# Patient Record
Sex: MALE | Race: ASIAN | NOT HISPANIC OR LATINO | Employment: OTHER | URBAN - METROPOLITAN AREA
[De-identification: names, ages, dates, MRNs, and addresses within clinical notes are randomized per-mention and may not be internally consistent; named-entity substitution may affect disease eponyms.]

---

## 2017-02-01 ENCOUNTER — OFFICE VISIT (OUTPATIENT)
Dept: INTERNAL MEDICINE | Facility: CLINIC | Age: 67
End: 2017-02-01
Payer: MEDICARE

## 2017-02-01 VITALS
BODY MASS INDEX: 28.77 KG/M2 | HEIGHT: 69 IN | HEART RATE: 66 BPM | DIASTOLIC BLOOD PRESSURE: 84 MMHG | SYSTOLIC BLOOD PRESSURE: 122 MMHG | WEIGHT: 194.25 LBS

## 2017-02-01 DIAGNOSIS — N13.8 BPH (BENIGN PROSTATIC HYPERTROPHY) WITH URINARY OBSTRUCTION: ICD-10-CM

## 2017-02-01 DIAGNOSIS — I10 ESSENTIAL HYPERTENSION: ICD-10-CM

## 2017-02-01 DIAGNOSIS — Z00.00 ANNUAL PHYSICAL EXAM: Primary | ICD-10-CM

## 2017-02-01 DIAGNOSIS — Z85.09 HISTORY OF CHOLANGIOCARCINOMA: ICD-10-CM

## 2017-02-01 DIAGNOSIS — R73.03 PREDIABETES: ICD-10-CM

## 2017-02-01 DIAGNOSIS — Z86.010 HX OF COLONIC POLYPS: ICD-10-CM

## 2017-02-01 DIAGNOSIS — N40.1 BPH (BENIGN PROSTATIC HYPERTROPHY) WITH URINARY OBSTRUCTION: ICD-10-CM

## 2017-02-01 DIAGNOSIS — E78.5 HYPERLIPIDEMIA, UNSPECIFIED HYPERLIPIDEMIA TYPE: ICD-10-CM

## 2017-02-01 LAB
BILIRUB UR QL STRIP: NEGATIVE
CLARITY UR REFRACT.AUTO: ABNORMAL
COLOR UR AUTO: YELLOW
CREAT UR-MCNC: 53 MG/DL
GLUCOSE UR QL STRIP: NEGATIVE
HGB UR QL STRIP: NEGATIVE
KETONES UR QL STRIP: NEGATIVE
LEUKOCYTE ESTERASE UR QL STRIP: NEGATIVE
MICROALBUMIN UR DL<=1MG/L-MCNC: 5 UG/ML
MICROALBUMIN/CREATININE RATIO: 9.4 UG/MG
NITRITE UR QL STRIP: NEGATIVE
PH UR STRIP: 8 [PH] (ref 5–8)
PROT UR QL STRIP: NEGATIVE
SP GR UR STRIP: 1.01 (ref 1–1.03)
URN SPEC COLLECT METH UR: ABNORMAL
UROBILINOGEN UR STRIP-ACNC: NEGATIVE EU/DL

## 2017-02-01 PROCEDURE — 99397 PER PM REEVAL EST PAT 65+ YR: CPT | Mod: S$GLB,,, | Performed by: INTERNAL MEDICINE

## 2017-02-01 PROCEDURE — 82570 ASSAY OF URINE CREATININE: CPT

## 2017-02-01 PROCEDURE — 81003 URINALYSIS AUTO W/O SCOPE: CPT

## 2017-02-01 PROCEDURE — 99999 PR PBB SHADOW E&M-EST. PATIENT-LVL III: CPT | Mod: PBBFAC,,, | Performed by: INTERNAL MEDICINE

## 2017-02-01 PROCEDURE — 82270 OCCULT BLOOD FECES: CPT | Mod: S$GLB,,, | Performed by: INTERNAL MEDICINE

## 2017-02-01 RX ORDER — OMEPRAZOLE 20 MG/1
20 CAPSULE, DELAYED RELEASE ORAL DAILY
Qty: 30 CAPSULE | Refills: 11 | Status: SHIPPED | OUTPATIENT
Start: 2017-02-01 | End: 2017-03-06 | Stop reason: SDUPTHER

## 2017-02-01 RX ORDER — TAMSULOSIN HYDROCHLORIDE 0.4 MG/1
0.4 CAPSULE ORAL DAILY
Qty: 30 CAPSULE | Refills: 11 | Status: SHIPPED | OUTPATIENT
Start: 2017-02-01 | End: 2017-03-06 | Stop reason: SDUPTHER

## 2017-02-01 NOTE — MR AVS SNAPSHOT
[facility]  [address]  [place] [address]  Phone: [phone]  Fax: [phone]                  Eric Brown   2017 11:00 AM   Office Visit    Description:  Male : 1950   Provider:  Pacheco Beyer MD   Department:  [facility]           Reason for Visit     Annual Exam           Diagnoses this Visit        Comments    Annual physical exam    -  Primary     Essential hypertension         BPH (benign prostatic hypertrophy) with urinary obstruction         Hyperlipidemia, unspecified hyperlipidemia type         Prediabetes         Hx of colonic polyps         History of cholangiocarcinoma                To Do List           Future Appointments        Provider Department Dept Phone    2017 3:30 PM Amalia Canales MD [place] - Hematology Oncology [phone]      Goals (5 Years of Data)     Reduce portion size       Follow-Up and Disposition     Return in about 4 weeks (around 3/1/2017).       These Medications        Disp Refills Start End    tamsulosin (FLOMAX) 0.4 mg Cp24 30 capsule 11 2017     Take 1 capsule (0.4 mg total) by mouth once daily. - Oral    Pharmacy: [facility]/pharmacy #[id number] - [place] - [address] Ph #: [phone]       omeprazole (PRILOSEC) 20 MG capsule 30 capsule 11 2017 3/3/2017    Take 1 capsule (20 mg total) by mouth once daily. - Oral    Pharmacy: [facility]/pharmacy #[id number] - [place] - [address] Ph #: [phone]         [facility]     [facility] On Call Nurse Care Line -  Assistance  Registered nurses in the [facility] On Call Center provide clinical advisement, health education, appointment booking, and other advisory services.  Call for this free service at [phone].             Medications           Message regarding Medications     Verify the changes and/or additions to your medication regime listed below are the same as discussed with your clinician today.  If any of these  changes or additions are incorrect, please notify your healthcare provider.        START taking these NEW medications        Refills    tamsulosin (FLOMAX) 0.4 mg Cp24 11    Sig: Take 1 capsule (0.4 mg total) by mouth once daily.    Class: Normal    Route: Oral    omeprazole (PRILOSEC) 20 MG capsule 11    Sig: Take 1 capsule (20 mg total) by mouth once daily.    Class: Normal    Route: Oral      STOP taking these medications     doxazosin (CARDURA) 4 MG tablet Take 1 tablet (4 mg total) by mouth every evening.           Verify that the below list of medications is an accurate representation of the medications you are currently taking.  If none reported, the list may be blank. If incorrect, please contact your healthcare provider. Carry this list with you in case of emergency.           Current Medications     allopurinol (ZYLOPRIM) 100 MG tablet Take 1 tablet (100 mg total) by mouth once daily.    aspirin 81 MG Chew Take 81 mg by mouth once daily.    atorvastatin (LIPITOR) 10 MG tablet Take 1 tablet (10 mg total) by mouth once daily.    CALCIUM CARBONATE/VITAMIN D3 (VITAMIN D-3 ORAL) Take 1 tablet by mouth once daily.    felodipine (PLENDIL) 10 MG 24 hr tablet Take 1 tablet (10 mg total) by mouth once daily.    losartan (COZAAR) 50 MG tablet Take 1 tablet (50 mg total) by mouth once daily.    meloxicam (MOBIC) 15 MG tablet Take 1 tablet (15 mg total) by mouth once daily.    MULTIVIT-IRON-MIN-FOLIC ACID 3,500-18-0.4 UNIT-MG-MG ORAL CHEW Take by mouth.    diclofenac sodium (VOLTAREN) 1 % Gel Apply 2 g topically 4 (four) times daily.    omeprazole (PRILOSEC) 20 MG capsule Take 1 capsule (20 mg total) by mouth once daily.    tamsulosin (FLOMAX) 0.4 mg Cp24 Take 1 capsule (0.4 mg total) by mouth once daily.           Clinical Reference Information           Vital Signs - Last Recorded  Most recent update: 2/1/2017 11:15 AM by Kristen Flores MA    BP Pulse Ht Wt BMI    122/84 (BP Location: Right arm, Patient Position:  "Sitting, BP Method: Manual) 66 5' 9" (1.753 m) 88.1 kg (194 lb 3.6 oz) 28.68 kg/m2      Blood Pressure          Most Recent Value    BP  122/84      Allergies as of 2/1/2017     Iodine And Iodide Containing Products    Iohexol      Immunizations Administered on Date of Encounter - 2/1/2017     None      Orders Placed During Today's Visit      Normal Orders This Visit    Microalbumin/creatinine urine ratio     POCT occult blood stool     Urinalysis     Future Labs/Procedures Expected by Expires    CBC auto differential  2/1/2017 2/1/2018    Hemoglobin A1c  2/1/2017 2/1/2018    Lipid panel  2/1/2017 2/1/2018    PSA, Screening  2/1/2017 2/1/2018    Comprehensive metabolic panel  8/4/2017 (Approximate) 2/1/2018      "

## 2017-02-01 NOTE — PROGRESS NOTES
CHIEF COMPLAINT:  Physical exam.    HISTORY OF PRESENT ILLNESS:  The patient is a 66-year-old gentleman with a   history of BPH, AFib, cholangiocarcinoma, colon polyps, hypertension,   hyperlipidemia who comes in today for annual physical exam.  The patient did go   to [facility] in November, things are going well.    REVIEW OF SYSTEMS:  The patient reports some decreased clarity of his vision.    No auditory change, no dysphasia, no chest pain, no shortness of breath, no   nausea, vomiting, no abdominal pain, no bowel changes.  He does bring up that he   has been having episodes of burning in the back of the throat.  His son who is   a physician here at [facility] recommended proton pump inhibitor, which he did take   for one week.  He is feeling much better.  He is inquiring whether he can have   a prescription.  He has no trouble with urination, but the flow is not quite as   strong as it used to be.  Sometimes he gets up once at night to urinate.  No   weakness in the arms or legs.  No skin changes.  No numbness or tingling in the   arms or legs.  He had a colonoscopy in 2015.  His last cholesterol was in   November 2015.    PHYSICAL EXAMINATION:  GENERAL APPEARANCE:  No acute distress.  HEENT:  Conjunctivae clear.  He does have bilateral cataracts.  TMs are clear.    Nasal septum is midline without discharge.  Oropharynx is without erythema.  NECK:  Trachea is midline without JVD, without thyromegaly.  PULMONARY:  Good inspiratory, expiratory breath sounds are heard.  Lungs are   clear to auscultation.  CARDIOVASCULAR:  S1, S2.  2+ carotid pulses without bruits.  EXTREMITIES:  Without edema.  ABDOMEN:  Nontender, nondistended, without hepatosplenomegaly.  RECTAL:  A digital rectal exam was performed.  The rectum was normal.  Stool was   brown and heme negative.  GENITOURINARY:  Scrotum and penis were normal.  Prostate without masses or   nodules.  LYMPHATICS:  No cervical, axillary or inguinal  adenopathy.    ASSESSMENT:  1.  Annual physical exam.  2.  Hypertension.  3.  BPH.  4.  Hyperlipidemia.  5.  Prediabetes.  6.  History of colon polyps.  7.  History of cholangiocarcinoma.    PLAN:  We will put the patient in for a CBC, CMP, A1c, lipid panel, urine for   microalbumin, stools x3, PSA and UA.  We will also start the patient on Flomax   0.4 mg once a day.  We will also stop the Cardura that he is on.  He is to   follow up in one month for reevaluation.      PREM/IN  dd: 02/01/2017 15:14:33 (CST)  td: 02/02/2017 06:07:28 (CST)  Doc ID   #[id number]  Job ID #[id number]    CC:

## 2017-02-02 ENCOUNTER — LAB VISIT (OUTPATIENT)
Dept: LAB | Facility: HOSPITAL | Age: 67
End: 2017-02-02
Attending: INTERNAL MEDICINE
Payer: MEDICARE

## 2017-02-02 DIAGNOSIS — Z85.09 HISTORY OF CHOLANGIOCARCINOMA: ICD-10-CM

## 2017-02-02 DIAGNOSIS — Z00.00 ANNUAL PHYSICAL EXAM: ICD-10-CM

## 2017-02-02 DIAGNOSIS — I10 ESSENTIAL HYPERTENSION: ICD-10-CM

## 2017-02-02 DIAGNOSIS — R73.03 PREDIABETES: ICD-10-CM

## 2017-02-02 LAB
ALBUMIN SERPL BCP-MCNC: 3.9 G/DL
ALP SERPL-CCNC: 52 U/L
ALT SERPL W/O P-5'-P-CCNC: 25 U/L
ANION GAP SERPL CALC-SCNC: 7 MMOL/L
AST SERPL-CCNC: 26 U/L
BASOPHILS # BLD AUTO: 0.02 K/UL
BASOPHILS NFR BLD: 0.3 %
BILIRUB SERPL-MCNC: 0.8 MG/DL
BUN SERPL-MCNC: 21 MG/DL
CALCIUM SERPL-MCNC: 9.2 MG/DL
CHLORIDE SERPL-SCNC: 106 MMOL/L
CHOLEST/HDLC SERPL: 3.3 {RATIO}
CO2 SERPL-SCNC: 27 MMOL/L
COMPLEXED PSA SERPL-MCNC: 2.1 NG/ML
CREAT SERPL-MCNC: 1.3 MG/DL
DIFFERENTIAL METHOD: ABNORMAL
EOSINOPHIL # BLD AUTO: 0.1 K/UL
EOSINOPHIL NFR BLD: 2.3 %
ERYTHROCYTE [DISTWIDTH] IN BLOOD BY AUTOMATED COUNT: 13.5 %
EST. GFR  (AFRICAN AMERICAN): >60 ML/MIN/1.73 M^2
EST. GFR  (NON AFRICAN AMERICAN): 56.5 ML/MIN/1.73 M^2
GLUCOSE SERPL-MCNC: 118 MG/DL
HCT VFR BLD AUTO: 42.1 %
HDL/CHOLESTEROL RATIO: 30.7 %
HDLC SERPL-MCNC: 163 MG/DL
HDLC SERPL-MCNC: 50 MG/DL
HGB BLD-MCNC: 14.1 G/DL
LDLC SERPL CALC-MCNC: 93.6 MG/DL
LYMPHOCYTES # BLD AUTO: 1.5 K/UL
LYMPHOCYTES NFR BLD: 24.8 %
MCH RBC QN AUTO: 31 PG
MCHC RBC AUTO-ENTMCNC: 33.5 %
MCV RBC AUTO: 93 FL
MONOCYTES # BLD AUTO: 0.5 K/UL
MONOCYTES NFR BLD: 8.6 %
NEUTROPHILS # BLD AUTO: 3.9 K/UL
NEUTROPHILS NFR BLD: 63.8 %
NONHDLC SERPL-MCNC: 113 MG/DL
PLATELET # BLD AUTO: 206 K/UL
PMV BLD AUTO: 9.9 FL
POTASSIUM SERPL-SCNC: 5 MMOL/L
PROT SERPL-MCNC: 7.3 G/DL
RBC # BLD AUTO: 4.55 M/UL
SODIUM SERPL-SCNC: 140 MMOL/L
TRIGL SERPL-MCNC: 97 MG/DL
WBC # BLD AUTO: 6.06 K/UL

## 2017-02-02 PROCEDURE — 36415 COLL VENOUS BLD VENIPUNCTURE: CPT

## 2017-02-02 PROCEDURE — 83036 HEMOGLOBIN GLYCOSYLATED A1C: CPT

## 2017-02-02 PROCEDURE — 84153 ASSAY OF PSA TOTAL: CPT

## 2017-02-02 PROCEDURE — 80053 COMPREHEN METABOLIC PANEL: CPT

## 2017-02-02 PROCEDURE — 85025 COMPLETE CBC W/AUTO DIFF WBC: CPT

## 2017-02-02 PROCEDURE — 80061 LIPID PANEL: CPT

## 2017-02-03 LAB
ESTIMATED AVG GLUCOSE: 123 MG/DL
HBA1C MFR BLD HPLC: 5.9 %

## 2017-02-14 RX ORDER — DOXAZOSIN 4 MG/1
4 TABLET ORAL NIGHTLY
Qty: 90 TABLET | Refills: 3 | OUTPATIENT
Start: 2017-02-14 | End: 2018-02-14

## 2017-02-15 NOTE — TELEPHONE ENCOUNTER
----- Message from Pacheco Beyer MD sent at 2/14/2017  2:28 PM CST -----  Please contact patient. Notify that we received a request for Doxazosin. This was discontinued and we started him on Flomax.

## 2017-02-16 ENCOUNTER — TELEPHONE (OUTPATIENT)
Dept: INTERNAL MEDICINE | Facility: CLINIC | Age: 67
End: 2017-02-16

## 2017-02-16 NOTE — TELEPHONE ENCOUNTER
----- Message from Pachceo Beyer MD sent at 2/14/2017  2:28 PM CST -----  Please contact patient. Notify that we received a request for Doxazosin. This was discontinued and we started him on Flomax.

## 2017-02-24 ENCOUNTER — OFFICE VISIT (OUTPATIENT)
Dept: HEMATOLOGY/ONCOLOGY | Facility: CLINIC | Age: 67
End: 2017-02-24
Payer: MEDICARE

## 2017-02-24 VITALS
OXYGEN SATURATION: 98 % | HEART RATE: 60 BPM | TEMPERATURE: 98 F | SYSTOLIC BLOOD PRESSURE: 140 MMHG | BODY MASS INDEX: 28.5 KG/M2 | WEIGHT: 192.44 LBS | HEIGHT: 69 IN | RESPIRATION RATE: 18 BRPM | DIASTOLIC BLOOD PRESSURE: 82 MMHG

## 2017-02-24 DIAGNOSIS — C22.1 INTRAHEPATIC CHOLANGIOCARCINOMA: Primary | ICD-10-CM

## 2017-02-24 LAB
CTP QC/QA: YES
FECAL OCCULT BLOOD, POC: NEGATIVE

## 2017-02-24 PROCEDURE — 99213 OFFICE O/P EST LOW 20 MIN: CPT | Mod: S$GLB,,, | Performed by: INTERNAL MEDICINE

## 2017-02-24 PROCEDURE — 99999 PR PBB SHADOW E&M-EST. PATIENT-LVL III: CPT | Mod: PBBFAC,,, | Performed by: INTERNAL MEDICINE

## 2017-02-24 NOTE — PROGRESS NOTES
"Subjective:       Patient ID: Eric Brown is a 66 y.o. male.    Chief Complaint: cholangiocarcinoma  ONCOLOGIC HISTORY:  Mr. Brown is a 66 year old male who in 6/2012 underwent a distal pancreatectomy for a PNET and a liver resection for a T2N0M0 intrahepatic cholangiocarcinoma.  Unfortunately had a recurrence of the cholangiocarcinoma and underwent a repeat resection at [facility].  Completed adjuvant treatment with cisplatin and gemcitabine - 6 cycles.      HPIHe comes in today to review his restaging CT scans from [facility] on 2/21/17 which reveal "New noncalcified pulmonary nodules within the lower lobes and middle lobe that are indeterminate but concerning for metastases"  MRI abdomen reveals No definite evidence of metastasis within the abdomen or pelvis. Numerous early arterial enhancing foci within the liver are unchanged and may again represent transient perfusion changes. Left posterior bladder 1.5 x 1.0 cm lesion is indeterminate but is suspicious for mass lesion, and has been present since 8/10/2012. Direct inspection with cystoscopy is recommended. Unchanged pancreatic tail complex pseudocyst/seroma and small pancreatic body intraductal papillary mucinous neoplasm.  He saw oncologist at [facility] and was recommended repeat CT scan in 3 months.  The current CT chest from [facility] was compared to CT chest from [facility] in 6/12 (by [facility]) and the lung nodules were not noted at that time.    Review of Systems   Constitutional: Negative for appetite change, fatigue and unexpected weight change.   HENT: Negative for mouth sores.    Eyes: Negative for visual disturbance.   Respiratory: Negative for cough and shortness of breath.    Cardiovascular: Negative for chest pain.   Gastrointestinal: Negative for abdominal pain and diarrhea.   Genitourinary: Negative for frequency.   Musculoskeletal: Negative for back pain.   Skin: Negative for rash.   Neurological: Negative for headaches. "   Hematological: Negative for adenopathy.   Psychiatric/Behavioral: The patient is not nervous/anxious.    All other systems reviewed and are negative.      PMFSH: all information reviewed and updated as relevant to today's visit  Objective:      Physical Exam   Constitutional: He is oriented to person, place, and time. He appears well-developed and well-nourished.   HENT:   Mouth/Throat: No oropharyngeal exudate.   Cardiovascular: Normal rate and normal heart sounds.    Pulmonary/Chest: Effort normal and breath sounds normal. He has no wheezes.   Abdominal: Soft. Bowel sounds are normal. There is no tenderness.   Musculoskeletal: He exhibits no edema or tenderness.   Lymphadenopathy:     He has no cervical adenopathy.   Neurological: He is alert and oriented to person, place, and time. Coordination normal.   Skin: Skin is warm and dry. No rash noted.   Psychiatric: He has a normal mood and affect. Judgment and thought content normal.   Vitals reviewed.      LABS:  Reviewed from [facility]   Assessment:       1. Intrahepatic cholangiocarcinoma        Plan:        1. Reviewed CT reports from [facility]. The recommendation of repeat CT scans in 3 months including chest seems appropriate. However the CD's will be uploaded on to [employer] and I will review them once available.      Above care plan was discussed with patient and accompanying wife and sone and all questions were addressed to their satisfaction

## 2017-03-06 ENCOUNTER — OFFICE VISIT (OUTPATIENT)
Dept: OPTOMETRY | Facility: CLINIC | Age: 67
End: 2017-03-06
Payer: MEDICARE

## 2017-03-06 ENCOUNTER — OFFICE VISIT (OUTPATIENT)
Dept: INTERNAL MEDICINE | Facility: CLINIC | Age: 67
End: 2017-03-06
Payer: MEDICARE

## 2017-03-06 VITALS
BODY MASS INDEX: 28.11 KG/M2 | SYSTOLIC BLOOD PRESSURE: 124 MMHG | HEIGHT: 69 IN | HEART RATE: 78 BPM | WEIGHT: 189.81 LBS | DIASTOLIC BLOOD PRESSURE: 76 MMHG

## 2017-03-06 DIAGNOSIS — H25.13 NUCLEAR SCLEROSIS, BILATERAL: Primary | ICD-10-CM

## 2017-03-06 DIAGNOSIS — H40.013 OPEN ANGLE WITH BORDERLINE FINDINGS AND LOW GLAUCOMA RISK IN BOTH EYES: ICD-10-CM

## 2017-03-06 DIAGNOSIS — H52.13 MYOPIA WITH PRESBYOPIA, BILATERAL: ICD-10-CM

## 2017-03-06 DIAGNOSIS — I10 ESSENTIAL HYPERTENSION: Primary | ICD-10-CM

## 2017-03-06 DIAGNOSIS — H52.4 MYOPIA WITH PRESBYOPIA, BILATERAL: ICD-10-CM

## 2017-03-06 DIAGNOSIS — N40.1 BPH (BENIGN PROSTATIC HYPERTROPHY) WITH URINARY OBSTRUCTION: ICD-10-CM

## 2017-03-06 DIAGNOSIS — N13.8 BPH (BENIGN PROSTATIC HYPERTROPHY) WITH URINARY OBSTRUCTION: ICD-10-CM

## 2017-03-06 PROCEDURE — 92015 DETERMINE REFRACTIVE STATE: CPT | Mod: S$GLB,,, | Performed by: OPTOMETRIST

## 2017-03-06 PROCEDURE — 99999 PR PBB SHADOW E&M-EST. PATIENT-LVL III: CPT | Mod: PBBFAC,,, | Performed by: INTERNAL MEDICINE

## 2017-03-06 PROCEDURE — 92014 COMPRE OPH EXAM EST PT 1/>: CPT | Mod: S$GLB,,, | Performed by: OPTOMETRIST

## 2017-03-06 PROCEDURE — 99214 OFFICE O/P EST MOD 30 MIN: CPT | Mod: S$GLB,,, | Performed by: INTERNAL MEDICINE

## 2017-03-06 PROCEDURE — 99999 PR PBB SHADOW E&M-EST. PATIENT-LVL III: CPT | Mod: PBBFAC,,, | Performed by: OPTOMETRIST

## 2017-03-06 RX ORDER — OMEPRAZOLE 20 MG/1
20 CAPSULE, DELAYED RELEASE ORAL DAILY
Qty: 90 CAPSULE | Refills: 3 | Status: SHIPPED | OUTPATIENT
Start: 2017-03-06 | End: 2018-02-28 | Stop reason: SDUPTHER

## 2017-03-06 RX ORDER — TAMSULOSIN HYDROCHLORIDE 0.4 MG/1
0.4 CAPSULE ORAL DAILY
Qty: 90 CAPSULE | Refills: 3 | Status: SHIPPED | OUTPATIENT
Start: 2017-03-06 | End: 2018-02-28 | Stop reason: SDUPTHER

## 2017-03-06 NOTE — MR AVS SNAPSHOT
[facility]  [address]  [place] [address]  Phone: [phone]  Fax: [phone]                  Eric Brown   3/6/2017 9:00 AM   Office Visit    Description:  Male : 1950   Provider:  Pacheco Beyer MD   Department:  [facility]           Reason for Visit     Follow-up     Medication Refill           Diagnoses this Visit        Comments    Essential hypertension    -  Primary     BPH (benign prostatic hypertrophy) with urinary obstruction                To Do List           Future Appointments        Provider Department Dept Phone    3/6/2017 2:30 PM Paige Turpin, OD [facility] [phone]      Goals (5 Years of Data)     Reduce portion size        These Medications        Disp Refills Start End    tamsulosin (FLOMAX) 0.4 mg Cp24 90 capsule 3 3/6/2017     Take 1 capsule (0.4 mg total) by mouth once daily. - Oral    Pharmacy: [facility]/pharmacy #[id number] - [place] - [address] Ph #: [phone]       omeprazole (PRILOSEC) 20 MG capsule 90 capsule 3 3/6/2017 2017    Take 1 capsule (20 mg total) by mouth once daily. - Oral    Pharmacy: [facility]/pharmacy #[id number] - [place] - [address] Ph #: [phone]         [facility] On Call     [facility] On Call Nurse Care Line -  Assistance  Registered nurses in the [facility] On Call Center provide clinical advisement, health education, appointment booking, and other advisory services.  Call for this free service at [phone].             Medications           Message regarding Medications     Verify the changes and/or additions to your medication regime listed below are the same as discussed with your clinician today.  If any of these changes or additions are incorrect, please notify your healthcare provider.        STOP taking these medications     meloxicam (MOBIC) 15 MG tablet Take 1 tablet (15 mg total) by mouth once daily.           Verify that the below list of  "medications is an accurate representation of the medications you are currently taking.  If none reported, the list may be blank. If incorrect, please contact your healthcare provider. Carry this list with you in case of emergency.           Current Medications     allopurinol (ZYLOPRIM) 100 MG tablet Take 1 tablet (100 mg total) by mouth once daily.    aspirin 81 MG Chew Take 81 mg by mouth once daily.    atorvastatin (LIPITOR) 10 MG tablet Take 1 tablet (10 mg total) by mouth once daily.    felodipine (PLENDIL) 10 MG 24 hr tablet Take 1 tablet (10 mg total) by mouth once daily.    losartan (COZAAR) 50 MG tablet Take 1 tablet (50 mg total) by mouth once daily.    MULTIVIT-IRON-MIN-FOLIC ACID 3,500-18-0.4 UNIT-MG-MG ORAL CHEW Take by mouth.    tamsulosin (FLOMAX) 0.4 mg Cp24 Take 1 capsule (0.4 mg total) by mouth once daily.    diclofenac sodium (VOLTAREN) 1 % Gel Apply 2 g topically 4 (four) times daily.    omeprazole (PRILOSEC) 20 MG capsule Take 1 capsule (20 mg total) by mouth once daily.           Clinical Reference Information           Your Vitals Were     BP Pulse Height Weight BMI    124/76 (BP Location: Right arm, Patient Position: Sitting, BP Method: Manual) 78 5' 9" (1.753 m) 86.1 kg (189 lb 13.1 oz) 28.03 kg/m2      Blood Pressure          Most Recent Value    BP  124/76      Allergies as of 3/6/2017     Iodine And Iodide Containing Products    Iohexol      Immunizations Administered on Date of Encounter - 3/6/2017     None      Language Assistance Services     ATTENTION: Language assistance services are available, free of charge. Please call [phone].      ATENCIÓN: Si habla español, tiene a treviño disposición servicios gratuitos de asistencia lingüística. Llame al [phone].     RAFAEL Ý: N?u b?n nói Ti?ng Vi?t, có các d?ch v? h? tr? ngôn ng? mi?n phí dành cho b?n. G?i s? [phone].         [facility] complies with applicable Federal civil rights laws and does not " discriminate on the basis of race, color, national origin, age, disability, or sex.

## 2017-03-06 NOTE — PROGRESS NOTES
CHIEF COMPLAINT:  Followup of BPH and blood pressure.    HISTORY OF PRESENT ILLNESS:  The patient is a 66-year-old gentleman with a   history of hypertension and BPH.  On last visit, he had his medications changed.    He stopped his doxazosin and start him on Flomax.  He reports no problems with   his blood pressure.  He has not noticed any change.  In regards to his   prostate, he still gets up once at night to urinate.  He has a little trouble   with starting, but no trouble stopping.    REVIEW OF SYSTEMS:  The patient reports he recently went to [facility] two   weeks ago.  A CT scan was done on the chest, which showed four lung nodules, one   was 7 mm, two that was 6 mm and one was 5 mm.  Followup CT is scheduled for   three months from now.  The patient does have a history of reflux and placed on   a PPI.  He reports no more burning at the back of the throat.  He like this   medication refilled.    PHYSICAL EXAMINATION:  GENERAL APPEARANCE:  No acute distress.  VITAL SIGNS:  Repeat blood pressure taken by myself was 124/76 in the right arm.  PULMONARY:  Good inspiratory, expiratory breath sounds are heard.  Lungs are   clear to auscultation.  CARDIOVASCULAR:  S1, S2.  EXTREMITIES:  With trace edema.  ABDOMEN:  Nontender, nondistended, without hepatosplenomegaly.    ASSESSMENT:  1.  Benign prostatic hypertrophy.  2.  Hypertension.  3.  Lung nodules.  4.  Reflux, currently stable.    PLAN:  We will refill the patient's omeprazole 20 mg. We will go ahead and   continue with Flomax at current dose.  The patient was offered an appointment to   see Urology.  The patient declines at this time.      PREM/RYNE  dd: 03/06/2017 10:08:25 (CST)  td: 03/07/2017 07:37:48 (CST)  Doc ID   #[id number]  Job ID #[id number]    CC:

## 2017-03-07 NOTE — PROGRESS NOTES
HPI     Pt states: blurred vision for distance and reading. C/o light   sensitivity, has to wear sunglasses all the time. Floaters ou  PATIENT DENIES FLASHES, PAIN OR DIPLOPIA     PATIENT'S LAST DFE WAS 12/10/2015       Last edited by Radha Pat, PCT on 3/6/2017  2:52 PM.     ROS     Positive for: Eyes    Negative for: Constitutional, Gastrointestinal, Neurological, Skin,   Genitourinary, Musculoskeletal, HENT, Endocrine, Cardiovascular,   Respiratory, Psychiatric, Allergic/Imm, Heme/Lymph    Last edited by Paige Turpin, OD on 3/7/2017 12:27 PM. (History)        Assessment /Plan     For exam results, see Encounter Report.    Nuclear sclerosis, bilateral    Open angle with borderline findings and low glaucoma risk in both eyes    Myopia with presbyopia, bilateral            1.  Educated on cataracts and affects on vision.  Patient ok with vision for now.  ERM causing slight decrease in vision OU.  Monitor.  2.  Due to increased c/d ratio but previous testing normal OU.  Eye pressure stable OU and no family history of glaucoma.  Continue to monitor yearly.  3.  Bifocal rx given        RTC 1 year for routine exam.

## 2017-05-08 RX ORDER — ATORVASTATIN CALCIUM 10 MG/1
10 TABLET, FILM COATED ORAL DAILY
Qty: 90 TABLET | Refills: 3 | Status: SHIPPED | OUTPATIENT
Start: 2017-05-08 | End: 2018-05-02 | Stop reason: SDUPTHER

## 2017-06-05 ENCOUNTER — OFFICE VISIT (OUTPATIENT)
Dept: HEMATOLOGY/ONCOLOGY | Facility: CLINIC | Age: 67
End: 2017-06-05
Payer: MEDICARE

## 2017-06-05 ENCOUNTER — LAB VISIT (OUTPATIENT)
Dept: LAB | Facility: HOSPITAL | Age: 67
End: 2017-06-05
Attending: INTERNAL MEDICINE
Payer: MEDICARE

## 2017-06-05 ENCOUNTER — PATIENT MESSAGE (OUTPATIENT)
Dept: HEMATOLOGY/ONCOLOGY | Facility: CLINIC | Age: 67
End: 2017-06-05

## 2017-06-05 VITALS
BODY MASS INDEX: 27.58 KG/M2 | OXYGEN SATURATION: 97 % | DIASTOLIC BLOOD PRESSURE: 85 MMHG | HEART RATE: 84 BPM | RESPIRATION RATE: 16 BRPM | SYSTOLIC BLOOD PRESSURE: 139 MMHG | WEIGHT: 186.75 LBS | TEMPERATURE: 98 F

## 2017-06-05 DIAGNOSIS — C22.1 INTRAHEPATIC CHOLANGIOCARCINOMA: Primary | ICD-10-CM

## 2017-06-05 DIAGNOSIS — C22.1 INTRAHEPATIC CHOLANGIOCARCINOMA: ICD-10-CM

## 2017-06-05 DIAGNOSIS — R91.8 LUNG NODULES: ICD-10-CM

## 2017-06-05 LAB
ERYTHROCYTE [DISTWIDTH] IN BLOOD BY AUTOMATED COUNT: 14.9 %
FERRITIN SERPL-MCNC: 136 NG/ML
FOLATE SERPL-MCNC: 17.7 NG/ML
HCT VFR BLD AUTO: 42.2 %
HGB BLD-MCNC: 14.4 G/DL
IRON SERPL-MCNC: 53 UG/DL
MCH RBC QN AUTO: 31.2 PG
MCHC RBC AUTO-ENTMCNC: 34.1 %
MCV RBC AUTO: 92 FL
NEUTROPHILS # BLD AUTO: 3.9 K/UL
PLATELET # BLD AUTO: 185 K/UL
PMV BLD AUTO: 9.6 FL
RBC # BLD AUTO: 4.61 M/UL
SATURATED IRON: 15 %
TOTAL IRON BINDING CAPACITY: 358 UG/DL
TRANSFERRIN SERPL-MCNC: 242 MG/DL
VIT B12 SERPL-MCNC: 1336 PG/ML
WBC # BLD AUTO: 6.09 K/UL

## 2017-06-05 PROCEDURE — 82607 VITAMIN B-12: CPT

## 2017-06-05 PROCEDURE — 99214 OFFICE O/P EST MOD 30 MIN: CPT | Mod: S$GLB,,, | Performed by: INTERNAL MEDICINE

## 2017-06-05 PROCEDURE — 83540 ASSAY OF IRON: CPT

## 2017-06-05 PROCEDURE — 82728 ASSAY OF FERRITIN: CPT

## 2017-06-05 PROCEDURE — 99999 PR PBB SHADOW E&M-EST. PATIENT-LVL IV: CPT | Mod: PBBFAC,,, | Performed by: INTERNAL MEDICINE

## 2017-06-05 PROCEDURE — 36415 COLL VENOUS BLD VENIPUNCTURE: CPT

## 2017-06-05 PROCEDURE — 82746 ASSAY OF FOLIC ACID SERUM: CPT

## 2017-06-05 PROCEDURE — 85027 COMPLETE CBC AUTOMATED: CPT

## 2017-06-05 RX ORDER — DIPHENHYDRAMINE HCL 25 MG
CAPSULE ORAL
Refills: 0 | COMMUNITY
Start: 2017-05-26 | End: 2018-01-29

## 2017-06-05 RX ORDER — OMEPRAZOLE 20 MG/1
CAPSULE, DELAYED RELEASE ORAL
COMMUNITY
Start: 2017-05-31 | End: 2018-08-13 | Stop reason: SDUPTHER

## 2017-06-05 RX ORDER — METRONIDAZOLE 500 MG/1
TABLET ORAL
Refills: 0 | COMMUNITY
Start: 2017-03-14 | End: 2018-01-29

## 2017-06-05 NOTE — PROGRESS NOTES
"Subjective:       Patient ID: Eric Brown is a 66 y.o. male.    Chief Complaint: Intrahepatic cholangiocarcinoma  ONCOLOGIC HISTORY:  Mr. Brown is a 66 year old male who in 6/2012 underwent a distal pancreatectomy for a PNET and a liver resection for a T2N0M0 intrahepatic cholangiocarcinoma.  Unfortunately had a recurrence of the cholangiocarcinoma and underwent a repeat resection at [facility].  Completed adjuvant treatment with cisplatin and gemcitabine - 6 cycles.    HPIHe has returned from [facility] and his CT scans reveal "Multiple bilateral pulmonary nodules have increased in size, most consistent with worsening pulmonary metastases"  MRI abdomen is stable  He was advised surveillance as the lesions are still less than 1 cm. He feels well and denies any new issues.    Review of Systems   Constitutional: Negative for appetite change, fatigue and unexpected weight change.   HENT: Negative for mouth sores.    Eyes: Negative for visual disturbance.   Respiratory: Negative for cough and shortness of breath.    Cardiovascular: Negative for chest pain.   Gastrointestinal: Negative for abdominal pain and diarrhea.   Genitourinary: Negative for frequency.   Musculoskeletal: Negative for back pain.   Skin: Negative for rash.   Neurological: Negative for headaches.   Hematological: Negative for adenopathy.   Psychiatric/Behavioral: The patient is not nervous/anxious.    All other systems reviewed and are negative.        Objective:      Physical Exam   Constitutional: He is oriented to person, place, and time. He appears well-developed and well-nourished.   HENT:   Mouth/Throat: No oropharyngeal exudate.   Cardiovascular: Normal rate and normal heart sounds.    Pulmonary/Chest: Effort normal and breath sounds normal. He has no wheezes.   Abdominal: Soft. Bowel sounds are normal. There is no tenderness.   Musculoskeletal: He exhibits no edema or tenderness.   Lymphadenopathy:     He has no cervical adenopathy. "   Neurological: He is alert and oriented to person, place, and time. Coordination normal.   Skin: Skin is warm and dry. No rash noted.   Psychiatric: He has a normal mood and affect. Judgment and thought content normal.   Vitals reviewed.      Labs reviewed from [facility]    Assessment:       1. Intrahepatic cholangiocarcinoma    2. Lung nodules        Plan:        1,2. Reviewed CT scans from [facility]. They reveal increase in size of lung lesions. Maximum size is 9 mm. He was given option of surveillance with CT scans in 3 months. I recommended repeat CT chest in 8 weeks. If increase in size of lung nodules will plan on biopsy here or at [facility] for IDH mutation trials. He understands that if the lung lesions are related to cholangiocarcinoma then he would be a stage IV and treatment will be palliative and not curative,    Above care plan was discussed with patient and accompanying wife and sons and all questions were addressed to their satisfaction

## 2017-06-14 ENCOUNTER — TELEPHONE (OUTPATIENT)
Dept: HEMATOLOGY/ONCOLOGY | Facility: CLINIC | Age: 67
End: 2017-06-14

## 2017-06-14 NOTE — TELEPHONE ENCOUNTER
"----- Message from Zach Rankin RN sent at 6/14/2017 10:38 AM CDT -----      ----- Message -----  From: Amalia Canales MD  Sent: 6/14/2017  10:32 AM  To: Zach Rankin RN    See if he can come ion Thursday   ----- Message -----  From: Zach Rankin RN  Sent: 6/14/2017  10:10 AM  To: Amalia Canales MD    Please talk to me about this patient-- this is the only patient i have to "reschedule" on the 19th of July.  ~zach"

## 2017-06-14 NOTE — TELEPHONE ENCOUNTER
Called and spoke with patient about his apt's on 7/19 needing to be rescheduled. Asked pt if he could come the next day. He stated that he will be going to [place] on 7/20 and asked to come in on 7/18 instead. Apt's rescheduled to 7/18.

## 2017-07-18 ENCOUNTER — OFFICE VISIT (OUTPATIENT)
Dept: HEMATOLOGY/ONCOLOGY | Facility: CLINIC | Age: 67
End: 2017-07-18
Payer: MEDICARE

## 2017-07-18 ENCOUNTER — HOSPITAL ENCOUNTER (OUTPATIENT)
Dept: RADIOLOGY | Facility: HOSPITAL | Age: 67
Discharge: HOME | End: 2017-07-18
Attending: INTERNAL MEDICINE
Payer: MEDICARE

## 2017-07-18 VITALS
HEART RATE: 83 BPM | TEMPERATURE: 98 F | SYSTOLIC BLOOD PRESSURE: 123 MMHG | OXYGEN SATURATION: 99 % | DIASTOLIC BLOOD PRESSURE: 79 MMHG | RESPIRATION RATE: 17 BRPM

## 2017-07-18 DIAGNOSIS — R91.8 LUNG NODULES: ICD-10-CM

## 2017-07-18 DIAGNOSIS — C22.1 CHOLANGIOCARCINOMA: Primary | ICD-10-CM

## 2017-07-18 PROCEDURE — 71250 CT THORAX DX C-: CPT | Mod: 26,,, | Performed by: RADIOLOGY

## 2017-07-18 PROCEDURE — 99214 OFFICE O/P EST MOD 30 MIN: CPT | Mod: S$GLB,,, | Performed by: INTERNAL MEDICINE

## 2017-07-18 PROCEDURE — 99999 PR PBB SHADOW E&M-EST. PATIENT-LVL III: CPT | Mod: PBBFAC,,, | Performed by: INTERNAL MEDICINE

## 2017-07-18 PROCEDURE — 1159F MED LIST DOCD IN RCRD: CPT | Mod: S$GLB,,, | Performed by: INTERNAL MEDICINE

## 2017-07-18 PROCEDURE — 1126F AMNT PAIN NOTED NONE PRSNT: CPT | Mod: S$GLB,,, | Performed by: INTERNAL MEDICINE

## 2017-07-18 NOTE — PROGRESS NOTES
"Subjective:       Patient ID: Eric Brown is a 66 y.o. male.    Chief Complaint: Intrahepatic cholangiocarcinoma  ONCOLOGIC HISTORY:  Mr. Brown is a 66 year old male who in 6/2012 underwent a distal pancreatectomy for a PNET and a liver resection for a T2N0M0 intrahepatic cholangiocarcinoma.  Unfortunately had a recurrence of the cholangiocarcinoma and underwent a repeat resection at [facility].  Completed adjuvant treatment with cisplatin and gemcitabine - 6 cycles.     He underwent CT at [facility] on 5/24/17 which revealed "Multiple bilateral pulmonary nodules have increased in size, most consistent with worsening pulmonary metastases"    HPIHe comes in to review his recent CT chest which reveals "Multiple lung nodules noted bilaterally with interval increase in size from prior CT chest on 5/24/2017.  This is consistent with pulmonary metastases in patient with history of intrahepatic cholangiocarcinoma. Pulmonary nodules described in detail above" He continues to feel well and denies any new complaints,  He is accompanied by his wife and sons.    Review of Systems   Constitutional: Negative for appetite change, fatigue and unexpected weight change.   HENT: Negative for mouth sores.    Eyes: Negative for visual disturbance.   Respiratory: Negative for cough and shortness of breath.    Cardiovascular: Negative for chest pain.   Gastrointestinal: Negative for abdominal pain and diarrhea.   Genitourinary: Negative for frequency.   Musculoskeletal: Negative for back pain.   Skin: Negative for rash.   Neurological: Negative for headaches.   Hematological: Negative for adenopathy.   Psychiatric/Behavioral: The patient is not nervous/anxious.    All other systems reviewed and are negative.      PMFSH: all information reviewed and updated as relevant to today's visit  Objective:      Physical Exam   Constitutional: He is oriented to person, place, and time. He appears well-developed and well-nourished.   HENT: "   Mouth/Throat: No oropharyngeal exudate.   Cardiovascular: Normal rate and normal heart sounds.    Pulmonary/Chest: Effort normal and breath sounds normal. He has no wheezes.   Abdominal: Soft. Bowel sounds are normal. There is no tenderness.   Musculoskeletal: He exhibits no edema or tenderness.   Lymphadenopathy:     He has no cervical adenopathy.   Neurological: He is alert and oriented to person, place, and time. Coordination normal.   Skin: Skin is warm and dry. No rash noted.   Psychiatric: He has a normal mood and affect. Judgment and thought content normal.   Vitals reviewed.      LABS:  WBC   Date Value Ref Range Status   06/05/2017 6.09 3.90 - 12.70 K/uL Final     Hemoglobin   Date Value Ref Range Status   06/05/2017 14.4 14.0 - 18.0 g/dL Final     Hematocrit   Date Value Ref Range Status   06/05/2017 42.2 40.0 - 54.0 % Final     Platelets   Date Value Ref Range Status   06/05/2017 185 150 - 350 K/uL Final     Gran #   Date Value Ref Range Status   06/05/2017 3.9 1.8 - 7.7 K/uL Final     Comment:     The ANC is based on a white cell differential from an   automated cell counter. It has not been microscopically   reviewed for the presence of abnormal cells. Clinical   correlation is required.         Chemistry        Component Value Date/Time     02/02/2017 0815    K 5.0 02/02/2017 0815     02/02/2017 0815    CO2 27 02/02/2017 0815    BUN 21 02/02/2017 0815    CREATININE 1.3 02/02/2017 0815     (H) 02/02/2017 0815        Component Value Date/Time    CALCIUM 9.2 02/02/2017 0815    ALKPHOS 52 (L) 02/02/2017 0815    AST 26 02/02/2017 0815    ALT 25 02/02/2017 0815    BILITOT 0.8 02/02/2017 0815    ESTGFRAFRICA >60.0 02/02/2017 0815    EGFRNONAA 56.5 (A) 02/02/2017 0815          Assessment:       1. Cholangiocarcinoma        Plan:        . Reviewed CT chest. The lesions appear bigger than the CT scans from May in [facility]. Radiology here di compare both scans. He is planning to call [facility] so he can be seen sooner to be evaluated for trials. He will keep me posted on this

## 2017-11-06 RX ORDER — LOSARTAN POTASSIUM 50 MG/1
50 TABLET ORAL DAILY
Qty: 90 TABLET | Refills: 3 | Status: SHIPPED | OUTPATIENT
Start: 2017-11-06 | End: 2018-08-05 | Stop reason: SDUPTHER

## 2017-11-06 RX ORDER — ALLOPURINOL 100 MG/1
100 TABLET ORAL DAILY
Qty: 90 TABLET | Refills: 3 | Status: SHIPPED | OUTPATIENT
Start: 2017-11-06 | End: 2018-10-25 | Stop reason: SDUPTHER

## 2017-11-06 RX ORDER — FELODIPINE 10 MG/1
10 TABLET, EXTENDED RELEASE ORAL DAILY
Qty: 90 TABLET | Refills: 3 | Status: SHIPPED | OUTPATIENT
Start: 2017-11-06 | End: 2018-08-05 | Stop reason: SDUPTHER

## 2018-01-22 ENCOUNTER — PATIENT MESSAGE (OUTPATIENT)
Dept: SPORTS MEDICINE | Facility: CLINIC | Age: 68
End: 2018-01-22

## 2018-01-29 ENCOUNTER — LAB VISIT (OUTPATIENT)
Dept: LAB | Facility: HOSPITAL | Age: 68
End: 2018-01-29
Attending: INTERNAL MEDICINE
Payer: MEDICARE

## 2018-01-29 ENCOUNTER — OFFICE VISIT (OUTPATIENT)
Dept: HEMATOLOGY/ONCOLOGY | Facility: CLINIC | Age: 68
End: 2018-01-29
Payer: MEDICARE

## 2018-01-29 ENCOUNTER — TELEPHONE (OUTPATIENT)
Dept: HEMATOLOGY/ONCOLOGY | Facility: CLINIC | Age: 68
End: 2018-01-29

## 2018-01-29 VITALS
HEART RATE: 74 BPM | DIASTOLIC BLOOD PRESSURE: 90 MMHG | HEIGHT: 69 IN | WEIGHT: 187.81 LBS | RESPIRATION RATE: 18 BRPM | OXYGEN SATURATION: 96 % | TEMPERATURE: 98 F | BODY MASS INDEX: 27.82 KG/M2 | SYSTOLIC BLOOD PRESSURE: 159 MMHG

## 2018-01-29 DIAGNOSIS — C78.6 SECONDARY ADENOCARCINOMA OF RETROPERITONEUM: ICD-10-CM

## 2018-01-29 DIAGNOSIS — C22.1 INTRAHEPATIC CHOLANGIOCARCINOMA: ICD-10-CM

## 2018-01-29 DIAGNOSIS — C22.1 INTRAHEPATIC CHOLANGIOCARCINOMA: Primary | ICD-10-CM

## 2018-01-29 DIAGNOSIS — C78.00 MALIGNANT NEOPLASM METASTATIC TO LUNG, UNSPECIFIED LATERALITY: ICD-10-CM

## 2018-01-29 LAB
ALBUMIN SERPL BCP-MCNC: 3.6 G/DL
ALP SERPL-CCNC: 61 U/L
ALT SERPL W/O P-5'-P-CCNC: 15 U/L
ANION GAP SERPL CALC-SCNC: 6 MMOL/L
AST SERPL-CCNC: 16 U/L
BILIRUB SERPL-MCNC: 1 MG/DL
BUN SERPL-MCNC: 18 MG/DL
CALCIUM SERPL-MCNC: 9.1 MG/DL
CHLORIDE SERPL-SCNC: 104 MMOL/L
CO2 SERPL-SCNC: 27 MMOL/L
CREAT SERPL-MCNC: 1.1 MG/DL
ERYTHROCYTE [DISTWIDTH] IN BLOOD BY AUTOMATED COUNT: 14.3 %
EST. GFR  (AFRICAN AMERICAN): >60 ML/MIN/1.73 M^2
EST. GFR  (NON AFRICAN AMERICAN): >60 ML/MIN/1.73 M^2
GLUCOSE SERPL-MCNC: 118 MG/DL
HCT VFR BLD AUTO: 39.6 %
HGB BLD-MCNC: 13.3 G/DL
IMM GRANULOCYTES # BLD AUTO: 0.02 K/UL
MCH RBC QN AUTO: 31.3 PG
MCHC RBC AUTO-ENTMCNC: 33.6 G/DL
MCV RBC AUTO: 93 FL
NEUTROPHILS # BLD AUTO: 3.8 K/UL
PLATELET # BLD AUTO: 246 K/UL
PMV BLD AUTO: 9.9 FL
POTASSIUM SERPL-SCNC: 4.7 MMOL/L
PROT SERPL-MCNC: 7.5 G/DL
RBC # BLD AUTO: 4.25 M/UL
SODIUM SERPL-SCNC: 137 MMOL/L
WBC # BLD AUTO: 5.72 K/UL

## 2018-01-29 PROCEDURE — 36415 COLL VENOUS BLD VENIPUNCTURE: CPT

## 2018-01-29 PROCEDURE — 80053 COMPREHEN METABOLIC PANEL: CPT

## 2018-01-29 PROCEDURE — 85027 COMPLETE CBC AUTOMATED: CPT

## 2018-01-29 PROCEDURE — 99999 PR PBB SHADOW E&M-EST. PATIENT-LVL IV: CPT | Mod: PBBFAC,,, | Performed by: INTERNAL MEDICINE

## 2018-01-29 PROCEDURE — 99215 OFFICE O/P EST HI 40 MIN: CPT | Mod: S$GLB,,, | Performed by: INTERNAL MEDICINE

## 2018-01-29 RX ORDER — PROMETHAZINE HYDROCHLORIDE 25 MG/1
25 TABLET ORAL EVERY 6 HOURS PRN
Qty: 60 TABLET | Refills: 7 | Status: SHIPPED | OUTPATIENT
Start: 2018-01-29 | End: 2018-02-05

## 2018-01-29 RX ORDER — SODIUM CHLORIDE 0.9 % (FLUSH) 0.9 %
10 SYRINGE (ML) INJECTION
Status: CANCELLED | OUTPATIENT
Start: 2018-02-02

## 2018-01-29 RX ORDER — OXYCODONE AND ACETAMINOPHEN 5; 325 MG/1; MG/1
1 TABLET ORAL EVERY 4 HOURS PRN
Qty: 60 TABLET | Refills: 0 | Status: SHIPPED | OUTPATIENT
Start: 2018-01-29 | End: 2018-08-13

## 2018-01-29 RX ORDER — HEPARIN 100 UNIT/ML
500 SYRINGE INTRAVENOUS
Status: CANCELLED | OUTPATIENT
Start: 2018-02-02

## 2018-01-29 RX ORDER — ONDANSETRON HYDROCHLORIDE 8 MG/1
8 TABLET, FILM COATED ORAL 4 TIMES DAILY PRN
Qty: 60 TABLET | Refills: 2 | Status: SHIPPED | OUTPATIENT
Start: 2018-01-29 | End: 2018-08-13 | Stop reason: ALTCHOICE

## 2018-01-29 NOTE — PATIENT INSTRUCTIONS
Cisplatin injection  What is this medicine?  CISPLATIN (SIS rekha tin) is a chemotherapy drug. It targets fast dividing cells, like cancer cells, and causes these cells to die. This medicine is used to treat many types of cancer like bladder, ovarian, and testicular cancers.  How should I use this medicine?  This drug is given as an infusion into a vein. It is administered in a hospital or clinic by a specially trained health care professional.  Talk to your pediatrician regarding the use of this medicine in children. Special care may be needed.  What side effects may I notice from receiving this medicine?  Side effects that you should report to your doctor or health care professional as soon as possible:  · allergic reactions like skin rash, itching or hives, swelling of the face, lips, or tongue  · signs of infection - fever or chills, cough, sore throat, pain or difficulty passing urine  · signs of decreased platelets or bleeding - bruising, pinpoint red spots on the skin, black, tarry stools, nosebleeds  · signs of decreased red blood cells - unusually weak or tired, fainting spells, lightheadedness  · breathing problems  · changes in hearing  · gout pain  · low blood counts - This drug may decrease the number of white blood cells, red blood cells and platelets. You may be at increased risk for infections and bleeding.  · nausea and vomiting  · pain, swelling, redness or irritation at the injection site  · pain, tingling, numbness in the hands or feet  · problems with balance, movement  · trouble passing urine or change in the amount of urine  Side effects that usually do not require medical attention (report to your doctor or health care professional if they continue or are bothersome):  · changes in vision  · loss of appetite  · metallic taste in the mouth or changes in taste  What may interact with this medicine?  · dofetilide  · foscarnet  · medicines for seizures  · medicines to increase blood counts like  filgrastim, pegfilgrastim, sargramostim  · probenecid  · pyridoxine used with altretamine  · rituximab  · some antibiotics like amikacin, gentamicin, neomycin, polymyxin B, streptomycin, tobramycin  · sulfinpyrazone  · vaccines  · zalcitabine  Talk to your doctor or health care professional before taking any of these medicines:  · acetaminophen  · aspirin  · ibuprofen  · ketoprofen  · naproxen  What if I miss a dose?  It is important not to miss a dose. Call your doctor or health care professional if you are unable to keep an appointment.  Where should I keep my medicine?  This drug is given in a hospital or clinic and will not be stored at home.  What should I tell my health care provider before I take this medicine?  They need to know if you have any of these conditions:  · blood disorders  · hearing problems  · kidney disease  · recent or ongoing radiation therapy  · an unusual or allergic reaction to cisplatin, carboplatin, other chemotherapy, other medicines, foods, dyes, or preservatives  · pregnant or trying to get pregnant  · breast-feeding  What should I watch for while using this medicine?  Your condition will be monitored carefully while you are receiving this medicine. You will need important blood work done while you are taking this medicine.  This drug may make you feel generally unwell. This is not uncommon, as chemotherapy can affect healthy cells as well as cancer cells. Report any side effects. Continue your course of treatment even though you feel ill unless your doctor tells you to stop.  In some cases, you may be given additional medicines to help with side effects. Follow all directions for their use.  Call your doctor or health care professional for advice if you get a fever, chills or sore throat, or other symptoms of a cold or flu. Do not treat yourself. This drug decreases your body's ability to fight infections. Try to avoid being around people who are sick.  This medicine may increase  your risk to bruise or bleed. Call your doctor or health care professional if you notice any unusual bleeding.  Be careful brushing and flossing your teeth or using a toothpick because you may get an infection or bleed more easily. If you have any dental work done, tell your dentist you are receiving this medicine.  Avoid taking products that contain aspirin, acetaminophen, ibuprofen, naproxen, or ketoprofen unless instructed by your doctor. These medicines may hide a fever.  Do not become pregnant while taking this medicine. Women should inform their doctor if they wish to become pregnant or think they might be pregnant. There is a potential for serious side effects to an unborn child. Talk to your health care professional or pharmacist for more information. Do not breast-feed an infant while taking this medicine.  Drink fluids as directed while you are taking this medicine. This will help protect your kidneys.  Call your doctor or health care professional if you get diarrhea. Do not treat yourself.  NOTE:This sheet is a summary. It may not cover all possible information. If you have questions about this medicine, talk to your doctor, pharmacist, or health care provider. Copyright© 2017 [employer]        Gemcitabine injection  What is this medicine?  GEMCITABINE (cali SIT a been) is a chemotherapy drug. This medicine is used to treat many types of cancer like breast cancer, lung cancer, pancreatic cancer, and ovarian cancer.  How should I use this medicine?  This drug is given as an infusion into a vein. It is administered in a hospital or clinic by a specially trained health care professional.  Talk to your pediatrician regarding the use of this medicine in children. Special care may be needed.  What side effects may I notice from receiving this medicine?  Side effects that you should report to your doctor or health care professional as soon as possible:  · allergic reactions like skin rash, itching or hives,  swelling of the face, lips, or tongue  · low blood counts - this medicine may decrease the number of white blood cells, red blood cells and platelets. You may be at increased risk for infections and bleeding.  · signs of infection - fever or chills, cough, sore throat, pain or difficulty passing urine  · signs of decreased platelets or bleeding - bruising, pinpoint red spots on the skin, black, tarry stools, blood in the urine  · signs of decreased red blood cells - unusually weak or tired, fainting spells, lightheadedness  · breathing problems  · chest pain  · mouth sores  · nausea and vomiting  · pain, swelling, redness at site where injected  · pain, tingling, numbness in the hands or feet  · stomach pain  · swelling of ankles, feet, hands  · unusual bleeding  Side effects that usually do not require medical attention (report to your doctor or health care professional if they continue or are bothersome):  · constipation  · diarrhea  · hair loss  · loss of appetite  · stomach upset  What may interact with this medicine?  · medicines to increase blood counts like filgrastim, pegfilgrastim, sargramostim  · some other chemotherapy drugs like cisplatin  · vaccines  Talk to your doctor or health care professional before taking any of these medicines:  · acetaminophen  · aspirin  · ibuprofen  · ketoprofen  · naproxen  What if I miss a dose?  It is important not to miss your dose. Call your doctor or health care professional if you are unable to keep an appointment.  Where should I keep my medicine?  This drug is given in a hospital or clinic and will not be stored at home.  What should I tell my health care provider before I take this medicine?  They need to know if you have any of these conditions:  · blood disorders  · infection  · kidney disease  · liver disease  · recent or ongoing radiation therapy  · an unusual or allergic reaction to gemcitabine, other chemotherapy, other medicines, foods, dyes, or  preservatives  · pregnant or trying to get pregnant  · breast-feeding  What should I watch for while using this medicine?  Visit your doctor for checks on your progress. This drug may make you feel generally unwell. This is not uncommon, as chemotherapy can affect healthy cells as well as cancer cells. Report any side effects. Continue your course of treatment even though you feel ill unless your doctor tells you to stop.  In some cases, you may be given additional medicines to help with side effects. Follow all directions for their use.  Call your doctor or health care professional for advice if you get a fever, chills or sore throat, or other symptoms of a cold or flu. Do not treat yourself. This drug decreases your body's ability to fight infections. Try to avoid being around people who are sick.  This medicine may increase your risk to bruise or bleed. Call your doctor or health care professional if you notice any unusual bleeding.  Be careful brushing and flossing your teeth or using a toothpick because you may get an infection or bleed more easily. If you have any dental work done, tell your dentist you are receiving this medicine.  Avoid taking products that contain aspirin, acetaminophen, ibuprofen, naproxen, or ketoprofen unless instructed by your doctor. These medicines may hide a fever.  Women should inform their doctor if they wish to become pregnant or think they might be pregnant. There is a potential for serious side effects to an unborn child. Talk to your health care professional or pharmacist for more information. Do not breast-feed an infant while taking this medicine.  NOTE:This sheet is a summary. It may not cover all possible information. If you have questions about this medicine, talk to your doctor, pharmacist, or health care provider. Copyright© 2017 [employer]

## 2018-01-29 NOTE — PROGRESS NOTES
"Subjective:       Patient ID: Eric Brown is a 67 y.o. male.    Chief Complaint: Cholangiocarcinoma  ONCOLOGIC HISTORY:  Mr. Brown is a 66 year old male who in 6/2012 underwent a distal pancreatectomy for a PNET and a liver resection for a T2N0M0 intrahepatic cholangiocarcinoma.  Unfortunately had a recurrence of the cholangiocarcinoma and underwent a repeat resection at [facility].  Completed adjuvant treatment with cisplatin and gemcitabine - 6 cycles.     He has been on surveillance.     HPIHe [facility] "Scattered subcentimeter pulmonary nodules, some new/larger than on 07/31/2017, nonspecific, possibly metastatic or inflammatory, to be followed.  Small metabolically active foci in the liver, at sites of previously noted subcentimeter hypervascular nodules on liver protocol CT dated 10/02/2017, concerning for metastases Metabolically active periportal and retroperitoneal adenopathy, larger than on 10/02/2017, consistent with metastatic disease. Spiculated soft tissue in the pancreatectomy bed region, with low-grade metabolic activity, most likely related to postsurgical change, but local recurrence cannot be entirely excluded, to be followed"  He underwent biopsy of soft tissue of peritoneum and pathology revealed metastatic cancer compatible with mets from cholangiocarcinoma. He has been recommended to start chemo with Cisplatin and gemzar and they are also evaluating him for trials.    He feels well and denies any complaints       Review of Systems   Constitutional: Negative for appetite change, fatigue and unexpected weight change.   HENT: Negative for mouth sores.    Eyes: Negative for visual disturbance.   Respiratory: Negative for cough and shortness of breath.    Cardiovascular: Negative for chest pain.   Gastrointestinal: Negative for abdominal pain and diarrhea.   Genitourinary: Negative for frequency.   Musculoskeletal: Negative for back pain.   Skin: " Negative for rash.   Neurological: Negative for headaches.   Hematological: Negative for adenopathy.   Psychiatric/Behavioral: The patient is not nervous/anxious.    All other systems reviewed and are negative.      Objective:      Physical Exam   Constitutional: He is oriented to person, place, and time. He appears well-developed and well-nourished. No distress.   HENT:   Right Ear: External ear normal.   Left Ear: External ear normal.   Nose: Nose normal.   Mouth/Throat: Oropharynx is clear and moist. No oropharyngeal exudate.   Teeth gums and lips  No sinus tenderness  Palate, tongue, posterior pharynx are normal   Eyes: Conjunctivae are normal.   Lids are normal   Neck: Trachea normal and normal range of motion. No thyromegaly present.   Cardiovascular: Normal rate, regular rhythm, normal heart sounds and intact distal pulses.    No murmur heard.  No edema, no tenderness in the extremities.    Pulmonary/Chest: Effort normal and breath sounds normal. He has no wheezes.   Abdominal: Soft. Bowel sounds are normal. He exhibits no distension and no mass. There is no hepatosplenomegaly. There is no tenderness.   Musculoskeletal: Normal range of motion. He exhibits no edema or tenderness.   Gait is normal  No clubbing cyanosis   Lymphadenopathy:        Head (right side): No submental and no submandibular adenopathy present.        Head (left side): No submental and no submandibular adenopathy present.     He has no cervical adenopathy.        Right: No supraclavicular adenopathy present.        Left: No supraclavicular adenopathy present.   Neurological: He is alert and oriented to person, place, and time. He has normal strength and normal reflexes. No sensory deficit. Coordination normal.   Skin: Skin is warm, dry and intact. No bruising, no lesion and no rash noted. No cyanosis. Nails show no clubbing.   Psychiatric: He has a normal mood and affect. His speech is normal and behavior is normal. Judgment and thought  content normal. Cognition and memory are normal.   Vitals reviewed.        LABS:  WBC   Date Value Ref Range Status   01/29/2018 5.72 3.90 - 12.70 K/uL Final     Hemoglobin   Date Value Ref Range Status   01/29/2018 13.3 (L) 14.0 - 18.0 g/dL Final     Hematocrit   Date Value Ref Range Status   01/29/2018 39.6 (L) 40.0 - 54.0 % Final     Platelets   Date Value Ref Range Status   01/29/2018 246 150 - 350 K/uL Final     Gran # (ANC)   Date Value Ref Range Status   01/29/2018 3.8 1.8 - 7.7 K/uL Final     Comment:     The ANC is based on a white cell differential from an   automated cell counter. It has not been microscopically   reviewed for the presence of abnormal cells. Clinical   correlation is required.         Chemistry        Component Value Date/Time     01/29/2018 1226    K 4.7 01/29/2018 1226     01/29/2018 1226    CO2 27 01/29/2018 1226    BUN 18 01/29/2018 1226    CREATININE 1.1 01/29/2018 1226     (H) 01/29/2018 1226        Component Value Date/Time    CALCIUM 9.1 01/29/2018 1226    ALKPHOS 61 01/29/2018 1226    AST 16 01/29/2018 1226    ALT 15 01/29/2018 1226    BILITOT 1.0 01/29/2018 1226    ESTGFRAFRICA >60.0 01/29/2018 1226    EGFRNONAA >60.0 01/29/2018 1226            Assessment:       1. Intrahepatic cholangiocarcinoma    2. Malignant neoplasm metastatic to lung, unspecified laterality    3. Secondary adenocarcinoma of retroperitoneum        Plan:        1,2,3. [facility] records were reviewed. Plan is to start Cisplatin and Gemzar (days 1 and 8 every 3 weeks). He signed consents today and he will start as soon as insurance approval is complete. I will see him for cycle 2. Also add neupogen on days 2,,3.    Above care plan was discussed with patient and accompanying wife and son and all questions were addressed to their satisfaction

## 2018-01-29 NOTE — TELEPHONE ENCOUNTER
----- Message from Amalia Canales MD sent at 1/29/2018 11:53 AM CST -----  Schedule CBC, CMP and start Cisplatin and Gemzar this week as soon as it is approved. Neupogen on days 2, 3.  Schedule CBC,CMp and day 8 of chemo of Cisplatin and Gemzar next week    Schedule CBC,CMp and see me the week of 2/19 and for Cisplatin and Gemzar

## 2018-01-29 NOTE — Clinical Note
Schedule CBC, CMP and start Cisplatin and Gemzar this week as soon as it is approved. Neupogen on days 2, 3. Schedule CBC,CMp and day 8 of chemo of Cisplatin and Gemzar next week  Schedule CBC,CMp and see me the week of 2/19 and for Cisplatin and Gemzar

## 2018-01-30 ENCOUNTER — OFFICE VISIT (OUTPATIENT)
Dept: SPORTS MEDICINE | Facility: CLINIC | Age: 68
End: 2018-01-30
Payer: MEDICARE

## 2018-01-30 ENCOUNTER — HOSPITAL ENCOUNTER (OUTPATIENT)
Dept: RADIOLOGY | Facility: HOSPITAL | Age: 68
Discharge: HOME | End: 2018-01-30
Attending: ORTHOPAEDIC SURGERY
Payer: MEDICARE

## 2018-01-30 VITALS
DIASTOLIC BLOOD PRESSURE: 76 MMHG | WEIGHT: 187 LBS | SYSTOLIC BLOOD PRESSURE: 129 MMHG | BODY MASS INDEX: 27.7 KG/M2 | HEIGHT: 69 IN | HEART RATE: 84 BPM

## 2018-01-30 DIAGNOSIS — M25.552 LEFT HIP PAIN: Primary | ICD-10-CM

## 2018-01-30 DIAGNOSIS — M25.552 LEFT HIP PAIN: ICD-10-CM

## 2018-01-30 PROCEDURE — 99999 PR PBB SHADOW E&M-EST. PATIENT-LVL III: CPT | Mod: PBBFAC,,, | Performed by: ORTHOPAEDIC SURGERY

## 2018-01-30 PROCEDURE — 73502 X-RAY EXAM HIP UNI 2-3 VIEWS: CPT | Mod: 26,LT,, | Performed by: RADIOLOGY

## 2018-01-30 PROCEDURE — 1159F MED LIST DOCD IN RCRD: CPT | Mod: S$GLB,,, | Performed by: ORTHOPAEDIC SURGERY

## 2018-01-30 PROCEDURE — 1125F AMNT PAIN NOTED PAIN PRSNT: CPT | Mod: S$GLB,,, | Performed by: ORTHOPAEDIC SURGERY

## 2018-01-30 PROCEDURE — 73502 X-RAY EXAM HIP UNI 2-3 VIEWS: CPT | Mod: TC,FY,PO,LT

## 2018-01-30 PROCEDURE — 3008F BODY MASS INDEX DOCD: CPT | Mod: S$GLB,,, | Performed by: ORTHOPAEDIC SURGERY

## 2018-01-30 PROCEDURE — 99214 OFFICE O/P EST MOD 30 MIN: CPT | Mod: S$GLB,,, | Performed by: ORTHOPAEDIC SURGERY

## 2018-01-30 NOTE — PROGRESS NOTES
CC: left hip pain    HPI:   Eric Brown is a pleasant 67 y.o. patient who reports to clinic with left hip pain. No trauma, no mech sxs/instabilty. Pt reports that he walks a few miles every day. Reports left hip pain for ~2 months. Locates the pain laterally. Has tried ibuprofen with some relief.         Affecting ADLs and exercising      Review of Systems   Constitution: Negative. Negative for chills, fever and night sweats.   HENT: Negative for congestion and headaches.    Eyes: Negative for blurred vision, left vision loss and right vision loss.   Cardiovascular: Negative for chest pain and syncope.   Respiratory: Negative for cough and shortness of breath.    Endocrine: Negative for polydipsia, polyphagia and polyuria.   Hematologic/Lymphatic: Negative for bleeding problem. Does not bruise/bleed easily.   Skin: Negative for dry skin, itching and rash.   Musculoskeletal: Negative for falls and muscle weakness.   Gastrointestinal: Negative for abdominal pain and bowel incontinence.   Genitourinary: Negative for bladder incontinence and nocturia.   Neurological: Negative for disturbances in coordination, loss of balance and seizures.   Psychiatric/Behavioral: Negative for depression. The patient does not have insomnia.    Allergic/Immunologic: Negative for hives and persistent infections.     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 EC    EXTRACORPOREAL SHOCK WAVE LITHOTRIPSY  2007 and 2008    litho      LIVER RESECTION  5-2012    Distal pancreatectomy    LIVER RESECTION  1-22-15    Hepatic cholangiocarcinoma    MITRAL VALVE SURGERY       FAMILY HISTORY:   Family History   Problem Relation Age of Onset    Hypertension Mother     Cataracts Father      SOCIAL HISTORY:   Social History     Social History    Marital status:      Spouse name: N/A    Number of children: N/A    Years of education: N/A     Occupational History     [employer]     Social History Main Topics    Smoking status: Former Smoker     Quit date: 2/23/1985    Smokeless tobacco: Not on file    Alcohol use No    Drug use: Unknown    Sexual activity: Not on file     Other Topics Concern    Not on file     Social History Narrative    No narrative on file       MEDICATIONS:   Current Outpatient Prescriptions:     allopurinol (ZYLOPRIM) 100 MG tablet, TAKE 1 TABLET (100 MG TOTAL) BY MOUTH ONCE DAILY., Disp: 90 tablet, Rfl: 3    aspirin 81 MG Chew, Take 81 mg by mouth once daily., Disp: , Rfl:     atorvastatin (LIPITOR) 10 MG tablet, Take 1 tablet (10 mg total) by mouth once daily., Disp: 90 tablet, Rfl: 3    felodipine (PLENDIL) 10 MG 24 hr tablet, TAKE 1 TABLET (10 MG TOTAL) BY MOUTH ONCE DAILY., Disp: 90 tablet, Rfl: 3    losartan (COZAAR) 50 MG tablet, TAKE 1 TABLET (50 MG TOTAL) BY MOUTH ONCE DAILY., Disp: 90 tablet, Rfl: 3    MULTIVIT-IRON-MIN-FOLIC ACID 3,500-18-0.4 UNIT-MG-MG ORAL CHEW, Take by mouth., Disp: , Rfl:     omeprazole (PRILOSEC) 20 MG capsule, , Disp: , Rfl:     ondansetron (ZOFRAN) 8 MG tablet, Take 1 tablet (8 mg total) by mouth 4 (four) times daily as needed for Nausea., Disp: 60 tablet, Rfl: 2    oxyCODONE-acetaminophen (PERCOCET)  "5-325 mg per tablet, Take 1 tablet by mouth every 4 (four) hours as needed for Pain., Disp: 60 tablet, Rfl: 0    promethazine (PHENERGAN) 25 MG tablet, Take 1 tablet (25 mg total) by mouth every 6 (six) hours as needed for Nausea., Disp: 60 tablet, Rfl: 7    tamsulosin (FLOMAX) 0.4 mg Cp24, Take 1 capsule (0.4 mg total) by mouth once daily., Disp: 90 capsule, Rfl: 3    diclofenac sodium (VOLTAREN) 1 % Gel, Apply 2 g topically 4 (four) times daily., Disp: 100 g, Rfl: 1  ALLERGIES:   Review of patient's allergies indicates:   Allergen Reactions    Iodine and iodide containing products Rash    Iohexol Hives and Rash     Rash on L leg, resolves in 1 hour. Pt did well with 13hr prep of Prednisone 50mg POx3 doses and Benadryl 50mg PO during CT done back in 2/2017.        VITAL SIGNS: /76   Pulse 84   Ht 5' 9" (1.753 m)   Wt 84.8 kg (187 lb)   BMI 27.62 kg/m²        PHYSICAL EXAM /  HIP  PHYSICAL EXAMINATION  General:  The patient is alert and oriented x 3.  Mood is pleasant.  Observation of ears, eyes and nose reveal no gross abnormalities.  HEENT: NCAT, sclera nonicteric  Lungs: Respirations are equal and unlabored..    left HIP EXAMINATION     OBSERVATION / INSPECTION  Gait:   Nonantalgic   Alignment:  Neutral   Scars:   None   Muscle atrophy: None   Effusion:  None   Warmth:  None   Discoloration:   None   Leg lengths:   Equal   Pelvis:   Level     TENDERNESS / CREPITUS (T/C):      T / C  Trochanteric bursa   + / -  Piriformis    - / -  SI joint    - / -  Psoas tendon   - / -  Rectus insertion  - / -  Adductor insertion  - / -  Pubic symphysis  - / -    ROM: (* = pain)    Flexion:    120 degrees  External rotation: 40 degrees  Internal rotation with axial load: 30 degrees  Internal rotation without axial load: 40 degrees  Abduction:  45 degrees  Adduction:   20 degrees    SPECIAL TESTS:  Pain w/ forced internal rotation (FADIR): -   Pain w/ forced external rotation (SACHIN): Absent   Circumduction " test:    -  Stinchfield test:    Negative   Log roll:      Negative   Snapping hip (internal):   Negative   Sit-up pain:     Negative   Resisted sit-up pain:    Negative   Resisted sit-up with adductor contraction pain:  Negative   Step-down test:    +  Trendelenburg test:    Negative  Bridge test     +     EXTREMITY NEURO-VASCULAR EXAMINATION:   Sensation:  Grossly intact to light touch all dermatomal regions.   Motor Function:  Fully intact motor function at hip, knee, foot and ankle    DTRs;  quadriceps and  achilles 2+.  No clonus and downgoing Babinski.    Vascular status:  DP and PT pulses 2+, brisk capillary refill, symmetric.    Skin:  intact, compartments soft.    OTHER FINDINGS:      XRAYS:      ASSESSMENT:    1. left hip abd/core weakness, left hip pain    PLAN:  1. PT for left hip abd/core strengthing and HEP  2. Voltaren gel  3. All questions were answered, pt will contact us for questions or concerns in the interim.

## 2018-01-31 ENCOUNTER — CLINICAL SUPPORT (OUTPATIENT)
Dept: REHABILITATION | Facility: HOSPITAL | Age: 68
End: 2018-01-31
Attending: ORTHOPAEDIC SURGERY
Payer: MEDICARE

## 2018-01-31 DIAGNOSIS — M25.552 LEFT HIP PAIN: ICD-10-CM

## 2018-01-31 DIAGNOSIS — M54.50 LUMBAR SPINE PAIN: Primary | ICD-10-CM

## 2018-01-31 PROCEDURE — 97162 PT EVAL MOD COMPLEX 30 MIN: CPT

## 2018-01-31 PROCEDURE — G8978 MOBILITY CURRENT STATUS: HCPCS | Mod: CK

## 2018-01-31 PROCEDURE — 97110 THERAPEUTIC EXERCISES: CPT

## 2018-01-31 PROCEDURE — G8979 MOBILITY GOAL STATUS: HCPCS | Mod: CJ

## 2018-01-31 NOTE — PROGRESS NOTES
[facility]  PATIENT EVALUATION    Date: 01/31/2018  Evaluation Date: 1/31/2018    Visit #: 1    Start Time:  1105  Stop Time:  1200      History     HPI: Patient reports left hip pain starting 3 weeks prior. He states he normally walks in the morning and states he recently started doing some squats. He continued to do this exercise and then started noticing pain in hip. He has taken some medication with no relief, but states ibuprofen which is helping. He states he gets some relief with sleeping on his left side. Patient was recommended to add pillow between knees.   Onset Date: 3 weeks prior  Date of Surgery:H/O open heart sx and tumor resection  Primary Diagnosis:   1. 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    Pain:  Location: L hip   Description: Throbbing and Sharp  Activities Which Increase Pain: Sitting and squatting  Activities Which Decrease Pain: ibprofen  Pain Scale: 2/10 at best 3/10 now  7/10 at worst    Objective   Observation: patient appears stated age  Palpation: TTP L GT  Sensation: WNL    Posture:    Posture  ASIS height    Iliac crest height     PSIS height    Cervical Spine    Thoracic Spine    Lumbar Spine    Greater Trochanter    Ischial Tuberosity    Knee    Foot position      Range of Motion:      Hip: Left Right   Flexion 115 110   Extension NT NT   Abduction 30 20   Adduction 30 25   Internal Rotation 29 27   External Rotation 30 30                         Strength:      Hip: Left Right   Abduction 3+/5 3/5   Adduction NT 5/5   Flexion 5/5 5/5   Extension 4/5 4/5   IR 4+/5 5/5   ER 5/5 5/5                       Knee:        Left Right   Extension  5/5 5/5   Flexion 5/5 5/5                       Functional Tests   Left Right   Bridge Test + +         Flexibility:        Left Right   SLR + +   Hamstring 90°/90°     Prone Rectus Femoris     Gastrocnemius     Soleus     Rubas     Piriformis + +   Psoas                           Gait: Without AD  Analysis: Patient did not show any obvious gait abnormalities    Special Tests:      - Scouring          +  Leg Length Discrepancy L ______  R __>___      SI Tests:  + SACHIN    + R anterior/L posterior SI dysfunction            Treatment:   SLR x15  Clamshells otb x15 B  Lateral walking x1 lap  Bridges x15  Seated HS stretch x30 sec B  Piriformis stretch x30 sec B  LTR x20        History  Co-morbidities and personal factors that may impact the plan of care Moderate    Evolving Clinical Presentation   Co-morbidities:       Personal Factors:     Body regions    Body systems    Activity Limitations    Participation Restrictons     1- 2  Personal factors/ combordities:   CA      Address  3 + elements:     ROM impaired  MMT impaired  Gait impaired  Decreased FOTO score             PT reviewed FOTO scores for Eric Brown on 1/31/2018    FOTO score: 56    Functional Limitations Reports - G Codes  Category: mobility  Tool: FOTO      Current ():  CK  Goal (): CJ  Discharge ():  NA            Assessment   This is a 67 y.o. male referred to outpatient  physical therapy and presents with a medical diagnosis of L hip pain with weakness and demonstrates limitations as described in the problem list. Pt demonstrates fair rehab potential. Pt will benefit from physcial therapy services in order to maximize pain free and/or functional use of left hip. The following goals were discussed with the patient and patient is in agreement with them as to be addressed in the treatment plan. Pt was given a HEP consisting of treatment this visit. Pt verbally understood the instructions as they were given and demonstrated proper form and technique during therapy. Pt was advised to perform these exercises free of pain, and to stop performing them if pain occurs.       Initial Assessment (Pertinent finding, problem list and factors affecting outcome):   Medical necessity is demonstrated by the following problem list:   - Pain limits function of effected part for all activities  - Unable to participate in daily activities   - Fall risk - impaired balance     Rehab Potiential: fair    Short Term Goals (2-3 Weeks):   - Pt will increase strength to 4/5 B hip abd  - Decrease Pain to 3/10 as worst  - Pt to self correct posture with minimal cues  - Pt independent with HEP with progressions.     Long Term Goals (6-8 Weeks):   - Pt will increase strength to 5/5 BLE  - Decrease Pain to 1/10 as worst  - Pt to return to 70% of PLOF    Plan   Pt will be seen 1x a week for 2-3 more visits once chemo treatment plan is established.   AROM/PROM exercise  Manual soft tissue and/or joint mobilization  Therapeutic Exercise   Individualized Home Exercise Program  Modalities: cryotherapy  Pt education:on HEP    Therapist: Carissa Abarca, PT, DPT    I CERTIFY THE NEED FOR THESE SERVICES FURNISHED UNDER THIS PLAN OF TREATMENT AND WHILE UNDER MY CARE    Physician's comments:  ________________________________________________________________________________________________________________________________________________    Physician's Name: ___________________________________    Please sign and return plan of care. Thank you for this referral.

## 2018-02-01 ENCOUNTER — LAB VISIT (OUTPATIENT)
Dept: LAB | Facility: HOSPITAL | Age: 68
End: 2018-02-01
Attending: INTERNAL MEDICINE
Payer: MEDICARE

## 2018-02-01 ENCOUNTER — OFFICE VISIT (OUTPATIENT)
Dept: INTERNAL MEDICINE | Facility: CLINIC | Age: 68
End: 2018-02-01
Payer: MEDICARE

## 2018-02-01 VITALS
HEART RATE: 76 BPM | WEIGHT: 188.06 LBS | DIASTOLIC BLOOD PRESSURE: 82 MMHG | SYSTOLIC BLOOD PRESSURE: 132 MMHG | HEIGHT: 69 IN | BODY MASS INDEX: 27.85 KG/M2

## 2018-02-01 DIAGNOSIS — I10 ESSENTIAL HYPERTENSION: ICD-10-CM

## 2018-02-01 DIAGNOSIS — Z12.5 PROSTATE CANCER SCREENING: ICD-10-CM

## 2018-02-01 DIAGNOSIS — C22.1 INTRAHEPATIC CHOLANGIOCARCINOMA: ICD-10-CM

## 2018-02-01 DIAGNOSIS — E78.5 HYPERLIPIDEMIA, UNSPECIFIED HYPERLIPIDEMIA TYPE: ICD-10-CM

## 2018-02-01 DIAGNOSIS — Z00.00 ANNUAL PHYSICAL EXAM: ICD-10-CM

## 2018-02-01 DIAGNOSIS — Z00.00 ANNUAL PHYSICAL EXAM: Primary | ICD-10-CM

## 2018-02-01 DIAGNOSIS — R73.03 PREDIABETES: ICD-10-CM

## 2018-02-01 DIAGNOSIS — R39.11 HESITANCY: ICD-10-CM

## 2018-02-01 LAB
BILIRUB UR QL STRIP: NEGATIVE
CHOLEST SERPL-MCNC: 194 MG/DL
CHOLEST/HDLC SERPL: 3.1 {RATIO}
CLARITY UR REFRACT.AUTO: CLEAR
COLOR UR AUTO: YELLOW
COMPLEXED PSA SERPL-MCNC: 2.4 NG/ML
CREAT UR-MCNC: 61 MG/DL
ESTIMATED AVG GLUCOSE: 108 MG/DL
GLUCOSE UR QL STRIP: NEGATIVE
HBA1C MFR BLD HPLC: 5.4 %
HDLC SERPL-MCNC: 62 MG/DL
HDLC SERPL: 32 %
HGB UR QL STRIP: NEGATIVE
KETONES UR QL STRIP: NEGATIVE
LDLC SERPL CALC-MCNC: 99.4 MG/DL
LEUKOCYTE ESTERASE UR QL STRIP: NEGATIVE
MICROALBUMIN UR DL<=1MG/L-MCNC: 10 UG/ML
MICROALBUMIN/CREATININE RATIO: 16.4 UG/MG
NITRITE UR QL STRIP: NEGATIVE
NONHDLC SERPL-MCNC: 132 MG/DL
PH UR STRIP: 7 [PH] (ref 5–8)
PROT UR QL STRIP: NEGATIVE
SP GR UR STRIP: 1.01 (ref 1–1.03)
TRIGL SERPL-MCNC: 163 MG/DL
URN SPEC COLLECT METH UR: NORMAL
UROBILINOGEN UR STRIP-ACNC: NEGATIVE EU/DL

## 2018-02-01 PROCEDURE — 36415 COLL VENOUS BLD VENIPUNCTURE: CPT

## 2018-02-01 PROCEDURE — 99999 PR PBB SHADOW E&M-EST. PATIENT-LVL III: CPT | Mod: PBBFAC,,, | Performed by: INTERNAL MEDICINE

## 2018-02-01 PROCEDURE — 99397 PER PM REEVAL EST PAT 65+ YR: CPT | Mod: S$GLB,,, | Performed by: INTERNAL MEDICINE

## 2018-02-01 PROCEDURE — 83036 HEMOGLOBIN GLYCOSYLATED A1C: CPT

## 2018-02-01 PROCEDURE — 80061 LIPID PANEL: CPT

## 2018-02-01 PROCEDURE — 81003 URINALYSIS AUTO W/O SCOPE: CPT

## 2018-02-01 PROCEDURE — 84153 ASSAY OF PSA TOTAL: CPT

## 2018-02-01 PROCEDURE — 82043 UR ALBUMIN QUANTITATIVE: CPT

## 2018-02-01 RX ORDER — TRAMADOL HYDROCHLORIDE 50 MG/1
50 TABLET ORAL
COMMUNITY
End: 2018-08-13 | Stop reason: ALTCHOICE

## 2018-02-01 NOTE — PROGRESS NOTES
CHIEF COMPLAINT:  Physical exam.    HISTORY OF PRESENT ILLNESS:  The patient is a 67-year-old gentleman who we see   for hypertension, hyperlipidemia as well as BPH and prediabetes who comes in   today for annual physical exam.  He does have a history of cholangiocarcinoma.    Evidently, the patient has had a recurrence with metastatic lesions to the lung.    He is planning on starting cisplatin and Gemzar.  He is currently being   treated by Dr. Canales.  This was proven by biopsy approximately three weeks ago.    The patient was also seen by Dr. Fowler for some left hip discomfort.  He   is getting physical therapy, which is helping.    REVIEW OF SYSTEMS:  Weight has been stable.  He does have problems with   cataracts, but wants to concentrate on chemotherapy currently before having lens   replacements.  No auditory changes, no dysphagia, no fever or chills.  No chest   pain or shortness of breath.  He has been walking one to two miles a day.  No   nausea, vomiting, abdominal pain, no bowel changes.  He does have trouble with   starting on urination, lasts about 10 to 15 seconds.  He is on tamsulosin   currently.  No weakness in the arms or legs.  No skin changes.  No numbness or   tingling in the arms or legs.    SCREENING:  Last colonoscopy was in 2015.    PHYSICAL EXAMINATION:  GENERAL APPEARANCE:  No acute distress.  HEENT:  Conjunctivae clear.  Pupils are equal.  He does have bilateral   cataracts.  TMs are clear.  Nasal septum is midline without discharge.    Oropharynx is without erythema.  NECK:  Trachea is midline without JVD, without thyromegaly.  PULMONARY:  Good inspiratory, expiratory breath sounds were heard.  His lungs   were clear to auscultation.  CARDIOVASCULAR:  S1, S2.  Rhythm was regular.  2+ carotid pulses without bruits.  EXTREMITIES:  Without edema.  ABDOMEN:  Nontender, nondistended, without hepatosplenomegaly.  RECTAL:  A digital rectal exam was performed.  The rectum was normal.   Stool was   dark brown in color, but heme negative.  GENITOURINARY:  Penis and scrotum were normal.  Prostate without masses or   nodules.    ASSESSMENT:  1. Physical exam.  2.  Prediabetes.  3.  Hypertension.  4.  Hyperlipidemia.  5.  Cholangiocarcinoma.  6.  Urinary hesitancy.    PLAN:  We will put the patient in for an A1c, UA and culture, PSA, urine for   microalbumin, lipid panel.  The patient is to follow up pending results.      PREM/RYNE  dd: 02/01/2018 10:02:44 (CST)  td: 02/02/2018 05:08:44 (CST)  Doc ID   #[id number]  Job ID #[id number]    CC:

## 2018-02-02 ENCOUNTER — TELEPHONE (OUTPATIENT)
Dept: HEMATOLOGY/ONCOLOGY | Facility: CLINIC | Age: 68
End: 2018-02-02

## 2018-02-02 ENCOUNTER — OFFICE VISIT (OUTPATIENT)
Dept: HEMATOLOGY/ONCOLOGY | Facility: CLINIC | Age: 68
End: 2018-02-02
Payer: MEDICARE

## 2018-02-02 ENCOUNTER — PATIENT MESSAGE (OUTPATIENT)
Dept: HEMATOLOGY/ONCOLOGY | Facility: CLINIC | Age: 68
End: 2018-02-02

## 2018-02-02 ENCOUNTER — INFUSION (OUTPATIENT)
Dept: INFUSION THERAPY | Facility: HOSPITAL | Age: 68
End: 2018-02-02
Attending: INTERNAL MEDICINE
Payer: MEDICARE

## 2018-02-02 VITALS
RESPIRATION RATE: 18 BRPM | SYSTOLIC BLOOD PRESSURE: 137 MMHG | TEMPERATURE: 97 F | DIASTOLIC BLOOD PRESSURE: 73 MMHG | HEART RATE: 74 BPM

## 2018-02-02 VITALS
BODY MASS INDEX: 27.92 KG/M2 | OXYGEN SATURATION: 97 % | DIASTOLIC BLOOD PRESSURE: 82 MMHG | HEART RATE: 75 BPM | WEIGHT: 188.5 LBS | SYSTOLIC BLOOD PRESSURE: 142 MMHG | HEIGHT: 69 IN | TEMPERATURE: 98 F | RESPIRATION RATE: 18 BRPM

## 2018-02-02 DIAGNOSIS — C78.00 MALIGNANT NEOPLASM METASTATIC TO LUNG, UNSPECIFIED LATERALITY: ICD-10-CM

## 2018-02-02 DIAGNOSIS — C22.1 INTRAHEPATIC CHOLANGIOCARCINOMA: Primary | ICD-10-CM

## 2018-02-02 DIAGNOSIS — C22.1 CHOLANGIOCARCINOMA: Primary | ICD-10-CM

## 2018-02-02 PROCEDURE — 96367 TX/PROPH/DG ADDL SEQ IV INF: CPT

## 2018-02-02 PROCEDURE — 1126F AMNT PAIN NOTED NONE PRSNT: CPT | Mod: S$GLB,,, | Performed by: INTERNAL MEDICINE

## 2018-02-02 PROCEDURE — 96413 CHEMO IV INFUSION 1 HR: CPT

## 2018-02-02 PROCEDURE — 96417 CHEMO IV INFUS EACH ADDL SEQ: CPT

## 2018-02-02 PROCEDURE — 99215 OFFICE O/P EST HI 40 MIN: CPT | Mod: S$GLB,,, | Performed by: INTERNAL MEDICINE

## 2018-02-02 PROCEDURE — 3008F BODY MASS INDEX DOCD: CPT | Mod: S$GLB,,, | Performed by: INTERNAL MEDICINE

## 2018-02-02 PROCEDURE — 63600175 PHARM REV CODE 636 W HCPCS: Mod: JG | Performed by: INTERNAL MEDICINE

## 2018-02-02 PROCEDURE — 99999 PR PBB SHADOW E&M-EST. PATIENT-LVL IV: CPT | Mod: PBBFAC,,, | Performed by: INTERNAL MEDICINE

## 2018-02-02 PROCEDURE — 1159F MED LIST DOCD IN RCRD: CPT | Mod: S$GLB,,, | Performed by: INTERNAL MEDICINE

## 2018-02-02 PROCEDURE — 96361 HYDRATE IV INFUSION ADD-ON: CPT

## 2018-02-02 PROCEDURE — 25000003 PHARM REV CODE 250: Performed by: INTERNAL MEDICINE

## 2018-02-02 RX ORDER — SODIUM CHLORIDE 0.9 % (FLUSH) 0.9 %
10 SYRINGE (ML) INJECTION
Status: DISCONTINUED | OUTPATIENT
Start: 2018-02-02 | End: 2018-02-02 | Stop reason: HOSPADM

## 2018-02-02 RX ADMIN — SODIUM CHLORIDE 150 MG: 9 INJECTION, SOLUTION INTRAVENOUS at 10:02

## 2018-02-02 RX ADMIN — DEXAMETHASONE SODIUM PHOSPHATE: 4 INJECTION, SOLUTION INTRAMUSCULAR; INTRAVENOUS at 09:02

## 2018-02-02 RX ADMIN — SODIUM CHLORIDE: 0.9 INJECTION, SOLUTION INTRAVENOUS at 08:02

## 2018-02-02 RX ADMIN — SODIUM CHLORIDE: 0.9 INJECTION, SOLUTION INTRAVENOUS at 12:02

## 2018-02-02 RX ADMIN — GEMCITABINE HYDROCHLORIDE 2040 MG: 1 INJECTION, POWDER, LYOPHILIZED, FOR SOLUTION INTRAVENOUS at 11:02

## 2018-02-02 RX ADMIN — CISPLATIN 51 MG: 100 INJECTION, SOLUTION INTRAVENOUS at 10:02

## 2018-02-02 NOTE — PLAN OF CARE
Problem: Patient Care Overview  Goal: Plan of Care Review  Outcome: Ongoing (interventions implemented as appropriate)  Pt tolerated cisplatin/gemzar without issue, avs given, pt to rtc 2/9/18, no distress noted upon d/c to home

## 2018-02-02 NOTE — TELEPHONE ENCOUNTER
----- Message from Amalia Canales MD sent at 2/2/2018  8:06 AM CST -----  Please make sure Neupogen gets approved for tomorrow (2/10) and Monday (2/12)  Needs appointment for neupogen on 2/10 and 2/11  Schedule CBC, CMP and see me in 1 week and for Cisplatin and gemzar. neulasta OBI.  Neulasta OBI needs auth also

## 2018-02-02 NOTE — PROGRESS NOTES
"Subjective:       Patient ID: Eric Brown is a 67 y.o. male.    Chief Complaint: Intrahepatic cholangiocarcinoma  ONCOLOGIC HISTORY:  Mr. Brown is a 67 year old male who in 6/2012 underwent a distal pancreatectomy for a PNET and a liver resection for a T2N0M0 intrahepatic cholangiocarcinoma.  Unfortunately had a recurrence of the cholangiocarcinoma and underwent a repeat resection at [facility].  Completed adjuvant treatment with cisplatin and gemcitabine - 6 cycles.     He has been on surveillance.     [facility] "Scattered subcentimeter pulmonary nodules, some new/larger than on 07/31/2017, nonspecific, possibly metastatic or inflammatory, to be followed.  Small metabolically active foci in the liver, at sites of previously noted subcentimeter hypervascular nodules on liver protocol CT dated 10/02/2017, concerning for metastases Metabolically active periportal and retroperitoneal adenopathy, larger than on 10/02/2017, consistent with metastatic disease. Spiculated soft tissue in the pancreatectomy bed region, with low-grade metabolic activity, most likely related to postsurgical change, but local recurrence cannot be entirely excluded, to be followed"  He underwent biopsy of soft tissue of peritoneum and pathology revealed metastatic cancer compatible with mets from cholangiocarcinoma. He has been recommended to start chemo with Cisplatin and gemzar and they are also evaluating him for trials.    HPIHe comes in today to start Cisplatin and Gemzar. He feels well and denies any new complaints      Review of Systems   Constitutional: Negative for appetite change, fatigue and unexpected weight change.   HENT: Negative for mouth sores.    Eyes: Negative for visual disturbance.   Respiratory: Negative for cough and shortness of breath.    Cardiovascular: Negative for chest pain.   Gastrointestinal: Negative for abdominal pain and diarrhea.   Genitourinary: Negative " for frequency.   Musculoskeletal: Negative for back pain.   Skin: Negative for rash.   Neurological: Negative for headaches.   Hematological: Negative for adenopathy.   Psychiatric/Behavioral: The patient is not nervous/anxious.    All other systems reviewed and are negative.      PMFSH: all information reviewed and updated as relevant to today's visit  Objective:      Physical Exam   Constitutional: He is oriented to person, place, and time. He appears well-developed and well-nourished.   HENT:   Mouth/Throat: No oropharyngeal exudate.   Cardiovascular: Normal rate and normal heart sounds.    Pulmonary/Chest: Effort normal and breath sounds normal. He has no wheezes.   Abdominal: Soft. Bowel sounds are normal. There is no tenderness.   Musculoskeletal: He exhibits no edema or tenderness.   Lymphadenopathy:     He has no cervical adenopathy.   Neurological: He is alert and oriented to person, place, and time. Coordination normal.   Skin: Skin is warm and dry. No rash noted.   Psychiatric: He has a normal mood and affect. Judgment and thought content normal.   Vitals reviewed.      LABS:  WBC   Date Value Ref Range Status   02/02/2018 7.05 3.90 - 12.70 K/uL Final     Hemoglobin   Date Value Ref Range Status   02/02/2018 13.5 (L) 14.0 - 18.0 g/dL Final     Hematocrit   Date Value Ref Range Status   02/02/2018 40.6 40.0 - 54.0 % Final     Platelets   Date Value Ref Range Status   02/02/2018 255 150 - 350 K/uL Final     Gran # (ANC)   Date Value Ref Range Status   02/02/2018 4.6 1.8 - 7.7 K/uL Final     Comment:     The ANC is based on a white cell differential from an   automated cell counter. It has not been microscopically   reviewed for the presence of abnormal cells. Clinical   correlation is required.         Chemistry        Component Value Date/Time     02/02/2018 0716    K 4.7 02/02/2018 0716     02/02/2018 0716    CO2 25 02/02/2018 0716    BUN 22 02/02/2018 0716    CREATININE 1.1 02/02/2018 0716      (H) 02/02/2018 0716        Component Value Date/Time    CALCIUM 9.5 02/02/2018 0716    ALKPHOS 62 02/02/2018 0716    AST 15 02/02/2018 0716    ALT 14 02/02/2018 0716    BILITOT 1.0 02/02/2018 0716    ESTGFRAFRICA >60.0 02/02/2018 0716    EGFRNONAA >60.0 02/02/2018 0716          Assessment:       1. Cholangiocarcinoma    2. Malignant neoplasm metastatic to lung, unspecified laterality        Plan:         1,2. He is doing well clinically and will proceed with cycle 1, day 1 of palliative chemo with Cisplatin and Gemzar and will return in 1 week for day 8. Neupogen on days 2 and 3. neulasta OBI on day 8.  Patient was consented for chemotherapy today 2/2/2018 .   An extensive discussion was had which included a thorough discussion of the risk and benefits of treatment and alternatives.  Risks, including but not limited to, possible hair loss, bone marrow damage (anemia, thrombocytopenia, immune suppression, neutropenia), damage to body organs (brain, heart, liver, kidney, lungs, nervous system, skin, and others), allergic reactions, sterility, nausea/vomiting, constipation/diarrhea, sores in the mouth, secondary cancers, local damage at possible injection sites, and rarely death were all discussed.  The patient agrees with the plan, and all questions have been answered to their satisfaction.  Consent was signed the patient, provider, and a third party witness.      Above care plan was discussed with patient and accompanying wife and all questions were addressed to their satisfaction

## 2018-02-02 NOTE — PLAN OF CARE
Problem: Chemotherapy Effects (Adult)  Goal: Signs and Symptoms of Listed Potential Problems Will be Absent, Minimized or Managed (Chemotherapy Effects)  Signs and symptoms of listed potential problems will be absent, minimized or managed by discharge/transition of care (reference Chemotherapy Effects (Adult) CPG).  Outcome: Ongoing (interventions implemented as appropriate)  Pt here for cisplatin/gemzar infusion D1C1 (pt rec'd in 2015), labs, hx, meds, allergies reviewed, pt with no complaints at this time, reclined in chair, continue to monitor

## 2018-02-02 NOTE — Clinical Note
Please make sure Neupogen gets approved for tomorrow (2/10) and Monday (2/12) Needs appointment for neupogen on 2/10 and 2/11 Schedule CBC, CMP and see me in 1 week and for Cisplatin and gemzar. neulasta OBI. Neulasta OBI needs auth also

## 2018-02-03 ENCOUNTER — INFUSION (OUTPATIENT)
Dept: INFUSION THERAPY | Facility: HOSPITAL | Age: 68
End: 2018-02-03
Attending: INTERNAL MEDICINE
Payer: MEDICARE

## 2018-02-03 DIAGNOSIS — C22.1 INTRAHEPATIC CHOLANGIOCARCINOMA: Primary | ICD-10-CM

## 2018-02-03 PROCEDURE — 63600175 PHARM REV CODE 636 W HCPCS: Mod: JG | Performed by: INTERNAL MEDICINE

## 2018-02-03 PROCEDURE — 96372 THER/PROPH/DIAG INJ SC/IM: CPT

## 2018-02-03 RX ADMIN — FILGRASTIM 480 MCG: 480 INJECTION, SOLUTION INTRAVENOUS; SUBCUTANEOUS at 10:02

## 2018-02-05 ENCOUNTER — INFUSION (OUTPATIENT)
Dept: INFUSION THERAPY | Facility: HOSPITAL | Age: 68
End: 2018-02-05
Attending: INTERNAL MEDICINE
Payer: MEDICARE

## 2018-02-05 DIAGNOSIS — C22.1 INTRAHEPATIC CHOLANGIOCARCINOMA: Primary | ICD-10-CM

## 2018-02-05 PROCEDURE — 96372 THER/PROPH/DIAG INJ SC/IM: CPT

## 2018-02-05 PROCEDURE — 63600175 PHARM REV CODE 636 W HCPCS: Mod: JG | Performed by: INTERNAL MEDICINE

## 2018-02-05 RX ADMIN — FILGRASTIM 480 MCG: 480 INJECTION, SOLUTION INTRAVENOUS; SUBCUTANEOUS at 11:02

## 2018-02-09 ENCOUNTER — TELEPHONE (OUTPATIENT)
Dept: HEMATOLOGY/ONCOLOGY | Facility: CLINIC | Age: 68
End: 2018-02-09

## 2018-02-09 ENCOUNTER — INFUSION (OUTPATIENT)
Dept: INFUSION THERAPY | Facility: HOSPITAL | Age: 68
End: 2018-02-09
Attending: INTERNAL MEDICINE
Payer: MEDICARE

## 2018-02-09 ENCOUNTER — OFFICE VISIT (OUTPATIENT)
Dept: HEMATOLOGY/ONCOLOGY | Facility: CLINIC | Age: 68
End: 2018-02-09
Payer: MEDICARE

## 2018-02-09 VITALS
HEIGHT: 69 IN | TEMPERATURE: 98 F | HEART RATE: 68 BPM | WEIGHT: 188.06 LBS | OXYGEN SATURATION: 99 % | RESPIRATION RATE: 20 BRPM | DIASTOLIC BLOOD PRESSURE: 78 MMHG | SYSTOLIC BLOOD PRESSURE: 136 MMHG | BODY MASS INDEX: 27.85 KG/M2

## 2018-02-09 VITALS
HEART RATE: 66 BPM | SYSTOLIC BLOOD PRESSURE: 132 MMHG | TEMPERATURE: 98 F | DIASTOLIC BLOOD PRESSURE: 74 MMHG | RESPIRATION RATE: 18 BRPM

## 2018-02-09 DIAGNOSIS — T45.1X5A CHEMOTHERAPY INDUCED NEUTROPENIA: ICD-10-CM

## 2018-02-09 DIAGNOSIS — C78.00 MALIGNANT NEOPLASM METASTATIC TO LUNG, UNSPECIFIED LATERALITY: ICD-10-CM

## 2018-02-09 DIAGNOSIS — C22.1 INTRAHEPATIC CHOLANGIOCARCINOMA: Primary | ICD-10-CM

## 2018-02-09 DIAGNOSIS — D70.1 CHEMOTHERAPY INDUCED NEUTROPENIA: ICD-10-CM

## 2018-02-09 DIAGNOSIS — C78.6 SECONDARY ADENOCARCINOMA OF RETROPERITONEUM: ICD-10-CM

## 2018-02-09 PROCEDURE — 96417 CHEMO IV INFUS EACH ADDL SEQ: CPT

## 2018-02-09 PROCEDURE — 63600175 PHARM REV CODE 636 W HCPCS: Performed by: INTERNAL MEDICINE

## 2018-02-09 PROCEDURE — 25000003 PHARM REV CODE 250: Performed by: INTERNAL MEDICINE

## 2018-02-09 PROCEDURE — 96375 TX/PRO/DX INJ NEW DRUG ADDON: CPT

## 2018-02-09 PROCEDURE — 3008F BODY MASS INDEX DOCD: CPT | Mod: S$GLB,,, | Performed by: INTERNAL MEDICINE

## 2018-02-09 PROCEDURE — 96361 HYDRATE IV INFUSION ADD-ON: CPT

## 2018-02-09 PROCEDURE — 96367 TX/PROPH/DG ADDL SEQ IV INF: CPT

## 2018-02-09 PROCEDURE — 99999 PR PBB SHADOW E&M-EST. PATIENT-LVL IV: CPT | Mod: PBBFAC,,, | Performed by: INTERNAL MEDICINE

## 2018-02-09 PROCEDURE — 96413 CHEMO IV INFUSION 1 HR: CPT

## 2018-02-09 PROCEDURE — 1159F MED LIST DOCD IN RCRD: CPT | Mod: S$GLB,,, | Performed by: INTERNAL MEDICINE

## 2018-02-09 PROCEDURE — 99215 OFFICE O/P EST HI 40 MIN: CPT | Mod: S$GLB,,, | Performed by: INTERNAL MEDICINE

## 2018-02-09 PROCEDURE — 1125F AMNT PAIN NOTED PAIN PRSNT: CPT | Mod: S$GLB,,, | Performed by: INTERNAL MEDICINE

## 2018-02-09 RX ORDER — HEPARIN 100 UNIT/ML
500 SYRINGE INTRAVENOUS
Status: CANCELLED | OUTPATIENT
Start: 2018-02-09

## 2018-02-09 RX ORDER — SODIUM CHLORIDE 0.9 % (FLUSH) 0.9 %
10 SYRINGE (ML) INJECTION
Status: CANCELLED | OUTPATIENT
Start: 2018-02-09

## 2018-02-09 RX ORDER — HEPARIN 100 UNIT/ML
500 SYRINGE INTRAVENOUS
Status: DISCONTINUED | OUTPATIENT
Start: 2018-02-09 | End: 2018-02-09 | Stop reason: HOSPADM

## 2018-02-09 RX ORDER — SODIUM CHLORIDE 0.9 % (FLUSH) 0.9 %
10 SYRINGE (ML) INJECTION
Status: DISCONTINUED | OUTPATIENT
Start: 2018-02-09 | End: 2018-02-09 | Stop reason: HOSPADM

## 2018-02-09 RX ADMIN — CISPLATIN 51 MG: 100 INJECTION, SOLUTION INTRAVENOUS at 11:02

## 2018-02-09 RX ADMIN — DEXAMETHASONE SODIUM PHOSPHATE: 4 INJECTION, SOLUTION INTRAMUSCULAR; INTRAVENOUS at 10:02

## 2018-02-09 RX ADMIN — SODIUM CHLORIDE: 0.9 INJECTION, SOLUTION INTRAVENOUS at 09:02

## 2018-02-09 RX ADMIN — SODIUM CHLORIDE 150 MG: 0.9 INJECTION, SOLUTION INTRAVENOUS at 10:02

## 2018-02-09 RX ADMIN — SODIUM CHLORIDE: 0.9 INJECTION, SOLUTION INTRAVENOUS at 01:02

## 2018-02-09 RX ADMIN — GEMCITABINE HYDROCHLORIDE 2040 MG: 1 INJECTION, POWDER, LYOPHILIZED, FOR SOLUTION INTRAVENOUS at 12:02

## 2018-02-09 NOTE — PROGRESS NOTES
"Subjective:       Patient ID: Eric Brown is a 67 y.o. male.    Chief Complaint: Cholangiocarcinoma  ONCOLOGIC HISTORY:  Mr. Brown is a 67 year old male who in 6/2012 underwent a distal pancreatectomy for a PNET and a liver resection for a T2N0M0 intrahepatic cholangiocarcinoma.  Unfortunately had a recurrence of the cholangiocarcinoma and underwent a repeat resection at [facility].  Completed adjuvant treatment with cisplatin and gemcitabine - 6 cycles.     He has been on surveillance.     [facility] "Scattered subcentimeter pulmonary nodules, some new/larger than on 07/31/2017, nonspecific, possibly metastatic or inflammatory, to be followed.  Small metabolically active foci in the liver, at sites of previously noted subcentimeter hypervascular nodules on liver protocol CT dated 10/02/2017, concerning for metastases Metabolically active periportal and retroperitoneal adenopathy, larger than on 10/02/2017, consistent with metastatic disease. Spiculated soft tissue in the pancreatectomy bed region, with low-grade metabolic activity, most likely related to postsurgical change, but local recurrence cannot be entirely excluded, to be followed"  He underwent biopsy of soft tissue of peritoneum and pathology revealed metastatic cancer compatible with mets from cholangiocarcinoma. He has been recommended to start chemo with Cisplatin and gemzar and they are also evaluating him for trials.       HPI He comes in today for cycle 1, day 8 Cisplatin and Gemzar  Mild nausea and body aches since chemo. He takes antiemetics for nausea and that helps. He takes Advil on occasion for body aches.  He denies any vomiting, diarrhea, abdominal pain, weight loss or loss of appetite, chest pain, shortness of breath, leg swelling, fatigue, pain, headache, dizziness, or mood changes. His ECOG PS is zero. He is accompanied by his wife.         Review of Systems   Constitutional: Negative " for appetite change, fatigue and unexpected weight change.   HENT: Negative for mouth sores.    Eyes: Negative for visual disturbance.   Respiratory: Negative for cough and shortness of breath.    Cardiovascular: Negative for chest pain.   Gastrointestinal: Positive for constipation and nausea. Negative for abdominal pain and diarrhea.   Genitourinary: Negative for frequency.   Musculoskeletal: Positive for myalgias. Negative for back pain.   Skin: Negative for rash.   Neurological: Negative for headaches.   Hematological: Negative for adenopathy.   Psychiatric/Behavioral: The patient is not nervous/anxious.    All other systems reviewed and are negative.      Objective:      Physical Exam   Constitutional: He is oriented to person, place, and time. He appears well-developed and well-nourished.   HENT:   Mouth/Throat: No oropharyngeal exudate.   Cardiovascular: Normal rate and normal heart sounds.    Pulmonary/Chest: Effort normal and breath sounds normal. He has no wheezes.   Abdominal: Soft. Bowel sounds are normal. There is no tenderness.   Musculoskeletal: He exhibits no edema or tenderness.   Lymphadenopathy:     He has no cervical adenopathy.   Neurological: He is alert and oriented to person, place, and time. Coordination normal.   Skin: Skin is warm and dry. No rash noted.   Psychiatric: He has a normal mood and affect. Judgment and thought content normal.   Vitals reviewed.      LABS:  WBC   Date Value Ref Range Status   02/09/2018 9.28 3.90 - 12.70 K/uL Final     Hemoglobin   Date Value Ref Range Status   02/09/2018 12.8 (L) 14.0 - 18.0 g/dL Final     Hematocrit   Date Value Ref Range Status   02/09/2018 38.3 (L) 40.0 - 54.0 % Final     Platelets   Date Value Ref Range Status   02/09/2018 181 150 - 350 K/uL Final     Gran # (ANC)   Date Value Ref Range Status   02/09/2018 6.2 1.8 - 7.7 K/uL Final     Comment:     The ANC is based on a white cell differential from an   automated cell counter. It has not  been microscopically   reviewed for the presence of abnormal cells. Clinical   correlation is required.         Chemistry        Component Value Date/Time     02/09/2018 0723    K 5.8 (H) 02/09/2018 0723     02/09/2018 0723    CO2 27 02/09/2018 0723    BUN 22 02/09/2018 0723    CREATININE 1.2 02/09/2018 0723     (H) 02/09/2018 0723        Component Value Date/Time    CALCIUM 9.6 02/09/2018 0723    ALKPHOS 86 02/09/2018 0723    AST 15 02/09/2018 0723    ALT 18 02/09/2018 0723    BILITOT 0.4 02/09/2018 0723    ESTGFRAFRICA >60.0 02/09/2018 0723    EGFRNONAA >60.0 02/09/2018 0723          Assessment:       1. Intrahepatic cholangiocarcinoma    2. Malignant neoplasm metastatic to lung, unspecified laterality    3. Secondary adenocarcinoma of retroperitoneum    4. Chemotherapy induced neutropenia        Plan:        1,2,3,4. He is doing well clinically and will proceed with cycle 1, day 8 of Cisplatina nd Gemzar and will return in 2 week s for cycle 2. Neulasta on day 9.    Above care plan was discussed with patient and accompanying wife and all questions were addressed to their satisfaction

## 2018-02-09 NOTE — TELEPHONE ENCOUNTER
----- Message from Amalia Canales MD sent at 2/9/2018 12:55 PM CST -----  Also change neulasta to day 9 for next cycle

## 2018-02-09 NOTE — NURSING
1005  Pt viewed Neulasta OBI video, states he prefers to have traditional Neulasta injection next day; notified AZAR Rankin RN to request MD Berriosti change order; notified SOFIA Jason RN to add pt to schedule on Sat 2/10/18

## 2018-02-09 NOTE — PLAN OF CARE
Problem: Patient Care Overview  Goal: Plan of Care Review  Outcome: Ongoing (interventions implemented as appropriate)  Infusion completed, pt tolerated well; pt instructed to remain well-hydrated; instructed to contact MD for any needs or concerns; pt declined printed AVS, verbalized understanding of all discussed and to report on Sat 2/10/18 at 1130 for Neulasta injection

## 2018-02-09 NOTE — TELEPHONE ENCOUNTER
----- Message from Amalia Canales MD sent at 2/9/2018  8:04 AM CST -----  Schedule CBC,CMP and see me on 2/23 and 3/2 and for Cisplatin and Gemzar   Neupogen on 2/24 and 2/25  Neulasta OBI on 3/2

## 2018-02-09 NOTE — PLAN OF CARE
Problem: Chemotherapy Effects (Adult)  Goal: Signs and Symptoms of Listed Potential Problems Will be Absent, Minimized or Managed (Chemotherapy Effects)  Signs and symptoms of listed potential problems will be absent, minimized or managed by discharge/transition of care (reference Chemotherapy Effects (Adult) CPG).   Outcome: Ongoing (interventions implemented as appropriate)  Pt here for Cisplatin, Gemzar infusion, accompanied by spouse, no complaints or concerns at present; reports intermittent constipation following treatment, typically one day following treatment (none at present); discussed treatment plan, pt will view Neulasta TUSHAR video, pt agrees to proceed with treatment today

## 2018-02-09 NOTE — Clinical Note
Schedule CBC,CMP and see me on 2/23 and 3/2 and for Cisplatin and Gemzar  Neupogen on 2/24 and 2/25 Neulasta OBI on 3/2

## 2018-02-09 NOTE — PATIENT INSTRUCTIONS
Potassium-Rich Foods  The normal adult diet usually contains 2,000 mg to 4,000 mg of potassium per day. More potassium is needed when you lose too much potassium from your body. This can happen if you have diarrhea or vomiting. It can also happen if you take a medicine to make you urinate more (diuretic). To increase the amount of potassium in your diet, include these high-potassium foods.     [The (*) indicates foods highest in potassium.]  Vegetables  Artichokes. Cooked 1/2 cup, 200 mg to 300 mg*  Asparagus. Cooked 1/2 cup, 200 mg to 300 mg  Beans. White, red, aparicio cooked 1/2 cup, 300 mg to 500 mg*  Beets. Cooked 1/2 cup, 200 mg to 300 mg  Broccoli. Cooked or raw 1 cup, 200 mg to 500 mg*  [place] sprouts. Cooked 1/2 cup, 200 mg to 300 mg  Cabbage. Raw 1 cup, 100 mg to 200 mg  Carrots. Raw or cooked 1/2 cup, 100 mg to 200 mg  Celery. Raw 1 cup, 200 mg to 300 mg  Lima beans. Fresh or frozen 1/2 cup, 300 mg to 500 mg*   Mushrooms. Raw or cooked 1/2 cup, 100 mg to 300 mg  Peas. Cooked 1/2 cup, 150 mg to 250 mg   Potatoes. Baked 1 medium, 500 mg to 900 mg*   Spinach. Cooked 1 cup, 800 mg to 900 mg*   Spinach. Raw 2 cups, 300 mg to 400 mg *  Squash, winter. Fresh, frozen, or cooked 1/2 cup, 200 mg to 400 mg   Tomato. Fresh 1 medium, 200 mg to 300 mg   Tomato juice. Canned 1/2 cup, 200 mg to 300 mg   Fruits  Apple juice. Unsweetened 1 cup, 200 mg to 300 mg   Apricots. Canned 1/2 cup, 200 mg to 300 mg   Apricots. Dried 4 pieces, 100 mg to 200 mg   Avocado. Raw 1/2 cup, 300 mg to 400 mg*  Banana. Fresh 1 small, 300 mg to 400 mg*   Cantaloupe. Fresh 1 cup diced, 300 mg to 400 mg*   Grape juice. Unsweetened 1 cup, 200 mg to 300 mg   Honeydew melon. Fresh 1 cup diced, 300 mg to 400 mg*   Orange. Fresh 1 medium, 200 mg to 300 mg    Orange juice. Unsweetened, fresh or frozen 1/2 cup, 200 mg to 300 mg  Pineapple juice. Unsweetened 1 cup, 300 mg to 400 mg   Prune juice. Unsweetened 1/2 cup, 300 mg to 400 mg*   Prunes. Dried 5  pieces, 300 mg to 400 mg*   Strawberries. Fresh or frozen 1 cup, 200 mg to 300 mg  Meat  Red meat. Cooked 3 ounces, 100 mg to 300 mg   Seafood  Cod, flounder, halibut. Cooked 3 ounces, 100 mg to 300 mg*  [place]. Cooked, 3 ounces 300 mg to 400 mg*   Scallops. Cooked 3 ounces, 200 mg to 300 mg*  Shrimp. Cooked 3/4 cup, 100 mg to 200 mg   Tuna. Fresh or canned 3/4 cup, 200 mg to 500 mg   Date Last Reviewed: 10/1/2016  © 9911-6229 [employer]. [address] [place] [address], [place] [address]. All rights reserved. This information is not intended as a substitute for professional medical care. Always follow your healthcare professional's instructions.

## 2018-02-10 ENCOUNTER — INFUSION (OUTPATIENT)
Dept: INFUSION THERAPY | Facility: HOSPITAL | Age: 68
End: 2018-02-10
Attending: INTERNAL MEDICINE
Payer: MEDICARE

## 2018-02-10 VITALS
DIASTOLIC BLOOD PRESSURE: 67 MMHG | RESPIRATION RATE: 18 BRPM | HEART RATE: 70 BPM | TEMPERATURE: 98 F | SYSTOLIC BLOOD PRESSURE: 126 MMHG

## 2018-02-10 DIAGNOSIS — C22.1 INTRAHEPATIC CHOLANGIOCARCINOMA: Primary | ICD-10-CM

## 2018-02-10 PROCEDURE — 63600175 PHARM REV CODE 636 W HCPCS: Mod: JG | Performed by: INTERNAL MEDICINE

## 2018-02-10 PROCEDURE — 96372 THER/PROPH/DIAG INJ SC/IM: CPT

## 2018-02-10 RX ADMIN — FILGRASTIM 480 MCG: 480 INJECTION, SOLUTION INTRAVENOUS; SUBCUTANEOUS at 11:02

## 2018-02-10 NOTE — NURSING
1155: patient received neupogen as ordered by Dr Canales. Patient given avs and instructed to call MD if any problems.

## 2018-02-12 ENCOUNTER — INFUSION (OUTPATIENT)
Dept: INFUSION THERAPY | Facility: HOSPITAL | Age: 68
End: 2018-02-12
Attending: INTERNAL MEDICINE
Payer: MEDICARE

## 2018-02-12 DIAGNOSIS — C22.1 INTRAHEPATIC CHOLANGIOCARCINOMA: Primary | ICD-10-CM

## 2018-02-12 PROCEDURE — 96372 THER/PROPH/DIAG INJ SC/IM: CPT

## 2018-02-12 PROCEDURE — 63600175 PHARM REV CODE 636 W HCPCS: Mod: JG | Performed by: INTERNAL MEDICINE

## 2018-02-12 RX ADMIN — FILGRASTIM 480 MCG: 480 INJECTION, SOLUTION INTRAVENOUS; SUBCUTANEOUS at 08:02

## 2018-02-14 ENCOUNTER — INFUSION (OUTPATIENT)
Dept: INFUSION THERAPY | Facility: HOSPITAL | Age: 68
End: 2018-02-14
Attending: INTERNAL MEDICINE
Payer: MEDICARE

## 2018-02-14 DIAGNOSIS — C22.1 INTRAHEPATIC CHOLANGIOCARCINOMA: Primary | ICD-10-CM

## 2018-02-14 PROCEDURE — 63600175 PHARM REV CODE 636 W HCPCS: Mod: JG | Performed by: INTERNAL MEDICINE

## 2018-02-14 PROCEDURE — 96372 THER/PROPH/DIAG INJ SC/IM: CPT

## 2018-02-14 RX ADMIN — FILGRASTIM 480 MCG: 480 INJECTION, SOLUTION INTRAVENOUS; SUBCUTANEOUS at 11:02

## 2018-02-22 ENCOUNTER — LAB VISIT (OUTPATIENT)
Dept: LAB | Facility: HOSPITAL | Age: 68
End: 2018-02-22
Attending: INTERNAL MEDICINE
Payer: MEDICARE

## 2018-02-22 DIAGNOSIS — C22.1 INTRAHEPATIC CHOLANGIOCARCINOMA: ICD-10-CM

## 2018-02-22 LAB
ALBUMIN SERPL BCP-MCNC: 3.8 G/DL
ALP SERPL-CCNC: 88 U/L
ALT SERPL W/O P-5'-P-CCNC: 23 U/L
ANION GAP SERPL CALC-SCNC: 9 MMOL/L
AST SERPL-CCNC: 18 U/L
BILIRUB SERPL-MCNC: 0.9 MG/DL
BUN SERPL-MCNC: 21 MG/DL
CALCIUM SERPL-MCNC: 9.6 MG/DL
CHLORIDE SERPL-SCNC: 108 MMOL/L
CO2 SERPL-SCNC: 27 MMOL/L
CREAT SERPL-MCNC: 1.2 MG/DL
ERYTHROCYTE [DISTWIDTH] IN BLOOD BY AUTOMATED COUNT: 14.5 %
EST. GFR  (AFRICAN AMERICAN): >60 ML/MIN/1.73 M^2
EST. GFR  (NON AFRICAN AMERICAN): >60 ML/MIN/1.73 M^2
GLUCOSE SERPL-MCNC: 109 MG/DL
HCT VFR BLD AUTO: 36 %
HGB BLD-MCNC: 12 G/DL
IMM GRANULOCYTES # BLD AUTO: 0.09 K/UL
MCH RBC QN AUTO: 30.7 PG
MCHC RBC AUTO-ENTMCNC: 33.3 G/DL
MCV RBC AUTO: 92 FL
NEUTROPHILS # BLD AUTO: 4.7 K/UL
PLATELET # BLD AUTO: 320 K/UL
PMV BLD AUTO: 9.3 FL
POTASSIUM SERPL-SCNC: 4.7 MMOL/L
PROT SERPL-MCNC: 7.5 G/DL
RBC # BLD AUTO: 3.91 M/UL
SODIUM SERPL-SCNC: 144 MMOL/L
WBC # BLD AUTO: 6.6 K/UL

## 2018-02-22 PROCEDURE — 80053 COMPREHEN METABOLIC PANEL: CPT

## 2018-02-22 PROCEDURE — 36415 COLL VENOUS BLD VENIPUNCTURE: CPT

## 2018-02-22 PROCEDURE — 85027 COMPLETE CBC AUTOMATED: CPT

## 2018-02-23 ENCOUNTER — OFFICE VISIT (OUTPATIENT)
Dept: HEMATOLOGY/ONCOLOGY | Facility: CLINIC | Age: 68
End: 2018-02-23
Payer: MEDICARE

## 2018-02-23 ENCOUNTER — INFUSION (OUTPATIENT)
Dept: INFUSION THERAPY | Facility: HOSPITAL | Age: 68
End: 2018-02-23
Attending: INTERNAL MEDICINE
Payer: MEDICARE

## 2018-02-23 VITALS
DIASTOLIC BLOOD PRESSURE: 63 MMHG | SYSTOLIC BLOOD PRESSURE: 124 MMHG | TEMPERATURE: 99 F | RESPIRATION RATE: 16 BRPM | HEART RATE: 70 BPM

## 2018-02-23 VITALS
WEIGHT: 187.38 LBS | OXYGEN SATURATION: 96 % | RESPIRATION RATE: 16 BRPM | BODY MASS INDEX: 27.75 KG/M2 | HEART RATE: 70 BPM | DIASTOLIC BLOOD PRESSURE: 80 MMHG | HEIGHT: 69 IN | TEMPERATURE: 98 F | SYSTOLIC BLOOD PRESSURE: 140 MMHG

## 2018-02-23 DIAGNOSIS — C78.00 MALIGNANT NEOPLASM METASTATIC TO LUNG, UNSPECIFIED LATERALITY: ICD-10-CM

## 2018-02-23 DIAGNOSIS — C78.6 SECONDARY ADENOCARCINOMA OF RETROPERITONEUM: ICD-10-CM

## 2018-02-23 DIAGNOSIS — C22.1 INTRAHEPATIC CHOLANGIOCARCINOMA: Primary | ICD-10-CM

## 2018-02-23 DIAGNOSIS — C22.1 CHOLANGIOCARCINOMA: Primary | ICD-10-CM

## 2018-02-23 PROCEDURE — 96367 TX/PROPH/DG ADDL SEQ IV INF: CPT

## 2018-02-23 PROCEDURE — 96361 HYDRATE IV INFUSION ADD-ON: CPT

## 2018-02-23 PROCEDURE — 1159F MED LIST DOCD IN RCRD: CPT | Mod: S$GLB,,, | Performed by: INTERNAL MEDICINE

## 2018-02-23 PROCEDURE — 96417 CHEMO IV INFUS EACH ADDL SEQ: CPT

## 2018-02-23 PROCEDURE — 99999 PR PBB SHADOW E&M-EST. PATIENT-LVL IV: CPT | Mod: PBBFAC,,, | Performed by: INTERNAL MEDICINE

## 2018-02-23 PROCEDURE — 99215 OFFICE O/P EST HI 40 MIN: CPT | Mod: S$GLB,,, | Performed by: INTERNAL MEDICINE

## 2018-02-23 PROCEDURE — 3008F BODY MASS INDEX DOCD: CPT | Mod: S$GLB,,, | Performed by: INTERNAL MEDICINE

## 2018-02-23 PROCEDURE — 1126F AMNT PAIN NOTED NONE PRSNT: CPT | Mod: S$GLB,,, | Performed by: INTERNAL MEDICINE

## 2018-02-23 PROCEDURE — 96413 CHEMO IV INFUSION 1 HR: CPT

## 2018-02-23 PROCEDURE — 63600175 PHARM REV CODE 636 W HCPCS: Mod: JG | Performed by: INTERNAL MEDICINE

## 2018-02-23 PROCEDURE — 25000003 PHARM REV CODE 250: Performed by: INTERNAL MEDICINE

## 2018-02-23 RX ORDER — SODIUM CHLORIDE 0.9 % (FLUSH) 0.9 %
10 SYRINGE (ML) INJECTION
Status: CANCELLED | OUTPATIENT
Start: 2018-02-23

## 2018-02-23 RX ORDER — HEPARIN 100 UNIT/ML
500 SYRINGE INTRAVENOUS
Status: CANCELLED | OUTPATIENT
Start: 2018-02-23

## 2018-02-23 RX ORDER — HEPARIN 100 UNIT/ML
500 SYRINGE INTRAVENOUS
Status: DISCONTINUED | OUTPATIENT
Start: 2018-02-23 | End: 2018-02-23 | Stop reason: HOSPADM

## 2018-02-23 RX ORDER — SODIUM CHLORIDE 0.9 % (FLUSH) 0.9 %
10 SYRINGE (ML) INJECTION
Status: DISCONTINUED | OUTPATIENT
Start: 2018-02-23 | End: 2018-02-23 | Stop reason: HOSPADM

## 2018-02-23 RX ADMIN — SODIUM CHLORIDE 150 MG: 9 INJECTION, SOLUTION INTRAVENOUS at 11:02

## 2018-02-23 RX ADMIN — DEXAMETHASONE SODIUM PHOSPHATE: 4 INJECTION, SOLUTION INTRAMUSCULAR; INTRAVENOUS at 10:02

## 2018-02-23 RX ADMIN — GEMCITABINE 2040 MG: 38 INJECTION INTRAVENOUS at 12:02

## 2018-02-23 RX ADMIN — SODIUM CHLORIDE: 9 INJECTION, SOLUTION INTRAVENOUS at 09:02

## 2018-02-23 RX ADMIN — CISPLATIN 51 MG: 1 INJECTION, SOLUTION INTRAVENOUS at 11:02

## 2018-02-23 RX ADMIN — SODIUM CHLORIDE: 0.9 INJECTION, SOLUTION INTRAVENOUS at 01:02

## 2018-02-23 NOTE — PLAN OF CARE
Problem: Chemotherapy Effects (Adult)  Goal: Signs and Symptoms of Listed Potential Problems Will be Absent, Minimized or Managed (Chemotherapy Effects)  Signs and symptoms of listed potential problems will be absent, minimized or managed by discharge/transition of care (reference Chemotherapy Effects (Adult) CPG).   Outcome: Ongoing (interventions implemented as appropriate)  Pt here for D 1 C 2 OF cisplatin/gemzar/fluids. VSS.  No complaints  voiced.Consent/labs/meds/allergies/treatment reviewed.  Accessed per flowsheet.  All questions asked were answered. Will Continue to monitor

## 2018-02-23 NOTE — PROGRESS NOTES
"Subjective:       Patient ID: Eric Brown is a 67 y.o. male.    Chief Complaint: Intrahepatic cholangiocarcinoma; Pain (bone pain after 3rd shot/took OTC medicine); and Constipation  ONCOLOGIC HISTORY:  Mr. Brown is a 67 year old male who in 6/2012 underwent a distal pancreatectomy for a PNET and a liver resection for a T2N0M0 intrahepatic cholangiocarcinoma.  Unfortunately had a recurrence of the cholangiocarcinoma and underwent a repeat resection at [facility].  Completed adjuvant treatment with cisplatin and gemcitabine - 6 cycles.     He has been on surveillance.     [facility] "Scattered subcentimeter pulmonary nodules, some new/larger than on 07/31/2017, nonspecific, possibly metastatic or inflammatory, to be followed.  Small metabolically active foci in the liver, at sites of previously noted subcentimeter hypervascular nodules on liver protocol CT dated 10/02/2017, concerning for metastases Metabolically active periportal and retroperitoneal adenopathy, larger than on 10/02/2017, consistent with metastatic disease. Spiculated soft tissue in the pancreatectomy bed region, with low-grade metabolic activity, most likely related to postsurgical change, but local recurrence cannot be entirely excluded, to be followed"  He underwent biopsy of soft tissue of peritoneum and pathology revealed metastatic cancer compatible with mets from cholangiocarcinoma. He has been recommended to start chemo with Cisplatin and gemzar and they are also evaluating him for trials.        HPI  He comes in today for cycle 2, day 1 Cisplatin and Gemzar  He did well chemo. He had bone pain with neupogen.        Review of Systems   Constitutional: Negative for appetite change, fatigue and unexpected weight change.   HENT: Negative for mouth sores.    Eyes: Negative for visual disturbance.   Respiratory: Negative for cough and shortness of breath.    Cardiovascular: Negative for chest " pain.   Gastrointestinal: Negative for abdominal pain and diarrhea.   Genitourinary: Negative for frequency.   Musculoskeletal: Negative for back pain.   Skin: Negative for rash.   Neurological: Negative for headaches.   Hematological: Negative for adenopathy.   Psychiatric/Behavioral: The patient is not nervous/anxious.    All other systems reviewed and are negative.        PMFSH: all information reviewed and updated as relevant to today's visit  Objective:      Physical Exam   Constitutional: He is oriented to person, place, and time. He appears well-developed and well-nourished.   HENT:   Mouth/Throat: No oropharyngeal exudate.   Cardiovascular: Normal rate and normal heart sounds.    Pulmonary/Chest: Effort normal and breath sounds normal. He has no wheezes.   Abdominal: Soft. Bowel sounds are normal. There is no tenderness.   Musculoskeletal: He exhibits no edema or tenderness.   Lymphadenopathy:     He has no cervical adenopathy.   Neurological: He is alert and oriented to person, place, and time. Coordination normal.   Skin: Skin is warm and dry. No rash noted.   Psychiatric: He has a normal mood and affect. Judgment and thought content normal.   Vitals reviewed.      LABS:  WBC   Date Value Ref Range Status   02/22/2018 6.60 3.90 - 12.70 K/uL Final     Hemoglobin   Date Value Ref Range Status   02/22/2018 12.0 (L) 14.0 - 18.0 g/dL Final     Hematocrit   Date Value Ref Range Status   02/22/2018 36.0 (L) 40.0 - 54.0 % Final     Platelets   Date Value Ref Range Status   02/22/2018 320 150 - 350 K/uL Final     Gran # (ANC)   Date Value Ref Range Status   02/22/2018 4.7 1.8 - 7.7 K/uL Final     Comment:     The ANC is based on a white cell differential from an   automated cell counter. It has not been microscopically   reviewed for the presence of abnormal cells. Clinical   correlation is required.         Chemistry        Component Value Date/Time     02/22/2018 0921    K 4.7 02/22/2018 0921      02/22/2018 0921    CO2 27 02/22/2018 0921    BUN 21 02/22/2018 0921    CREATININE 1.2 02/22/2018 0921     02/22/2018 0921        Component Value Date/Time    CALCIUM 9.6 02/22/2018 0921    ALKPHOS 88 02/22/2018 0921    AST 18 02/22/2018 0921    ALT 23 02/22/2018 0921    BILITOT 0.9 02/22/2018 0921    ESTGFRAFRICA >60.0 02/22/2018 0921    EGFRNONAA >60.0 02/22/2018 0921          Assessment:       1. Cholangiocarcinoma    2. Malignant neoplasm metastatic to lung, unspecified laterality    3. Secondary adenocarcinoma of retroperitoneum        Plan:        1,2,3. He is doing well and will proceed with cycle 2, day 1 of Cisplatin and Gemzar and will return in 1 week for day 8. neupgen X 2 days    Above care plan was discussed with patient and accompanying wife and all questions were addressed to their satisfaction

## 2018-02-24 ENCOUNTER — INFUSION (OUTPATIENT)
Dept: INFUSION THERAPY | Facility: HOSPITAL | Age: 68
End: 2018-02-24
Attending: INTERNAL MEDICINE
Payer: MEDICARE

## 2018-02-24 DIAGNOSIS — C22.1 INTRAHEPATIC CHOLANGIOCARCINOMA: Primary | ICD-10-CM

## 2018-02-24 PROCEDURE — 96372 THER/PROPH/DIAG INJ SC/IM: CPT

## 2018-02-24 PROCEDURE — 63600175 PHARM REV CODE 636 W HCPCS: Mod: JG | Performed by: INTERNAL MEDICINE

## 2018-02-24 RX ADMIN — FILGRASTIM 480 MCG: 480 INJECTION, SOLUTION INTRAVENOUS; SUBCUTANEOUS at 10:02

## 2018-02-24 NOTE — NURSING
Patient here for Neupogen injection.  Assessment complete and labs reviewed.  Administered injection to left arm.  No questions or concerns.  AVS given to patient.  Patient ambulated off unit unassisted.

## 2018-02-26 ENCOUNTER — INFUSION (OUTPATIENT)
Dept: INFUSION THERAPY | Facility: HOSPITAL | Age: 68
End: 2018-02-26
Attending: INTERNAL MEDICINE
Payer: MEDICARE

## 2018-02-26 DIAGNOSIS — C22.1 INTRAHEPATIC CHOLANGIOCARCINOMA: Primary | ICD-10-CM

## 2018-02-26 PROCEDURE — 63600175 PHARM REV CODE 636 W HCPCS: Mod: JG | Performed by: INTERNAL MEDICINE

## 2018-02-26 PROCEDURE — 96372 THER/PROPH/DIAG INJ SC/IM: CPT

## 2018-02-26 RX ADMIN — FILGRASTIM 480 MCG: 480 INJECTION, SOLUTION INTRAVENOUS; SUBCUTANEOUS at 11:02

## 2018-02-28 DIAGNOSIS — N13.8 BENIGN PROSTATIC HYPERPLASIA WITH URINARY OBSTRUCTION: ICD-10-CM

## 2018-02-28 DIAGNOSIS — N40.1 BENIGN PROSTATIC HYPERPLASIA WITH URINARY OBSTRUCTION: ICD-10-CM

## 2018-02-28 RX ORDER — TAMSULOSIN HYDROCHLORIDE 0.4 MG/1
0.4 CAPSULE ORAL DAILY
Qty: 90 CAPSULE | Refills: 3 | Status: SHIPPED | OUTPATIENT
Start: 2018-02-28 | End: 2018-12-21 | Stop reason: SDUPTHER

## 2018-02-28 RX ORDER — OMEPRAZOLE 20 MG/1
20 CAPSULE, DELAYED RELEASE ORAL DAILY
Qty: 90 CAPSULE | Refills: 3 | Status: SHIPPED | OUTPATIENT
Start: 2018-02-28 | End: 2018-03-30

## 2018-03-01 ENCOUNTER — LAB VISIT (OUTPATIENT)
Dept: LAB | Facility: HOSPITAL | Age: 68
End: 2018-03-01
Attending: INTERNAL MEDICINE
Payer: MEDICARE

## 2018-03-01 ENCOUNTER — DOCUMENTATION ONLY (OUTPATIENT)
Dept: HEMATOLOGY/ONCOLOGY | Facility: CLINIC | Age: 68
End: 2018-03-01

## 2018-03-01 DIAGNOSIS — C22.1 INTRAHEPATIC CHOLANGIOCARCINOMA: ICD-10-CM

## 2018-03-01 LAB
ALBUMIN SERPL BCP-MCNC: 3.7 G/DL
ALP SERPL-CCNC: 109 U/L
ALT SERPL W/O P-5'-P-CCNC: 20 U/L
ANION GAP SERPL CALC-SCNC: 10 MMOL/L
AST SERPL-CCNC: 15 U/L
BILIRUB SERPL-MCNC: 0.4 MG/DL
BUN SERPL-MCNC: 15 MG/DL
CALCIUM SERPL-MCNC: 10.3 MG/DL
CHLORIDE SERPL-SCNC: 105 MMOL/L
CO2 SERPL-SCNC: 27 MMOL/L
CREAT SERPL-MCNC: 1.1 MG/DL
ERYTHROCYTE [DISTWIDTH] IN BLOOD BY AUTOMATED COUNT: 15 %
EST. GFR  (AFRICAN AMERICAN): >60 ML/MIN/1.73 M^2
EST. GFR  (NON AFRICAN AMERICAN): >60 ML/MIN/1.73 M^2
GLUCOSE SERPL-MCNC: 103 MG/DL
HCT VFR BLD AUTO: 34.7 %
HGB BLD-MCNC: 11.7 G/DL
IMM GRANULOCYTES # BLD AUTO: 1.05 K/UL
MCH RBC QN AUTO: 31.3 PG
MCHC RBC AUTO-ENTMCNC: 33.7 G/DL
MCV RBC AUTO: 93 FL
NEUTROPHILS # BLD AUTO: 13.1 K/UL
PLATELET # BLD AUTO: 599 K/UL
PMV BLD AUTO: 9.1 FL
POTASSIUM SERPL-SCNC: 5 MMOL/L
PROT SERPL-MCNC: 7.3 G/DL
RBC # BLD AUTO: 3.74 M/UL
SODIUM SERPL-SCNC: 142 MMOL/L
WBC # BLD AUTO: 17.11 K/UL

## 2018-03-01 PROCEDURE — 80053 COMPREHEN METABOLIC PANEL: CPT

## 2018-03-01 PROCEDURE — 36415 COLL VENOUS BLD VENIPUNCTURE: CPT

## 2018-03-01 PROCEDURE — 85027 COMPLETE CBC AUTOMATED: CPT

## 2018-03-02 ENCOUNTER — OFFICE VISIT (OUTPATIENT)
Dept: HEMATOLOGY/ONCOLOGY | Facility: CLINIC | Age: 68
End: 2018-03-02
Payer: MEDICARE

## 2018-03-02 ENCOUNTER — INFUSION (OUTPATIENT)
Dept: INFUSION THERAPY | Facility: HOSPITAL | Age: 68
End: 2018-03-02
Attending: INTERNAL MEDICINE
Payer: MEDICARE

## 2018-03-02 VITALS
TEMPERATURE: 98 F | TEMPERATURE: 98 F | HEART RATE: 70 BPM | HEIGHT: 69 IN | HEIGHT: 69 IN | BODY MASS INDEX: 27.2 KG/M2 | HEART RATE: 71 BPM | WEIGHT: 183.63 LBS | WEIGHT: 183.63 LBS | BODY MASS INDEX: 27.2 KG/M2 | SYSTOLIC BLOOD PRESSURE: 124 MMHG | DIASTOLIC BLOOD PRESSURE: 78 MMHG | SYSTOLIC BLOOD PRESSURE: 144 MMHG | OXYGEN SATURATION: 96 % | DIASTOLIC BLOOD PRESSURE: 69 MMHG | RESPIRATION RATE: 18 BRPM | RESPIRATION RATE: 18 BRPM

## 2018-03-02 DIAGNOSIS — C22.1 INTRAHEPATIC CHOLANGIOCARCINOMA: Primary | ICD-10-CM

## 2018-03-02 DIAGNOSIS — C22.1 CHOLANGIOCARCINOMA: Primary | ICD-10-CM

## 2018-03-02 DIAGNOSIS — C78.00 MALIGNANT NEOPLASM METASTATIC TO LUNG, UNSPECIFIED LATERALITY: ICD-10-CM

## 2018-03-02 DIAGNOSIS — C78.6 SECONDARY ADENOCARCINOMA OF RETROPERITONEUM: ICD-10-CM

## 2018-03-02 PROCEDURE — 25000003 PHARM REV CODE 250: Performed by: INTERNAL MEDICINE

## 2018-03-02 PROCEDURE — 96417 CHEMO IV INFUS EACH ADDL SEQ: CPT

## 2018-03-02 PROCEDURE — 63600175 PHARM REV CODE 636 W HCPCS: Performed by: INTERNAL MEDICINE

## 2018-03-02 PROCEDURE — 99215 OFFICE O/P EST HI 40 MIN: CPT | Mod: S$GLB,,, | Performed by: INTERNAL MEDICINE

## 2018-03-02 PROCEDURE — 96367 TX/PROPH/DG ADDL SEQ IV INF: CPT

## 2018-03-02 PROCEDURE — 99999 PR PBB SHADOW E&M-EST. PATIENT-LVL IV: CPT | Mod: PBBFAC,,, | Performed by: INTERNAL MEDICINE

## 2018-03-02 PROCEDURE — 96413 CHEMO IV INFUSION 1 HR: CPT

## 2018-03-02 PROCEDURE — 96361 HYDRATE IV INFUSION ADD-ON: CPT

## 2018-03-02 RX ORDER — SODIUM CHLORIDE 0.9 % (FLUSH) 0.9 %
10 SYRINGE (ML) INJECTION
Status: CANCELLED | OUTPATIENT
Start: 2018-03-02

## 2018-03-02 RX ORDER — SODIUM CHLORIDE 0.9 % (FLUSH) 0.9 %
10 SYRINGE (ML) INJECTION
Status: DISCONTINUED | OUTPATIENT
Start: 2018-03-02 | End: 2018-03-02 | Stop reason: HOSPADM

## 2018-03-02 RX ORDER — HEPARIN 100 UNIT/ML
500 SYRINGE INTRAVENOUS
Status: CANCELLED | OUTPATIENT
Start: 2018-03-02

## 2018-03-02 RX ADMIN — SODIUM CHLORIDE 150 MG: 9 INJECTION, SOLUTION INTRAVENOUS at 09:03

## 2018-03-02 RX ADMIN — GEMCITABINE HYDROCHLORIDE 2040 MG: 1 INJECTION, POWDER, LYOPHILIZED, FOR SOLUTION INTRAVENOUS at 10:03

## 2018-03-02 RX ADMIN — DEXAMETHASONE SODIUM PHOSPHATE: 4 INJECTION, SOLUTION INTRAMUSCULAR; INTRAVENOUS at 09:03

## 2018-03-02 RX ADMIN — SODIUM CHLORIDE 51 MG: 9 INJECTION, SOLUTION INTRAVENOUS at 10:03

## 2018-03-02 RX ADMIN — SODIUM CHLORIDE: 0.9 INJECTION, SOLUTION INTRAVENOUS at 08:03

## 2018-03-02 RX ADMIN — SODIUM CHLORIDE: 9 INJECTION, SOLUTION INTRAVENOUS at 11:03

## 2018-03-02 NOTE — PROGRESS NOTES
"Subjective:       Patient ID: Eric Brown is a 67 y.o. male.    Chief Complaint: Cholangiocarcinoma  ONCOLOGIC HISTORY:  Mr. Brown is a 67 year old male who in 6/2012 underwent a distal pancreatectomy for a PNET and a liver resection for a T2N0M0 intrahepatic cholangiocarcinoma.  Unfortunately had a recurrence of the cholangiocarcinoma and underwent a repeat resection at [facility].  Completed adjuvant treatment with cisplatin and gemcitabine - 6 cycles.     He has been on surveillance.     [facility] "Scattered subcentimeter pulmonary nodules, some new/larger than on 07/31/2017, nonspecific, possibly metastatic or inflammatory, to be followed.  Small metabolically active foci in the liver, at sites of previously noted subcentimeter hypervascular nodules on liver protocol CT dated 10/02/2017, concerning for metastases Metabolically active periportal and retroperitoneal adenopathy, larger than on 10/02/2017, consistent with metastatic disease. Spiculated soft tissue in the pancreatectomy bed region, with low-grade metabolic activity, most likely related to postsurgical change, but local recurrence cannot be entirely excluded, to be followed"  He underwent biopsy of soft tissue of peritoneum and pathology revealed metastatic cancer compatible with mets from cholangiocarcinoma. He has been recommended to start chemo with Cisplatin and gemzar and they are also evaluating him for trials.         HPI He comes in today for cycle 2, day 8 Cisplatin and Gemzar. He notes fatigue. He had headache and bone pain with neupogen and took claritin which helped  He denies any nausea, vomiting, diarrhea, constipation, abdominal pain, weight loss or loss of appetite, chest pain, shortness of breath, leg swelling, pain, headache, dizziness, or mood changes. His ECOG PS is zero. He is accompanied by his wife.           Review of Systems   Constitutional: Negative for appetite change, " fatigue and unexpected weight change.   HENT: Negative for mouth sores.    Eyes: Negative for visual disturbance.   Respiratory: Negative for cough and shortness of breath.    Cardiovascular: Negative for chest pain.   Gastrointestinal: Negative for abdominal pain and diarrhea.   Genitourinary: Negative for frequency.   Musculoskeletal: Negative for back pain.   Skin: Negative for rash.   Neurological: Negative for headaches.   Hematological: Negative for adenopathy.   Psychiatric/Behavioral: The patient is not nervous/anxious.    All other systems reviewed and are negative.      PMFSH: all information reviewed and updated as relevant to today's visit  Objective:      Physical Exam   Constitutional: He is oriented to person, place, and time. He appears well-developed and well-nourished.   HENT:   Mouth/Throat: No oropharyngeal exudate.   Cardiovascular: Normal rate and normal heart sounds.    Pulmonary/Chest: Effort normal and breath sounds normal. He has no wheezes.   Abdominal: Soft. Bowel sounds are normal. There is no tenderness.   Musculoskeletal: He exhibits no edema or tenderness.   Lymphadenopathy:     He has no cervical adenopathy.   Neurological: He is alert and oriented to person, place, and time. Coordination normal.   Skin: Skin is warm and dry. No rash noted.   Psychiatric: He has a normal mood and affect. Judgment and thought content normal.   Vitals reviewed.        LABS:  WBC   Date Value Ref Range Status   03/01/2018 17.11 (H) 3.90 - 12.70 K/uL Final     Hemoglobin   Date Value Ref Range Status   03/01/2018 11.7 (L) 14.0 - 18.0 g/dL Final     Hematocrit   Date Value Ref Range Status   03/01/2018 34.7 (L) 40.0 - 54.0 % Final     Platelets   Date Value Ref Range Status   03/01/2018 599 (H) 150 - 350 K/uL Final     Gran # (ANC)   Date Value Ref Range Status   03/01/2018 13.1 (H) 1.8 - 7.7 K/uL Final     Comment:     The ANC is based on a white cell differential from an   automated cell counter. It has  not been microscopically   reviewed for the presence of abnormal cells. Clinical   correlation is required.         Chemistry        Component Value Date/Time     03/01/2018 1000    K 5.0 03/01/2018 1000     03/01/2018 1000    CO2 27 03/01/2018 1000    BUN 15 03/01/2018 1000    CREATININE 1.1 03/01/2018 1000     03/01/2018 1000        Component Value Date/Time    CALCIUM 10.3 03/01/2018 1000    ALKPHOS 109 03/01/2018 1000    AST 15 03/01/2018 1000    ALT 20 03/01/2018 1000    BILITOT 0.4 03/01/2018 1000    ESTGFRAFRICA >60.0 03/01/2018 1000    EGFRNONAA >60.0 03/01/2018 1000          Assessment:       1. Cholangiocarcinoma    2. Malignant neoplasm metastatic to lung, unspecified laterality    3. Secondary adenocarcinoma of retroperitoneum        Plan:        1,2,3. He is doing well clinically and will proceed with cycle 2, day 8 of chemo with Cisplatin and Gemzar and will return in 2 weeks for cycle 3.  Hold Neulasta this time as WBC is francesco    Above care plan was discussed with patient and accompanying wife and all questions were addressed to their satisfaction

## 2018-03-02 NOTE — Clinical Note
Schedule CBC on 3/8/18 AM  Schedule CBC,cMp on 3/15, 3/22 Schedule to see me and for Cisplatin and Gemzar on 3/16 and 3/23 Neupogen on 3/17, 3/18 Neulasta on 3/24

## 2018-03-02 NOTE — PLAN OF CARE
Problem: Patient Care Overview  Goal: Plan of Care Review  Outcome: Ongoing (interventions implemented as appropriate)  Patient tolerated gemzar/cisplatin well today. Plan to hold obi today d/t elevated wbc count. NAD noted upon discharge. PIV removed, catheter tip intact. Verbalized understanding to call MD for any questions/concerns. AVS given. Discharged home, ambulated independently.

## 2018-03-08 ENCOUNTER — TELEPHONE (OUTPATIENT)
Dept: HEMATOLOGY/ONCOLOGY | Facility: CLINIC | Age: 68
End: 2018-03-08

## 2018-03-08 NOTE — TELEPHONE ENCOUNTER
----- Message from Ángel Tripp sent at 3/8/2018  9:03 AM CST -----  Contact: Pt   Will like a call from office regarding blood sample results, in order for him to know if he needs to receive his shots     Contact::[phone]

## 2018-03-16 ENCOUNTER — INFUSION (OUTPATIENT)
Dept: INFUSION THERAPY | Facility: HOSPITAL | Age: 68
End: 2018-03-16
Attending: INTERNAL MEDICINE
Payer: MEDICARE

## 2018-03-16 ENCOUNTER — OFFICE VISIT (OUTPATIENT)
Dept: HEMATOLOGY/ONCOLOGY | Facility: CLINIC | Age: 68
End: 2018-03-16
Payer: MEDICARE

## 2018-03-16 VITALS
RESPIRATION RATE: 18 BRPM | OXYGEN SATURATION: 98 % | HEART RATE: 70 BPM | DIASTOLIC BLOOD PRESSURE: 81 MMHG | SYSTOLIC BLOOD PRESSURE: 149 MMHG | TEMPERATURE: 98 F | DIASTOLIC BLOOD PRESSURE: 82 MMHG | WEIGHT: 187.63 LBS | BODY MASS INDEX: 27.79 KG/M2 | SYSTOLIC BLOOD PRESSURE: 142 MMHG | TEMPERATURE: 98 F | HEART RATE: 68 BPM | RESPIRATION RATE: 16 BRPM | HEIGHT: 69 IN

## 2018-03-16 DIAGNOSIS — Z09 CHEMOTHERAPY FOLLOW-UP EXAMINATION: ICD-10-CM

## 2018-03-16 DIAGNOSIS — C78.6 SECONDARY ADENOCARCINOMA OF RETROPERITONEUM: ICD-10-CM

## 2018-03-16 DIAGNOSIS — C22.1 INTRAHEPATIC CHOLANGIOCARCINOMA: Primary | ICD-10-CM

## 2018-03-16 DIAGNOSIS — C78.00 MALIGNANT NEOPLASM METASTATIC TO LUNG, UNSPECIFIED LATERALITY: ICD-10-CM

## 2018-03-16 DIAGNOSIS — T45.1X5A CHEMOTHERAPY INDUCED NEUTROPENIA: ICD-10-CM

## 2018-03-16 DIAGNOSIS — D70.1 CHEMOTHERAPY INDUCED NEUTROPENIA: ICD-10-CM

## 2018-03-16 PROCEDURE — 25000003 PHARM REV CODE 250: Performed by: INTERNAL MEDICINE

## 2018-03-16 PROCEDURE — 96413 CHEMO IV INFUSION 1 HR: CPT

## 2018-03-16 PROCEDURE — 96361 HYDRATE IV INFUSION ADD-ON: CPT

## 2018-03-16 PROCEDURE — 99999 PR PBB SHADOW E&M-EST. PATIENT-LVL IV: CPT | Mod: PBBFAC,,, | Performed by: INTERNAL MEDICINE

## 2018-03-16 PROCEDURE — 99215 OFFICE O/P EST HI 40 MIN: CPT | Mod: S$GLB,,, | Performed by: INTERNAL MEDICINE

## 2018-03-16 PROCEDURE — 63600175 PHARM REV CODE 636 W HCPCS: Performed by: INTERNAL MEDICINE

## 2018-03-16 PROCEDURE — 96417 CHEMO IV INFUS EACH ADDL SEQ: CPT

## 2018-03-16 PROCEDURE — 96367 TX/PROPH/DG ADDL SEQ IV INF: CPT

## 2018-03-16 RX ORDER — HEPARIN 100 UNIT/ML
500 SYRINGE INTRAVENOUS
Status: CANCELLED | OUTPATIENT
Start: 2018-03-16

## 2018-03-16 RX ORDER — PROMETHAZINE HYDROCHLORIDE 25 MG/1
25 TABLET ORAL EVERY 6 HOURS PRN
Refills: 7 | COMMUNITY
Start: 2018-03-01 | End: 2018-08-13 | Stop reason: ALTCHOICE

## 2018-03-16 RX ORDER — SODIUM CHLORIDE 0.9 % (FLUSH) 0.9 %
10 SYRINGE (ML) INJECTION
Status: CANCELLED | OUTPATIENT
Start: 2018-03-16

## 2018-03-16 RX ORDER — SODIUM CHLORIDE 0.9 % (FLUSH) 0.9 %
10 SYRINGE (ML) INJECTION
Status: DISCONTINUED | OUTPATIENT
Start: 2018-03-16 | End: 2018-03-16 | Stop reason: HOSPADM

## 2018-03-16 RX ORDER — HEPARIN 100 UNIT/ML
500 SYRINGE INTRAVENOUS
Status: DISCONTINUED | OUTPATIENT
Start: 2018-03-16 | End: 2018-03-16 | Stop reason: HOSPADM

## 2018-03-16 RX ADMIN — GEMCITABINE HYDROCHLORIDE 2040 MG: 1 INJECTION, POWDER, LYOPHILIZED, FOR SOLUTION INTRAVENOUS at 10:03

## 2018-03-16 RX ADMIN — SODIUM CHLORIDE: 0.9 INJECTION, SOLUTION INTRAVENOUS at 12:03

## 2018-03-16 RX ADMIN — CISPLATIN 51 MG: 1 INJECTION, SOLUTION INTRAVENOUS at 11:03

## 2018-03-16 RX ADMIN — SODIUM CHLORIDE: 0.9 INJECTION, SOLUTION INTRAVENOUS at 09:03

## 2018-03-16 RX ADMIN — DEXAMETHASONE SODIUM PHOSPHATE: 4 INJECTION, SOLUTION INTRAMUSCULAR; INTRAVENOUS at 10:03

## 2018-03-16 RX ADMIN — SODIUM CHLORIDE 150 MG: 9 INJECTION, SOLUTION INTRAVENOUS at 10:03

## 2018-03-16 NOTE — PROGRESS NOTES
"Subjective:       Patient ID: Eric Brown is a 67 y.o. male.    Chief Complaint: Cholangiocarcinoma and Results  ONCOLOGIC HISTORY:  Mr. Brown is a 67 year old male who in 6/2012 underwent a distal pancreatectomy for a PNET and a liver resection for a T2N0M0 intrahepatic cholangiocarcinoma.  Unfortunately had a recurrence of the cholangiocarcinoma and underwent a repeat resection at [facility].  Completed adjuvant treatment with cisplatin and gemcitabine - 6 cycles.     He has been on surveillance.     He underwent PET scan on 1/3/18 at [facility] which reveals "Scattered subcentimeter pulmonary nodules, some new/larger than on 07/31/2017, nonspecific, possibly metastatic or inflammatory, to be followed.  Small metabolically active foci in the liver, at sites of previously noted subcentimeter hypervascular nodules on liver protocol CT dated 10/02/2017, concerning for metastases Metabolically active periportal and retroperitoneal adenopathy, larger than on 10/02/2017, consistent with metastatic disease. Spiculated soft tissue in the pancreatectomy bed region, with low-grade metabolic activity, most likely related to postsurgical change, but local recurrence cannot be entirely excluded, to be followed"  He underwent biopsy of soft tissue of peritoneum and pathology revealed metastatic cancer compatible with mets from cholangiocarcinoma. He has been recommended to start chemo with Cisplatin and gemzar and they are also evaluating him for trials.          HPI He comes in today for cycle 3, day 1 Cisplatin and Gemzar    He denies any nausea, vomiting, diarrhea, constipation, abdominal pain, weight loss or loss of appetite, chest pain, shortness of breath, leg swelling, fatigue, pain, headache, dizziness, or mood changes. His ECOG PS is zero. He is accompanied by his wife.          Review of Systems   Constitutional: Negative for appetite change, fatigue and unexpected weight change.   HENT: Negative for mouth " sores.    Eyes: Negative for visual disturbance.   Respiratory: Negative for cough and shortness of breath.    Cardiovascular: Negative for chest pain.   Gastrointestinal: Negative for abdominal pain and diarrhea.   Genitourinary: Negative for frequency.   Musculoskeletal: Negative for back pain.   Skin: Negative for rash.   Neurological: Negative for headaches.   Hematological: Negative for adenopathy.   Psychiatric/Behavioral: The patient is not nervous/anxious.    All other systems reviewed and are negative.      PMFSH: all information reviewed and updated as relevant to today's visit  Objective:      Physical Exam   Constitutional: He is oriented to person, place, and time. He appears well-developed and well-nourished.   HENT:   Mouth/Throat: No oropharyngeal exudate.   Cardiovascular: Normal rate and normal heart sounds.    Pulmonary/Chest: Effort normal and breath sounds normal. He has no wheezes.   Abdominal: Soft. Bowel sounds are normal. There is no tenderness.   Musculoskeletal: He exhibits no edema or tenderness.   Lymphadenopathy:     He has no cervical adenopathy.   Neurological: He is alert and oriented to person, place, and time. Coordination normal.   Skin: Skin is warm and dry. No rash noted.   Psychiatric: He has a normal mood and affect. Judgment and thought content normal.   Vitals reviewed.       LABS:  WBC   Date Value Ref Range Status   03/15/2018 7.13 3.90 - 12.70 K/uL Final     Hemoglobin   Date Value Ref Range Status   03/15/2018 11.5 (L) 14.0 - 18.0 g/dL Final     Hematocrit   Date Value Ref Range Status   03/15/2018 35.1 (L) 40.0 - 54.0 % Final     Platelets   Date Value Ref Range Status   03/15/2018 233 150 - 350 K/uL Final     Gran # (ANC)   Date Value Ref Range Status   03/15/2018 5.1 1.8 - 7.7 K/uL Final     Comment:     The ANC is based on a white cell differential from an   automated cell counter. It has not been microscopically   reviewed for the presence of abnormal cells.  Clinical   correlation is required.         Chemistry        Component Value Date/Time     03/15/2018 0857    K 4.9 03/15/2018 0857     03/15/2018 0857    CO2 28 03/15/2018 0857    BUN 19 03/15/2018 0857    CREATININE 1.1 03/15/2018 0857     (H) 03/15/2018 0857        Component Value Date/Time    CALCIUM 9.6 03/15/2018 0857    ALKPHOS 79 03/15/2018 0857    AST 15 03/15/2018 0857    ALT 19 03/15/2018 0857    BILITOT 0.8 03/15/2018 0857    ESTGFRAFRICA >60.0 03/15/2018 0857    EGFRNONAA >60.0 03/15/2018 0857          Assessment:       1. Intrahepatic cholangiocarcinoma    2. Secondary adenocarcinoma of retroperitoneum    3. Malignant neoplasm metastatic to lung, unspecified laterality    4. Chemotherapy follow-up examination    5. Chemotherapy induced neutropenia        Plan:        1,2,3,4,5. He will proceed with cycle 3, day 1 of Cisplatin and Gemzar. neupogen X 1 day.   He will return in 1 weeks for next dose.    Above care plan was discussed with patient and accompanying wife and all questions were addressed to their satisfaction

## 2018-03-16 NOTE — PLAN OF CARE
Problem: Patient Care Overview  Goal: Plan of Care Review  Outcome: Ongoing (interventions implemented as appropriate)  Tolerated treatment well.  Advised to call MD for any problems or concerns.  AVS given.  RTC as scheduled.  NAD noted upon d/c with VSS.

## 2018-03-17 ENCOUNTER — INFUSION (OUTPATIENT)
Dept: INFUSION THERAPY | Facility: HOSPITAL | Age: 68
End: 2018-03-17
Attending: INTERNAL MEDICINE
Payer: MEDICARE

## 2018-03-17 DIAGNOSIS — C22.1 INTRAHEPATIC CHOLANGIOCARCINOMA: Primary | ICD-10-CM

## 2018-03-17 PROCEDURE — 96372 THER/PROPH/DIAG INJ SC/IM: CPT

## 2018-03-17 PROCEDURE — 63600175 PHARM REV CODE 636 W HCPCS: Mod: JG | Performed by: INTERNAL MEDICINE

## 2018-03-17 RX ADMIN — FILGRASTIM 480 MCG: 480 INJECTION, SOLUTION INTRAVENOUS; SUBCUTANEOUS at 09:03

## 2018-03-17 NOTE — NURSING
Pt. Received injection in back of R. Arm. Tolerated well. Discussed side effects and management. No questions at this time.

## 2018-03-23 ENCOUNTER — OFFICE VISIT (OUTPATIENT)
Dept: HEMATOLOGY/ONCOLOGY | Facility: CLINIC | Age: 68
End: 2018-03-23
Payer: MEDICARE

## 2018-03-23 ENCOUNTER — INFUSION (OUTPATIENT)
Dept: INFUSION THERAPY | Facility: HOSPITAL | Age: 68
End: 2018-03-23
Attending: INTERNAL MEDICINE
Payer: MEDICARE

## 2018-03-23 VITALS
TEMPERATURE: 98 F | DIASTOLIC BLOOD PRESSURE: 77 MMHG | BODY MASS INDEX: 27.62 KG/M2 | HEART RATE: 68 BPM | OXYGEN SATURATION: 95 % | WEIGHT: 186.5 LBS | SYSTOLIC BLOOD PRESSURE: 153 MMHG | HEIGHT: 69 IN | RESPIRATION RATE: 18 BRPM

## 2018-03-23 VITALS
TEMPERATURE: 98 F | HEART RATE: 68 BPM | DIASTOLIC BLOOD PRESSURE: 88 MMHG | SYSTOLIC BLOOD PRESSURE: 149 MMHG | RESPIRATION RATE: 18 BRPM

## 2018-03-23 DIAGNOSIS — C78.00 MALIGNANT NEOPLASM METASTATIC TO LUNG, UNSPECIFIED LATERALITY: ICD-10-CM

## 2018-03-23 DIAGNOSIS — C78.6 SECONDARY ADENOCARCINOMA OF RETROPERITONEUM: ICD-10-CM

## 2018-03-23 DIAGNOSIS — C22.1 INTRAHEPATIC CHOLANGIOCARCINOMA: Primary | ICD-10-CM

## 2018-03-23 PROCEDURE — 99215 OFFICE O/P EST HI 40 MIN: CPT | Mod: S$GLB,,, | Performed by: INTERNAL MEDICINE

## 2018-03-23 PROCEDURE — 63600175 PHARM REV CODE 636 W HCPCS: Performed by: INTERNAL MEDICINE

## 2018-03-23 PROCEDURE — 25000003 PHARM REV CODE 250: Performed by: INTERNAL MEDICINE

## 2018-03-23 PROCEDURE — 99999 PR PBB SHADOW E&M-EST. PATIENT-LVL IV: CPT | Mod: PBBFAC,,, | Performed by: INTERNAL MEDICINE

## 2018-03-23 PROCEDURE — 96367 TX/PROPH/DG ADDL SEQ IV INF: CPT

## 2018-03-23 PROCEDURE — 96417 CHEMO IV INFUS EACH ADDL SEQ: CPT

## 2018-03-23 PROCEDURE — 96361 HYDRATE IV INFUSION ADD-ON: CPT

## 2018-03-23 PROCEDURE — 96413 CHEMO IV INFUSION 1 HR: CPT

## 2018-03-23 RX ORDER — SODIUM CHLORIDE 0.9 % (FLUSH) 0.9 %
10 SYRINGE (ML) INJECTION
Status: DISCONTINUED | OUTPATIENT
Start: 2018-03-23 | End: 2018-03-23 | Stop reason: HOSPADM

## 2018-03-23 RX ORDER — HEPARIN 100 UNIT/ML
500 SYRINGE INTRAVENOUS
Status: CANCELLED | OUTPATIENT
Start: 2018-03-23

## 2018-03-23 RX ORDER — HEPARIN 100 UNIT/ML
500 SYRINGE INTRAVENOUS
Status: DISCONTINUED | OUTPATIENT
Start: 2018-03-23 | End: 2018-03-23 | Stop reason: HOSPADM

## 2018-03-23 RX ORDER — SODIUM CHLORIDE 0.9 % (FLUSH) 0.9 %
10 SYRINGE (ML) INJECTION
Status: CANCELLED | OUTPATIENT
Start: 2018-03-23

## 2018-03-23 RX ADMIN — GEMCITABINE HYDROCHLORIDE 2040 MG: 1 INJECTION, POWDER, LYOPHILIZED, FOR SOLUTION INTRAVENOUS at 10:03

## 2018-03-23 RX ADMIN — SODIUM CHLORIDE: 9 INJECTION, SOLUTION INTRAVENOUS at 09:03

## 2018-03-23 RX ADMIN — SODIUM CHLORIDE: 9 INJECTION, SOLUTION INTRAVENOUS at 11:03

## 2018-03-23 RX ADMIN — CISPLATIN 51 MG: 1 INJECTION, SOLUTION INTRAVENOUS at 10:03

## 2018-03-23 RX ADMIN — SODIUM CHLORIDE 150 MG: 9 INJECTION, SOLUTION INTRAVENOUS at 09:03

## 2018-03-23 RX ADMIN — DEXAMETHASONE SODIUM PHOSPHATE: 4 INJECTION, SOLUTION INTRAMUSCULAR; INTRAVENOUS at 09:03

## 2018-03-23 NOTE — PLAN OF CARE
Problem: Patient Care Overview  Goal: Plan of Care Review  Outcome: Ongoing (interventions implemented as appropriate)  Tolerated treatment well.  Advised to call MD for any problems or concerns.  AVS given.  RTC in 1 week for next infusion.  Will receive Neupogen injection tomorrow. NAD noted upon discharge.

## 2018-03-23 NOTE — PROGRESS NOTES
"Subjective:       Patient ID: Eric Brown is a 67 y.o. male.    Chief Complaint: Intrahepatic cholangiocarcinoma and Tinnitus  ONCOLOGIC HISTORY:  Mr. Brown is a 67 year old male who in 6/2012 underwent a distal pancreatectomy for a PNET and a liver resection for a T2N0M0 intrahepatic cholangiocarcinoma.  Unfortunately had a recurrence of the cholangiocarcinoma and underwent a repeat resection at [facility].  Completed adjuvant treatment with cisplatin and gemcitabine - 6 cycles.     He has been on surveillance.     He underwent PET scan on 1/3/18 at [facility] which reveals "Scattered subcentimeter pulmonary nodules, some new/larger than on 07/31/2017, nonspecific, possibly metastatic or inflammatory, to be followed.  Small metabolically active foci in the liver, at sites of previously noted subcentimeter hypervascular nodules on liver protocol CT dated 10/02/2017, concerning for metastases Metabolically active periportal and retroperitoneal adenopathy, larger than on 10/02/2017, consistent with metastatic disease. Spiculated soft tissue in the pancreatectomy bed region, with low-grade metabolic activity, most likely related to postsurgical change, but local recurrence cannot be entirely excluded, to be followed"  He underwent biopsy of soft tissue of peritoneum and pathology revealed metastatic cancer compatible with mets from cholangiocarcinoma. He has been recommended to start chemo with Cisplatin and gemzar and they are also evaluating him for trials.             HPI He comes in today for cycle 3, day 8 Cisplatin and Gemzar. He notes tinnitus only in the morning when he wakes up and then resolves  He denies any nausea, vomiting, diarrhea, constipation, abdominal pain, weight loss or loss of appetite, chest pain, shortness of breath, leg swelling, fatigue, pain, headache, dizziness, or mood changes. His ECOG PS is zero. He is accompanied by his wife.  He is scheduled to go to [facility] on " 4/2/18          Review of Systems   Constitutional: Negative for appetite change, fatigue and unexpected weight change.   HENT: Positive for tinnitus. Negative for mouth sores.    Eyes: Negative for visual disturbance.   Respiratory: Negative for cough and shortness of breath.    Cardiovascular: Negative for chest pain.   Gastrointestinal: Negative for abdominal pain and diarrhea.   Genitourinary: Negative for frequency.   Musculoskeletal: Negative for back pain.   Skin: Negative for rash.   Neurological: Negative for headaches.   Hematological: Negative for adenopathy.   Psychiatric/Behavioral: The patient is not nervous/anxious.    All other systems reviewed and are negative.      PMFSH: all information reviewed and updated as relevant to today's visit  Objective:      Physical Exam   Constitutional: He is oriented to person, place, and time. He appears well-developed and well-nourished.   HENT:   Mouth/Throat: No oropharyngeal exudate.   Cardiovascular: Normal rate and normal heart sounds.    Pulmonary/Chest: Effort normal and breath sounds normal. He has no wheezes.   Abdominal: Soft. Bowel sounds are normal. There is no tenderness.   Musculoskeletal: He exhibits no edema or tenderness.   Lymphadenopathy:     He has no cervical adenopathy.   Neurological: He is alert and oriented to person, place, and time. Coordination normal.   Skin: Skin is warm and dry. No rash noted.   Psychiatric: He has a normal mood and affect. Judgment and thought content normal.   Vitals reviewed.        LABS:  WBC   Date Value Ref Range Status   03/22/2018 6.14 3.90 - 12.70 K/uL Final     Hemoglobin   Date Value Ref Range Status   03/22/2018 11.5 (L) 14.0 - 18.0 g/dL Final     Hematocrit   Date Value Ref Range Status   03/22/2018 35.1 (L) 40.0 - 54.0 % Final     Platelets   Date Value Ref Range Status   03/22/2018 331 150 - 350 K/uL Final     Gran # (ANC)   Date Value Ref Range Status   03/22/2018 4.1 1.8 - 7.7 K/uL Final     Comment:      The ANC is based on a white cell differential from an   automated cell counter. It has not been microscopically   reviewed for the presence of abnormal cells. Clinical   correlation is required.         Chemistry        Component Value Date/Time     03/22/2018 1007    K 5.0 03/22/2018 1007     03/22/2018 1007    CO2 27 03/22/2018 1007    BUN 18 03/22/2018 1007    CREATININE 1.2 03/22/2018 1007     03/22/2018 1007        Component Value Date/Time    CALCIUM 9.4 03/22/2018 1007    ALKPHOS 78 03/22/2018 1007    AST 16 03/22/2018 1007    ALT 19 03/22/2018 1007    BILITOT 0.5 03/22/2018 1007    ESTGFRAFRICA >60.0 03/22/2018 1007    EGFRNONAA >60.0 03/22/2018 1007          Assessment:       1. Intrahepatic cholangiocarcinoma    2. Malignant neoplasm metastatic to lung, unspecified laterality    3. Secondary adenocarcinoma of retroperitoneum        Plan:        1,2,3. He wis doing well clinically and will proceed with cycle 3. Day 8 of Gemzar and will return in after his trip to [facility]     Above care plan was discussed with patient and accompanying wife and all questions were addressed to their satisfaction

## 2018-03-24 ENCOUNTER — INFUSION (OUTPATIENT)
Dept: INFUSION THERAPY | Facility: HOSPITAL | Age: 68
End: 2018-03-24
Attending: INTERNAL MEDICINE
Payer: MEDICARE

## 2018-03-24 DIAGNOSIS — C22.1 INTRAHEPATIC CHOLANGIOCARCINOMA: Primary | ICD-10-CM

## 2018-03-24 PROCEDURE — 96372 THER/PROPH/DIAG INJ SC/IM: CPT

## 2018-03-24 PROCEDURE — 63600175 PHARM REV CODE 636 W HCPCS: Mod: JG | Performed by: INTERNAL MEDICINE

## 2018-03-24 RX ADMIN — FILGRASTIM 480 MCG: 480 INJECTION, SOLUTION INTRAVENOUS; SUBCUTANEOUS at 09:03

## 2018-03-24 NOTE — NURSING
Pt. Here today for neupogen injection. Tolerated well. No questions at this time. Denies new or worsening side effects.

## 2018-04-04 ENCOUNTER — TELEPHONE (OUTPATIENT)
Dept: HEMATOLOGY/ONCOLOGY | Facility: CLINIC | Age: 68
End: 2018-04-04

## 2018-04-09 ENCOUNTER — LAB VISIT (OUTPATIENT)
Dept: LAB | Facility: HOSPITAL | Age: 68
End: 2018-04-09
Attending: INTERNAL MEDICINE
Payer: MEDICARE

## 2018-04-09 ENCOUNTER — OFFICE VISIT (OUTPATIENT)
Dept: HEMATOLOGY/ONCOLOGY | Facility: CLINIC | Age: 68
End: 2018-04-09
Payer: MEDICARE

## 2018-04-09 VITALS
DIASTOLIC BLOOD PRESSURE: 94 MMHG | WEIGHT: 185.44 LBS | SYSTOLIC BLOOD PRESSURE: 167 MMHG | OXYGEN SATURATION: 98 % | BODY MASS INDEX: 27.46 KG/M2 | HEIGHT: 69 IN | TEMPERATURE: 98 F | HEART RATE: 67 BPM | RESPIRATION RATE: 16 BRPM

## 2018-04-09 DIAGNOSIS — C22.1 INTRAHEPATIC CHOLANGIOCARCINOMA: Primary | ICD-10-CM

## 2018-04-09 DIAGNOSIS — C22.1 INTRAHEPATIC CHOLANGIOCARCINOMA: ICD-10-CM

## 2018-04-09 DIAGNOSIS — C78.6 SECONDARY ADENOCARCINOMA OF RETROPERITONEUM: ICD-10-CM

## 2018-04-09 DIAGNOSIS — C78.00 MALIGNANT NEOPLASM METASTATIC TO LUNG, UNSPECIFIED LATERALITY: ICD-10-CM

## 2018-04-09 LAB
ALBUMIN SERPL BCP-MCNC: 3.9 G/DL
ALP SERPL-CCNC: 63 U/L
ALT SERPL W/O P-5'-P-CCNC: 15 U/L
ANION GAP SERPL CALC-SCNC: 8 MMOL/L
AST SERPL-CCNC: 14 U/L
BILIRUB SERPL-MCNC: 0.9 MG/DL
BUN SERPL-MCNC: 16 MG/DL
CALCIUM SERPL-MCNC: 9.5 MG/DL
CHLORIDE SERPL-SCNC: 105 MMOL/L
CO2 SERPL-SCNC: 28 MMOL/L
CREAT SERPL-MCNC: 1 MG/DL
ERYTHROCYTE [DISTWIDTH] IN BLOOD BY AUTOMATED COUNT: 15.9 %
EST. GFR  (AFRICAN AMERICAN): >60 ML/MIN/1.73 M^2
EST. GFR  (NON AFRICAN AMERICAN): >60 ML/MIN/1.73 M^2
GLUCOSE SERPL-MCNC: 111 MG/DL
HCT VFR BLD AUTO: 36.4 %
HGB BLD-MCNC: 11.8 G/DL
IMM GRANULOCYTES # BLD AUTO: 0.02 K/UL
MCH RBC QN AUTO: 31.6 PG
MCHC RBC AUTO-ENTMCNC: 32.4 G/DL
MCV RBC AUTO: 98 FL
NEUTROPHILS # BLD AUTO: 3.3 K/UL
PLATELET # BLD AUTO: 438 K/UL
PMV BLD AUTO: 8.8 FL
POTASSIUM SERPL-SCNC: 4.3 MMOL/L
PROT SERPL-MCNC: 7.2 G/DL
RBC # BLD AUTO: 3.73 M/UL
SODIUM SERPL-SCNC: 141 MMOL/L
WBC # BLD AUTO: 5.07 K/UL

## 2018-04-09 PROCEDURE — 99214 OFFICE O/P EST MOD 30 MIN: CPT | Mod: S$GLB,,, | Performed by: INTERNAL MEDICINE

## 2018-04-09 PROCEDURE — 85027 COMPLETE CBC AUTOMATED: CPT

## 2018-04-09 PROCEDURE — 80053 COMPREHEN METABOLIC PANEL: CPT

## 2018-04-09 PROCEDURE — 99999 PR PBB SHADOW E&M-EST. PATIENT-LVL IV: CPT | Mod: PBBFAC,,, | Performed by: INTERNAL MEDICINE

## 2018-04-09 PROCEDURE — 36415 COLL VENOUS BLD VENIPUNCTURE: CPT

## 2018-04-09 NOTE — PROGRESS NOTES
"Subjective:       Patient ID: Eric Brown is a 67 y.o. male.    Chief Complaint: Intrahepatic cholangiocarcinoma  ONCOLOGIC HISTORY:  Mr. Brown is a 67 year old male who in 6/2012 underwent a distal pancreatectomy for a PNET and a liver resection for a T2N0M0 intrahepatic cholangiocarcinoma.  Unfortunately had a recurrence of the cholangiocarcinoma and underwent a repeat resection at [facility].  Completed adjuvant treatment with cisplatin and gemcitabine - 6 cycles.     He has been on surveillance.     He underwent PET scan on 1/3/18 at [facility] which reveals "Scattered subcentimeter pulmonary nodules, some new/larger than on 07/31/2017, nonspecific, possibly metastatic or inflammatory, to be followed.  Small metabolically active foci in the liver, at sites of previously noted subcentimeter hypervascular nodules on liver protocol CT dated 10/02/2017, concerning for metastases Metabolically active periportal and retroperitoneal adenopathy, larger than on 10/02/2017, consistent with metastatic disease. Spiculated soft tissue in the pancreatectomy bed region, with low-grade metabolic activity, most likely related to postsurgical change, but local recurrence cannot be entirely excluded, to be followed"  He underwent biopsy of soft tissue of peritoneum and pathology revealed metastatic cancer compatible with mets from cholangiocarcinoma. He has been recommended to start chemo with Cisplatin and gemzar and they are also evaluating him for trials.               HPI He completed cycle 3, day 8 Cisplatin and Gemzar. He recently went to [facility] and PET scan from 4/2/18 revealed "Persistent metabolically active tumor involving retroperitoneal nodes. Dominant retroperitoneal aggregate appears slightly more extensive compared to January 2018 PET/CT; however there is decrease in conspicuity of previously seen common iliac node"  His Foundation results on  3/12/18: Genomic Alterations Identified, STK11 loss, IDH1 " R132C, PBRM1 N458fs*5 Additional Findings: Microsatellite status MS-Stable, Tumor Mutation [place] TMB-Low; 4 Muts/Mb   He has been offered a Phase III, Randomized, double blind, placebo controlled study of AG -120 in previously treated subjects with non resectable or metastatic cholangiocarcinoma with IDH1 mutation or a phase II trial [id number] ([id number]), a mIDH1 inhibitor.      Review of Systems   Constitutional: Negative for appetite change, fatigue and unexpected weight change.   HENT: Negative for mouth sores.    Eyes: Negative for visual disturbance.   Respiratory: Negative for cough and shortness of breath.    Cardiovascular: Negative for chest pain.   Gastrointestinal: Negative for abdominal pain and diarrhea.   Genitourinary: Negative for frequency.   Musculoskeletal: Negative for back pain.   Skin: Negative for rash.   Neurological: Negative for headaches.   Hematological: Negative for adenopathy.   Psychiatric/Behavioral: The patient is not nervous/anxious.    All other systems reviewed and are negative.      PMFSH: all information reviewed and updated as relevant to today's visit  Objective:      Physical Exam   Constitutional: He is oriented to person, place, and time. He appears well-developed and well-nourished.   HENT:   Mouth/Throat: No oropharyngeal exudate.   Cardiovascular: Normal rate and normal heart sounds.    Pulmonary/Chest: Effort normal and breath sounds normal. He has no wheezes.   Abdominal: Soft. Bowel sounds are normal. There is no tenderness.   Musculoskeletal: He exhibits no edema or tenderness.   Lymphadenopathy:     He has no cervical adenopathy.   Neurological: He is alert and oriented to person, place, and time. Coordination normal.   Skin: Skin is warm and dry. No rash noted.   Psychiatric: He has a normal mood and affect. Judgment and thought content normal.   Vitals reviewed.         LABS:  WBC   Date Value Ref Range Status   04/09/2018 5.07 3.90 - 12.70 K/uL Final      Hemoglobin   Date Value Ref Range Status   04/09/2018 11.8 (L) 14.0 - 18.0 g/dL Final     Hematocrit   Date Value Ref Range Status   04/09/2018 36.4 (L) 40.0 - 54.0 % Final     Platelets   Date Value Ref Range Status   04/09/2018 438 (H) 150 - 350 K/uL Final     Gran # (ANC)   Date Value Ref Range Status   04/09/2018 3.3 1.8 - 7.7 K/uL Final     Comment:     The ANC is based on a white cell differential from an   automated cell counter. It has not been microscopically   reviewed for the presence of abnormal cells. Clinical   correlation is required.         Chemistry        Component Value Date/Time     04/09/2018 0829    K 4.3 04/09/2018 0829     04/09/2018 0829    CO2 28 04/09/2018 0829    BUN 16 04/09/2018 0829    CREATININE 1.0 04/09/2018 0829     (H) 04/09/2018 0829        Component Value Date/Time    CALCIUM 9.5 04/09/2018 0829    ALKPHOS 63 04/09/2018 0829    AST 14 04/09/2018 0829    ALT 15 04/09/2018 0829    BILITOT 0.9 04/09/2018 0829    ESTGFRAFRICA >60.0 04/09/2018 0829    EGFRNONAA >60.0 04/09/2018 0829          Assessment:       1. Intrahepatic cholangiocarcinoma    2. Malignant neoplasm metastatic to lung, unspecified laterality    3. Secondary adenocarcinoma of retroperitoneum        Plan:         1,2,3. He will proceed with phase III IDH inhibitor trial at [facility]  He will call me as needed for issues.    Above care plan was discussed with patient and accompanying wife and all questions were addressed to their satisfaction '

## 2018-05-02 RX ORDER — ATORVASTATIN CALCIUM 10 MG/1
10 TABLET, FILM COATED ORAL DAILY
Qty: 90 TABLET | Refills: 3 | Status: SHIPPED | OUTPATIENT
Start: 2018-05-02 | End: 2019-02-06 | Stop reason: SDUPTHER

## 2018-05-31 ENCOUNTER — PATIENT MESSAGE (OUTPATIENT)
Dept: HEMATOLOGY/ONCOLOGY | Facility: CLINIC | Age: 68
End: 2018-05-31

## 2018-07-25 NOTE — PROGRESS NOTES
Assessment /Plan     For exam results, see Encounter Report.    Nuclear sclerosis, bilateral    Open angle with borderline findings and low glaucoma risk in both eyes    Myopia with presbyopia, bilateral            1.  Educated on cataracts and affects on vision.  Patient unhappy with vision.  Educated pt most likely will not qualify for surgery at this time but still requests consult.   2.  Due to increased c/d ratio but previous testing normal OU.  Eye pressure stable OU and no family history of glaucoma.  Continue to monitor yearly.  3. Continue w/ current rx          RTC 1 year for routine exam.

## 2018-07-26 ENCOUNTER — OFFICE VISIT (OUTPATIENT)
Dept: OPTOMETRY | Facility: CLINIC | Age: 68
End: 2018-07-26
Payer: MEDICARE

## 2018-07-26 DIAGNOSIS — H52.4 MYOPIA WITH PRESBYOPIA, BILATERAL: ICD-10-CM

## 2018-07-26 DIAGNOSIS — H25.13 NUCLEAR SCLEROSIS, BILATERAL: Primary | ICD-10-CM

## 2018-07-26 DIAGNOSIS — H40.013 OPEN ANGLE WITH BORDERLINE FINDINGS AND LOW GLAUCOMA RISK IN BOTH EYES: ICD-10-CM

## 2018-07-26 DIAGNOSIS — H52.13 MYOPIA WITH PRESBYOPIA, BILATERAL: ICD-10-CM

## 2018-07-26 PROCEDURE — 92014 COMPRE OPH EXAM EST PT 1/>: CPT | Mod: S$GLB,,, | Performed by: OPTOMETRIST

## 2018-07-26 PROCEDURE — 99999 PR PBB SHADOW E&M-EST. PATIENT-LVL II: CPT | Mod: PBBFAC,,, | Performed by: OPTOMETRIST

## 2018-07-26 RX ORDER — PREDNISONE 50 MG/1
TABLET ORAL
Refills: 2 | COMMUNITY
Start: 2018-06-08 | End: 2018-08-13 | Stop reason: ALTCHOICE

## 2018-07-26 RX ORDER — OMEPRAZOLE 20 MG/1
CAPSULE, DELAYED RELEASE ORAL
COMMUNITY
Start: 2018-02-28 | End: 2019-02-18 | Stop reason: SDUPTHER

## 2018-07-26 RX ORDER — TAMSULOSIN HYDROCHLORIDE 0.4 MG/1
0.4 CAPSULE ORAL
COMMUNITY
Start: 2018-02-28 | End: 2018-08-13 | Stop reason: SDUPTHER

## 2018-07-26 RX ORDER — AMOXICILLIN 500 MG
2 CAPSULE ORAL
COMMUNITY
End: 2018-08-13

## 2018-07-26 RX ORDER — DIPHENHYDRAMINE HCL 50 MG
50 CAPSULE ORAL
COMMUNITY
End: 2018-08-13 | Stop reason: ALTCHOICE

## 2018-08-07 RX ORDER — FELODIPINE 10 MG/1
10 TABLET, EXTENDED RELEASE ORAL DAILY
Qty: 90 TABLET | Refills: 0 | Status: SHIPPED | OUTPATIENT
Start: 2018-08-07 | End: 2018-11-04 | Stop reason: SDUPTHER

## 2018-08-07 RX ORDER — LOSARTAN POTASSIUM 50 MG/1
50 TABLET ORAL DAILY
Qty: 90 TABLET | Refills: 0 | Status: SHIPPED | OUTPATIENT
Start: 2018-08-07 | End: 2018-08-13

## 2018-08-13 ENCOUNTER — LAB VISIT (OUTPATIENT)
Dept: LAB | Facility: HOSPITAL | Age: 68
End: 2018-08-13
Attending: INTERNAL MEDICINE
Payer: MEDICARE

## 2018-08-13 ENCOUNTER — OFFICE VISIT (OUTPATIENT)
Dept: INTERNAL MEDICINE | Facility: CLINIC | Age: 68
End: 2018-08-13
Payer: MEDICARE

## 2018-08-13 ENCOUNTER — PATIENT MESSAGE (OUTPATIENT)
Dept: INTERNAL MEDICINE | Facility: CLINIC | Age: 68
End: 2018-08-13

## 2018-08-13 VITALS
HEIGHT: 70 IN | HEART RATE: 60 BPM | WEIGHT: 194.69 LBS | DIASTOLIC BLOOD PRESSURE: 84 MMHG | OXYGEN SATURATION: 98 % | BODY MASS INDEX: 27.87 KG/M2 | SYSTOLIC BLOOD PRESSURE: 144 MMHG

## 2018-08-13 DIAGNOSIS — R73.03 PREDIABETES: ICD-10-CM

## 2018-08-13 DIAGNOSIS — E78.5 HYPERLIPIDEMIA, UNSPECIFIED HYPERLIPIDEMIA TYPE: ICD-10-CM

## 2018-08-13 DIAGNOSIS — I10 ESSENTIAL HYPERTENSION: ICD-10-CM

## 2018-08-13 DIAGNOSIS — I10 ESSENTIAL HYPERTENSION: Primary | ICD-10-CM

## 2018-08-13 LAB
ALBUMIN SERPL BCP-MCNC: 3.7 G/DL
ALP SERPL-CCNC: 61 U/L
ALT SERPL W/O P-5'-P-CCNC: 16 U/L
ANION GAP SERPL CALC-SCNC: 7 MMOL/L
AST SERPL-CCNC: 17 U/L
BILIRUB SERPL-MCNC: 0.7 MG/DL
BUN SERPL-MCNC: 21 MG/DL
CALCIUM SERPL-MCNC: 9 MG/DL
CHLORIDE SERPL-SCNC: 107 MMOL/L
CHOLEST SERPL-MCNC: 176 MG/DL
CHOLEST/HDLC SERPL: 3.7 {RATIO}
CO2 SERPL-SCNC: 27 MMOL/L
CREAT SERPL-MCNC: 1.1 MG/DL
EST. GFR  (AFRICAN AMERICAN): >60 ML/MIN/1.73 M^2
EST. GFR  (NON AFRICAN AMERICAN): >60 ML/MIN/1.73 M^2
ESTIMATED AVG GLUCOSE: 123 MG/DL
GLUCOSE SERPL-MCNC: 121 MG/DL
HBA1C MFR BLD HPLC: 5.9 %
HDLC SERPL-MCNC: 47 MG/DL
HDLC SERPL: 26.7 %
LDLC SERPL CALC-MCNC: 108.8 MG/DL
NONHDLC SERPL-MCNC: 129 MG/DL
POTASSIUM SERPL-SCNC: 4.8 MMOL/L
PROT SERPL-MCNC: 6.9 G/DL
SODIUM SERPL-SCNC: 141 MMOL/L
TRIGL SERPL-MCNC: 101 MG/DL

## 2018-08-13 PROCEDURE — 99214 OFFICE O/P EST MOD 30 MIN: CPT | Mod: S$GLB,,, | Performed by: INTERNAL MEDICINE

## 2018-08-13 PROCEDURE — 80053 COMPREHEN METABOLIC PANEL: CPT

## 2018-08-13 PROCEDURE — 83036 HEMOGLOBIN GLYCOSYLATED A1C: CPT

## 2018-08-13 PROCEDURE — 36415 COLL VENOUS BLD VENIPUNCTURE: CPT

## 2018-08-13 PROCEDURE — 80061 LIPID PANEL: CPT

## 2018-08-13 PROCEDURE — 99999 PR PBB SHADOW E&M-EST. PATIENT-LVL III: CPT | Mod: PBBFAC,,, | Performed by: INTERNAL MEDICINE

## 2018-08-13 RX ORDER — LOSARTAN POTASSIUM AND HYDROCHLOROTHIAZIDE 12.5; 1 MG/1; MG/1
1 TABLET ORAL DAILY
Qty: 90 TABLET | Refills: 3 | Status: SHIPPED | OUTPATIENT
Start: 2018-08-13 | End: 2018-09-10 | Stop reason: ALTCHOICE

## 2018-08-13 NOTE — PROGRESS NOTES
CHIEF COMPLAINT:  Hypertension.    HISTORY OF PRESENT ILLNESS:  The patient is a 67-year-old gentleman who is   currently being treated at [facility] for cholangiocarcinoma.  He is on a   treatment protocol and is seen there every two weeks.  We also see the patient   for hypertension as well as hyperlipidemia.  The patient is on Plendil 10 mg one   p.o. every day as well as losartan 50 mg one p.o. every day for his blood   pressure.  The patient states that his blood pressure over the past few months   been running high.  He took it upon himself about one month ago to increase his   Plendil from 10 to 20 mg a day as well as his losartan from 50 to 100 mg.  What   he has been doing he is taking Plendil 10 mg b.i.d. and losartan 50 mg b.i.d.    He did bring his blood pressure down from the 160s-170s down to around the 140s.    REVIEW OF SYSTEMS:  The patient reports no visual change, no chest pain, no   shortness of breath, no dizziness, no lightheadedness.  He does have a history   of prediabetes.  His last A1c was 5.4.    PHYSICAL EXAMINATION:  GENERAL APPEARANCE:  No acute distress.  NECK:  Trachea is midline without JVD.  PULMONARY:  Good inspiratory, expiratory breath sounds are heard.  Lungs are   clear to auscultation.  CARDIOVASCULAR:  S1, S2.  Rhythm appeared to be normal.  EXTREMITIES:  With trace edema.  ABDOMEN:  Nontender, nondistended, without hepatosplenomegaly.    COMMENTS:  It should be noted the patient is on Lipitor 10 mg once a day.  He is   only taking a half a tablet a day for a total dose of 5 mg.  In regards to the   patient's blood pressure, it was discussed with him I would like to decrease his   Plendil back to 10 mg once a day and change his losartan to losartan HCT   100/12.5.    ASSESSMENT:  1.  Hypertension.  2.  Cholangiocarcinoma.  3.  Hyperlipidemia.    PLAN:  Decrease Plendil back to 10 mg once a day, change losartan 50 mg to   losartan /12.5 one p.o. every day.  We will  check a CMP, A1c, lipid panel   today.  The patient is to follow up in four weeks for reevaluation of blood   pressure.      PREM/RYNE  dd: 08/13/2018 08:49:02 (CDT)  td: 08/13/2018 12:24:45 (CDT)  Doc ID   #[id number]  Job ID #[id number]    CC:

## 2018-09-10 ENCOUNTER — OFFICE VISIT (OUTPATIENT)
Dept: INTERNAL MEDICINE | Facility: CLINIC | Age: 68
End: 2018-09-10
Payer: MEDICARE

## 2018-09-10 VITALS
SYSTOLIC BLOOD PRESSURE: 132 MMHG | WEIGHT: 194.88 LBS | OXYGEN SATURATION: 98 % | BODY MASS INDEX: 28.87 KG/M2 | HEIGHT: 69 IN | TEMPERATURE: 98 F | DIASTOLIC BLOOD PRESSURE: 80 MMHG | HEART RATE: 62 BPM

## 2018-09-10 DIAGNOSIS — I10 ESSENTIAL HYPERTENSION: Primary | ICD-10-CM

## 2018-09-10 DIAGNOSIS — C22.1 INTRAHEPATIC CHOLANGIOCARCINOMA: ICD-10-CM

## 2018-09-10 PROCEDURE — 99213 OFFICE O/P EST LOW 20 MIN: CPT | Mod: S$PBB,,, | Performed by: INTERNAL MEDICINE

## 2018-09-10 PROCEDURE — 99213 OFFICE O/P EST LOW 20 MIN: CPT | Mod: PBBFAC | Performed by: INTERNAL MEDICINE

## 2018-09-10 PROCEDURE — 99999 PR PBB SHADOW E&M-EST. PATIENT-LVL III: CPT | Mod: PBBFAC,,, | Performed by: INTERNAL MEDICINE

## 2018-09-10 RX ORDER — LOSARTAN POTASSIUM AND HYDROCHLOROTHIAZIDE 25; 100 MG/1; MG/1
1 TABLET ORAL DAILY
Qty: 90 TABLET | Refills: 3 | Status: SHIPPED | OUTPATIENT
Start: 2018-09-10 | End: 2018-11-15 | Stop reason: ALTCHOICE

## 2018-09-11 NOTE — PROGRESS NOTES
CHIEF COMPLAINT:  Followup.    HISTORY OF PRESENT ILLNESS:  The patient is a 67-year-old gentleman who we see   for hypertension, hyperlipidemia who is currently being treated for   cholangiocarcinoma who comes in today for followup of hypertension.  The patient   was taking Plendil 10 mg twice a day as well as losartan 50 mg b.i.d.  When we   last saw him, we decreased his prednisone back to 10 mg once a day.  We changed   his losartan to losartan /12.5.  He is here today for followup.  He   reports his blood pressure has been doing better, averaging around 140/80.    REVIEW OF SYSTEMS:  No chest pain, no shortness of breath, no dizziness, no   lightheadedness.    PHYSICAL EXAMINATION:  GENERAL APPEARANCE:  No acute distress.  PULMONARY:  Good inspiratory, expiratory breath sounds are heard.  Lungs are   clear to auscultation.  CARDIOVASCULAR:  S1, S2.  EXTREMITIES:  With trace edema.  ABDOMEN:  Nontender, nondistended, without hepatosplenomegaly.  Repeat blood pressure taken by myself at the beginning of examination was   132/80, at the end of examination was 140/80.    ASSESSMENT:  1.  Hypertension.  2.  History of cholangiocarcinoma.    PLAN:  We will change the patient's losartan HCT to 100/25.  He is to follow up   in one month for reevaluation.      PREM/RYNE  dd: 09/11/2018 08:17:02 (CDT)  td: 09/11/2018 15:55:25 (CDT)  Doc ID   #[id number]  Job ID #[id number]    CC:

## 2018-09-24 ENCOUNTER — TELEPHONE (OUTPATIENT)
Dept: INTERNAL MEDICINE | Facility: CLINIC | Age: 68
End: 2018-09-24

## 2018-09-24 NOTE — TELEPHONE ENCOUNTER
----- Message from Lynn Warner sent at 9/24/2018  3:44 PM CDT -----  Contact: self/[phone]      ----- Message -----  From: Danii Altamirano  Sent: 9/24/2018   2:42 PM  To: [facility] Staff    Patient called in regards needing to talk with PCP about his blood pressure with medication has going lower - 103/60 . Please call and advise. Thank you

## 2018-09-24 NOTE — TELEPHONE ENCOUNTER
Me          9/24/18 4:04 PM   Note      Pt. states that over the weekend he noticed his b/p decreasing 103/60 and 106/60. He is only symptomatic when he rises up quickly. He wants to cut back on his b/p med/dieuritic portions to 12.5 mg or see you in the am if possible or make b/p medication changes? He is going to [facility] tomorrow.      Me

## 2018-09-24 NOTE — TELEPHONE ENCOUNTER
Pt. states that over the weekend he noticed his b/p decreasing 103/60 and 106/60. He is only symptomatic when he rises up quickly. He wants to cut back on his b/p med/dieuritic portions to 12.5 mg or see you in the am if possible or make b/p medication changes? He is going to [facility] tomorrow.

## 2018-09-25 ENCOUNTER — OFFICE VISIT (OUTPATIENT)
Dept: INTERNAL MEDICINE | Facility: CLINIC | Age: 68
End: 2018-09-25
Payer: MEDICARE

## 2018-09-25 ENCOUNTER — LAB VISIT (OUTPATIENT)
Dept: LAB | Facility: HOSPITAL | Age: 68
End: 2018-09-25
Attending: INTERNAL MEDICINE
Payer: MEDICARE

## 2018-09-25 VITALS
WEIGHT: 194 LBS | HEIGHT: 69 IN | OXYGEN SATURATION: 97 % | SYSTOLIC BLOOD PRESSURE: 130 MMHG | DIASTOLIC BLOOD PRESSURE: 76 MMHG | HEART RATE: 65 BPM | BODY MASS INDEX: 28.73 KG/M2 | TEMPERATURE: 98 F

## 2018-09-25 DIAGNOSIS — C22.1 INTRAHEPATIC CHOLANGIOCARCINOMA: ICD-10-CM

## 2018-09-25 DIAGNOSIS — I10 ESSENTIAL HYPERTENSION: Primary | ICD-10-CM

## 2018-09-25 DIAGNOSIS — I10 ESSENTIAL HYPERTENSION: ICD-10-CM

## 2018-09-25 LAB
ANION GAP SERPL CALC-SCNC: 8 MMOL/L
BUN SERPL-MCNC: 24 MG/DL
CALCIUM SERPL-MCNC: 9.5 MG/DL
CHLORIDE SERPL-SCNC: 102 MMOL/L
CO2 SERPL-SCNC: 29 MMOL/L
CREAT SERPL-MCNC: 1.3 MG/DL
EST. GFR  (AFRICAN AMERICAN): >60 ML/MIN/1.73 M^2
EST. GFR  (NON AFRICAN AMERICAN): 56 ML/MIN/1.73 M^2
GLUCOSE SERPL-MCNC: 121 MG/DL
POTASSIUM SERPL-SCNC: 4.4 MMOL/L
SODIUM SERPL-SCNC: 139 MMOL/L

## 2018-09-25 PROCEDURE — 99213 OFFICE O/P EST LOW 20 MIN: CPT | Mod: S$PBB,,, | Performed by: INTERNAL MEDICINE

## 2018-09-25 PROCEDURE — 80048 BASIC METABOLIC PNL TOTAL CA: CPT

## 2018-09-25 PROCEDURE — 36415 COLL VENOUS BLD VENIPUNCTURE: CPT

## 2018-09-25 PROCEDURE — 99214 OFFICE O/P EST MOD 30 MIN: CPT | Mod: PBBFAC | Performed by: INTERNAL MEDICINE

## 2018-09-25 PROCEDURE — 99999 PR PBB SHADOW E&M-EST. PATIENT-LVL IV: CPT | Mod: PBBFAC,,, | Performed by: INTERNAL MEDICINE

## 2018-09-25 NOTE — PROGRESS NOTES
CHIEF COMPLAINT:  Hypotension.    HISTORY OF PRESENT ILLNESS:  The patient is a 67-year-old gentleman who we see   for hypertension, hyperlipidemia, who is also being treated for   cholangiocarcinoma, who comes in today for low blood pressure.  The patient's   blood pressure yesterday was 103/60.  This occurred after he was exercising.  It   did come up toward the evening.  This morning, his blood pressure was 139/77.    The patient does bring in the list of readings.  On September 11, it was 123/72.    On September 14, it was 126/78.  On September 20, it was 140/79.  On September 21, it was 121/70.  On last visit, the patient's losartan HCT was increased   from 100-12.5 to 100-25.    REVIEW OF SYSTEMS:  The patient reports today is feeling well.  The patient is   exercising at [place].  The patient does state that after exercise, he does go   into the sauna, which is set around 130.  He sits in there for about 20 to 30   minutes.    PHYSICAL EXAMINATION:  GENERAL APPEARANCE:  No acute distress.  VITAL SIGNS:  Repeat blood pressure taken by myself was 130/76.  HEENT:  Trachea was midline without JVD.  PULMONARY:  Good inspiratory and expiratory breath sounds are heard.  Lungs are   clear to auscultation.  CARDIOVASCULAR:  S1 and S2.  EXTREMITIES:  With trace ankle edema.  ABDOMEN:  Nontender and nondistended without hepatosplenomegaly.    COMMENTS:  I did discuss with the patient that I felt that his episode of   hypotension occurred because he was in the sauna.    ASSESSMENT:  1.  Hypertension, currently stable.  2.  Cholangiocarcinoma.    PLAN:  No change in medication at this time.  The patient was counseled that if   he does go on the sauna to do it in a much lower temperature and for not as   long.  We will check a BMP today.      PREM/RYNE  dd: 09/25/2018 10:28:12 (CDT)  td: 09/25/2018 20:31:48 (CDT)  Doc ID   #[id number]  Job ID #[id number]    CC:

## 2018-09-26 ENCOUNTER — PATIENT MESSAGE (OUTPATIENT)
Dept: INTERNAL MEDICINE | Facility: CLINIC | Age: 68
End: 2018-09-26

## 2018-10-03 ENCOUNTER — OFFICE VISIT (OUTPATIENT)
Dept: OPHTHALMOLOGY | Facility: CLINIC | Age: 68
End: 2018-10-03
Payer: MEDICARE

## 2018-10-03 DIAGNOSIS — H25.13 NUCLEAR SCLEROSIS, BILATERAL: Primary | ICD-10-CM

## 2018-10-03 DIAGNOSIS — H35.372 EPIRETINAL MEMBRANE (ERM) OF LEFT EYE: ICD-10-CM

## 2018-10-03 PROCEDURE — 99999 PR PBB SHADOW E&M-EST. PATIENT-LVL II: CPT | Mod: PBBFAC,,, | Performed by: OPHTHALMOLOGY

## 2018-10-03 PROCEDURE — 99212 OFFICE O/P EST SF 10 MIN: CPT | Mod: PBBFAC | Performed by: OPHTHALMOLOGY

## 2018-10-03 PROCEDURE — 92014 COMPRE OPH EXAM EST PT 1/>: CPT | Mod: S$PBB,,, | Performed by: OPHTHALMOLOGY

## 2018-10-03 RX ORDER — MOXIFLOXACIN 5 MG/ML
1 SOLUTION/ DROPS OPHTHALMIC
Status: CANCELLED | OUTPATIENT
Start: 2018-10-03

## 2018-10-03 RX ORDER — TROPICAMIDE 10 MG/ML
1 SOLUTION/ DROPS OPHTHALMIC
Status: CANCELLED | OUTPATIENT
Start: 2018-10-03

## 2018-10-03 RX ORDER — PHENYLEPHRINE HYDROCHLORIDE 25 MG/ML
1 SOLUTION/ DROPS OPHTHALMIC
Status: CANCELLED | OUTPATIENT
Start: 2018-10-03

## 2018-10-03 RX ORDER — PREDNISONE 50 MG/1
TABLET ORAL
Refills: 2 | COMMUNITY
Start: 2018-09-16 | End: 2019-05-06

## 2018-10-03 RX ORDER — TETRACAINE HYDROCHLORIDE 5 MG/ML
1 SOLUTION OPHTHALMIC
Status: CANCELLED | OUTPATIENT
Start: 2018-10-03

## 2018-10-03 RX ORDER — VIT C/E/ZN/COPPR/LUTEIN/ZEAXAN 250MG-90MG
5000 CAPSULE ORAL
COMMUNITY

## 2018-10-03 NOTE — PROGRESS NOTES
HPI     Referred by Dr. Turpin    H/o Cholangiocarcinoma of biliary tract      Patient here for a cataract evaluation. Patient states he has been having   problems with glare at night and is getting progressively worse. Denies   flashes diplopia, photophobia, HA's, or pain. (+)floaters.       Last edited by Melissa Estevez on 10/3/2018  1:14 PM. (History)            Assessment /Plan     For exam results, see Encounter Report.    Nuclear sclerosis, bilateral    Epiretinal membrane (ERM) of left eye      Visually Significant Cataract: Patient reports decreased vision consistent with the clinical amount of lenticular opacity, which reaches the level of visual significance and affects activities of daily living. Risks, benefits, and alternatives to cataract surgery were discussed and the consent reviewed. IOL options were discussed, including ATIOLs and the associated side effects and additional patient cost associated with them.   IOL Selections:   Right eye  IOL: ZXROO 12.5     Left eye  IOL: DEA321 12.0 at 85 (ERM, discussed limitations and retina eval)    Pt wishes to have right eye done first.  The patient expresses a desire to reduce spectacle dependence. I reviewed various IOL and LASER refractive surgical options and we will attempt to minimize spectacle dependence by managing astigmatism and optimizing IOL selection. Femtosecond LASER assisted cataract surgery (FLACS) technology was explained to the patient with educational videos and discussion.  The patient voices understanding and wishes to implement this technology during the cataract procedure.  I explained the increased precision of the LASER versus manual techniques, especially as it relates to astigmatism reduction with arcuate incisions.  I emphasized that although our goal is to reduce the need for refractive correction after surgery, there may still be a need for spectacle correction to achieve optimal visual acuity, and that a reasonable range of  functional vision should be the expectation.  No guarantees are made about post operative refraction or visual acuity, as the eye may heal in unpredictable ways, and the standard risks, benefits, and alternatives to cataract surgery were explained.  The patient understands that the refractive portions of this cataract procedure are not covered by insurance, and that there is an out of pocket expense of $2250 per eye. I also explained that even though our pre-operative plan is to utilize advanced refractive technologies during surgery, that I may decide to eliminate part or all of this plan if surgical challenges or complications arise, or I feel that it is not in the patient's best interest. Consent forms and an ABN form were given to the patient to review.

## 2018-10-09 ENCOUNTER — TELEPHONE (OUTPATIENT)
Dept: OPHTHALMOLOGY | Facility: CLINIC | Age: 68
End: 2018-10-09

## 2018-10-09 DIAGNOSIS — H25.11 NUCLEAR SCLEROTIC CATARACT OF RIGHT EYE: Primary | ICD-10-CM

## 2018-10-10 ENCOUNTER — TELEPHONE (OUTPATIENT)
Dept: OPHTHALMOLOGY | Facility: CLINIC | Age: 68
End: 2018-10-10

## 2018-10-10 DIAGNOSIS — H25.12 NUCLEAR SCLEROTIC CATARACT OF LEFT EYE: Primary | ICD-10-CM

## 2018-10-10 RX ORDER — PREDNISOLONE ACETATE-GATIFLOXACIN-BROMFENAC .75; 5; 1 MG/ML; MG/ML; MG/ML
1 SUSPENSION/ DROPS OPHTHALMIC 3 TIMES DAILY
Qty: 7 ML | Refills: 3 | Status: SHIPPED | OUTPATIENT
Start: 2018-10-10 | End: 2018-11-21 | Stop reason: CLARIF

## 2018-10-12 DIAGNOSIS — Z13.6 SCREENING FOR AAA (AORTIC ABDOMINAL ANEURYSM): Primary | ICD-10-CM

## 2018-10-15 ENCOUNTER — OFFICE VISIT (OUTPATIENT)
Dept: INTERNAL MEDICINE | Facility: CLINIC | Age: 68
End: 2018-10-15
Payer: MEDICARE

## 2018-10-15 ENCOUNTER — LAB VISIT (OUTPATIENT)
Dept: LAB | Facility: HOSPITAL | Age: 68
End: 2018-10-15
Attending: INTERNAL MEDICINE
Payer: MEDICARE

## 2018-10-15 VITALS
BODY MASS INDEX: 28.96 KG/M2 | WEIGHT: 195.56 LBS | DIASTOLIC BLOOD PRESSURE: 78 MMHG | OXYGEN SATURATION: 97 % | HEART RATE: 69 BPM | HEIGHT: 69 IN | SYSTOLIC BLOOD PRESSURE: 140 MMHG

## 2018-10-15 DIAGNOSIS — I10 ESSENTIAL HYPERTENSION: ICD-10-CM

## 2018-10-15 DIAGNOSIS — I10 ESSENTIAL HYPERTENSION: Primary | ICD-10-CM

## 2018-10-15 DIAGNOSIS — C22.1 INTRAHEPATIC CHOLANGIOCARCINOMA: ICD-10-CM

## 2018-10-15 LAB
ALBUMIN SERPL BCP-MCNC: 3.8 G/DL
ALP SERPL-CCNC: 60 U/L
ALT SERPL W/O P-5'-P-CCNC: 15 U/L
ANION GAP SERPL CALC-SCNC: 10 MMOL/L
AST SERPL-CCNC: 15 U/L
BILIRUB SERPL-MCNC: 0.5 MG/DL
BUN SERPL-MCNC: 27 MG/DL
CALCIUM SERPL-MCNC: 9.4 MG/DL
CHLORIDE SERPL-SCNC: 105 MMOL/L
CO2 SERPL-SCNC: 24 MMOL/L
CREAT SERPL-MCNC: 1.1 MG/DL
EST. GFR  (AFRICAN AMERICAN): >60 ML/MIN/1.73 M^2
EST. GFR  (NON AFRICAN AMERICAN): >60 ML/MIN/1.73 M^2
GLUCOSE SERPL-MCNC: 115 MG/DL
POTASSIUM SERPL-SCNC: 4.2 MMOL/L
PROT SERPL-MCNC: 7.1 G/DL
SODIUM SERPL-SCNC: 139 MMOL/L

## 2018-10-15 PROCEDURE — 99213 OFFICE O/P EST LOW 20 MIN: CPT | Mod: PBBFAC | Performed by: INTERNAL MEDICINE

## 2018-10-15 PROCEDURE — 80053 COMPREHEN METABOLIC PANEL: CPT

## 2018-10-15 PROCEDURE — 36415 COLL VENOUS BLD VENIPUNCTURE: CPT

## 2018-10-15 PROCEDURE — 99213 OFFICE O/P EST LOW 20 MIN: CPT | Mod: S$PBB,,, | Performed by: INTERNAL MEDICINE

## 2018-10-15 PROCEDURE — 99999 PR PBB SHADOW E&M-EST. PATIENT-LVL III: CPT | Mod: PBBFAC,,, | Performed by: INTERNAL MEDICINE

## 2018-10-15 NOTE — PROGRESS NOTES
CHIEF COMPLAINT:  Followup of hypertension.    HISTORY OF PRESENT ILLNESS:  The patient is a 67-year-old gentleman who comes in   today for followup of hypertension.  The patient had his losartan HCT changed   to 100/25.  No problems with medications reported.  The patient did not take his   blood pressure medications this morning.  He was rushing and forgot to take it.    He does report when he was at [facility], his pressure was fine, it was   around 133/79.    REVIEW OF SYSTEMS:  The patient has no new complaints.  He reports no dizziness,   no lightheadedness.  The patient did do a blood test this morning, the results   of which are pending.    PHYSICAL EXAMINATION:  GENERAL APPEARANCE:  No acute distress.  HEENT:  Trachea is midline without JVD.  PULMONARY:  Good inspiratory, expiratory breath sounds are heard.  Lungs are   clear to auscultation.  CARDIOVASCULAR:  S1, S2.  EXTREMITIES:  Without edema.  ABDOMEN:  Nontender, nondistended, without hepatosplenomegaly.    COMMENTS:  Did discuss with the patient as long as blood pressure readings are   in the 130s, I would not make any adjustments at this time.    ASSESSMENT:  1.  Hypertension.  2.  Intrahepatic cholangiocarcinoma.    PLAN:  Continue medication at current dose.  He is to follow up in four months.    We will await the results of his CMP from today.      PREM/RYNE  dd: 10/15/2018 12:45:52 (CDT)  td: 10/16/2018 07:10:55 (CDT)  Doc ID   #[id number]  Job ID #[id number]    CC:

## 2018-10-25 RX ORDER — ALLOPURINOL 100 MG/1
100 TABLET ORAL DAILY
Qty: 90 TABLET | Refills: 3 | Status: SHIPPED | OUTPATIENT
Start: 2018-10-25 | End: 2019-10-20 | Stop reason: SDUPTHER

## 2018-11-07 RX ORDER — LOSARTAN POTASSIUM 50 MG/1
TABLET ORAL
Qty: 90 TABLET | Refills: 0 | OUTPATIENT
Start: 2018-11-07

## 2018-11-07 RX ORDER — FELODIPINE 10 MG/1
TABLET, EXTENDED RELEASE ORAL
Qty: 90 TABLET | Refills: 3 | Status: SHIPPED | OUTPATIENT
Start: 2018-11-07 | End: 2019-02-18 | Stop reason: DRUGHIGH

## 2018-11-09 ENCOUNTER — TELEPHONE (OUTPATIENT)
Dept: INTERNAL MEDICINE | Facility: CLINIC | Age: 68
End: 2018-11-09

## 2018-11-09 NOTE — TELEPHONE ENCOUNTER
"----- Message from Comfort Sam sent at 11/9/2018  9:45 AM CST -----  Contact: Patient [phone]  Pt is calling to speak with someone in regards to getting an appt scheduled with  in regards to his "kidney function". He states that he has to see the doctor as soon as possible. Please call back and advise       Thanks  "

## 2018-11-15 ENCOUNTER — OFFICE VISIT (OUTPATIENT)
Dept: INTERNAL MEDICINE | Facility: CLINIC | Age: 68
End: 2018-11-15
Payer: MEDICARE

## 2018-11-15 ENCOUNTER — DOCUMENTATION ONLY (OUTPATIENT)
Dept: INTERNAL MEDICINE | Facility: CLINIC | Age: 68
End: 2018-11-15

## 2018-11-15 ENCOUNTER — LAB VISIT (OUTPATIENT)
Dept: LAB | Facility: HOSPITAL | Age: 68
End: 2018-11-15
Attending: INTERNAL MEDICINE
Payer: MEDICARE

## 2018-11-15 ENCOUNTER — TELEPHONE (OUTPATIENT)
Dept: INTERNAL MEDICINE | Facility: CLINIC | Age: 68
End: 2018-11-15

## 2018-11-15 VITALS
WEIGHT: 193.13 LBS | BODY MASS INDEX: 28.6 KG/M2 | SYSTOLIC BLOOD PRESSURE: 134 MMHG | TEMPERATURE: 98 F | HEART RATE: 65 BPM | OXYGEN SATURATION: 97 % | HEIGHT: 69 IN | DIASTOLIC BLOOD PRESSURE: 80 MMHG

## 2018-11-15 DIAGNOSIS — R79.89 ELEVATED SERUM CREATININE: ICD-10-CM

## 2018-11-15 DIAGNOSIS — I10 ESSENTIAL HYPERTENSION: ICD-10-CM

## 2018-11-15 DIAGNOSIS — R79.89 ELEVATED SERUM CREATININE: Primary | ICD-10-CM

## 2018-11-15 DIAGNOSIS — I10 ESSENTIAL HYPERTENSION: Primary | ICD-10-CM

## 2018-11-15 LAB
ANION GAP SERPL CALC-SCNC: 7 MMOL/L
BUN SERPL-MCNC: 26 MG/DL
CALCIUM SERPL-MCNC: 9.6 MG/DL
CHLORIDE SERPL-SCNC: 102 MMOL/L
CO2 SERPL-SCNC: 30 MMOL/L
CREAT SERPL-MCNC: 1.9 MG/DL
EST. GFR  (AFRICAN AMERICAN): 41 ML/MIN/1.73 M^2
EST. GFR  (NON AFRICAN AMERICAN): 35 ML/MIN/1.73 M^2
GLUCOSE SERPL-MCNC: 118 MG/DL
POTASSIUM SERPL-SCNC: 4.5 MMOL/L
SODIUM SERPL-SCNC: 139 MMOL/L

## 2018-11-15 PROCEDURE — 36415 COLL VENOUS BLD VENIPUNCTURE: CPT

## 2018-11-15 PROCEDURE — 80048 BASIC METABOLIC PNL TOTAL CA: CPT

## 2018-11-15 PROCEDURE — 99213 OFFICE O/P EST LOW 20 MIN: CPT | Mod: S$GLB,,, | Performed by: INTERNAL MEDICINE

## 2018-11-15 PROCEDURE — 99999 PR PBB SHADOW E&M-EST. PATIENT-LVL IV: CPT | Mod: PBBFAC,,, | Performed by: INTERNAL MEDICINE

## 2018-11-15 RX ORDER — LOSARTAN POTASSIUM 100 MG/1
100 TABLET ORAL DAILY
COMMUNITY
End: 2018-11-15 | Stop reason: SDUPTHER

## 2018-11-15 RX ORDER — LOSARTAN POTASSIUM 100 MG/1
100 TABLET ORAL DAILY
Qty: 90 TABLET | Refills: 3 | Status: SHIPPED | OUTPATIENT
Start: 2018-11-15 | End: 2018-12-21

## 2018-11-15 NOTE — TELEPHONE ENCOUNTER
----- Message from Candelario Lopez sent at 11/15/2018 10:44 AM CST -----  Contact: Patient [phone]  Patient calling stating just left office and wants to know if the Dr already sent the Rx losartan (COZAAR) 100 MG tablet to the pharmacy for , would like a call from office to inform if patient needs to go straight to pharmacy or wait?    [facility] #[id number] - [place], [place] - [address]    Please call an advise  Thank you

## 2018-11-15 NOTE — PROGRESS NOTES
CHIEF COMPLAINT:  Elevated creatinine.    HISTORY OF PRESENT ILLNESS:  The patient is a 68-year-old gentleman who is   currently being treated for cholangiocarcinoma as well as hypertension,   hyperlipidemia who comes in today for followup of creatinine.  His serum   creatinine went up from 1.1 to 1.38.  He is on a drug protocol in [place].  The   physician there asked that he be taken off of losartan HCT because of the HCT.    The patient stopped losartan HCT one week ago.  He has been on plain losartan   100 mg since.  He does report losing a few pounds.    REVIEW OF SYSTEMS:  The patient reports losing about 2.5 pounds.  No chest pain.    No shortness of breath.  Otherwise, he is feeling good.    PHYSICAL EXAMINATION:  GENERAL APPEARANCE:  No acute distress.  NECK:  Trachea is midline without JVD.  PULMONARY:  Good inspiratory, expiratory breath sounds are heard.  Lungs are   clear to auscultation.  CARDIOVASCULAR:  S1, S2.  EXTREMITIES:  Without edema.  ABDOMEN:  Nontender, nondistended, without hepatosplenomegaly.    ASSESSMENT:  1.  Hypertension, currently stable off of losartan HCT.  2.  Elevated serum creatinine.    PLAN:  We will check a BMP today.  Continue with losartan 100 mg once a day.      PREM/RYNE  dd: 11/15/2018 10:47:02 (CST)  td: 11/16/2018 00:56:09 (CST)  Doc ID   #[id number]  Job ID #[id number]    CC:

## 2018-11-16 ENCOUNTER — TELEPHONE (OUTPATIENT)
Dept: INTERNAL MEDICINE | Facility: CLINIC | Age: 68
End: 2018-11-16

## 2018-11-16 NOTE — TELEPHONE ENCOUNTER
----- Message from Pacheco eByer MD sent at 11/15/2018  7:57 PM CST -----  Please call the patient regarding his abnormal result. Please schedule a renal ultrasound to r/o renal artery stenosis. Please schedule a BMP for Monday. Patient knows that you will be calling.

## 2018-11-16 NOTE — PROGRESS NOTES
Patient was contacted and results of BMP were reviewed. Will decrease Losartan to 50 mg a day, schedule  Ultrasound for DEBRA as well as a repeat BMP.

## 2018-11-16 NOTE — TELEPHONE ENCOUNTER
Pt. Informed. Lab appointment scheduled for Monday at 9 am. If he does not hear from the appointment desk today, he will go to the appointment desk on Monday to schedule the u/s.

## 2018-11-19 ENCOUNTER — TELEPHONE (OUTPATIENT)
Dept: OPHTHALMOLOGY | Facility: CLINIC | Age: 68
End: 2018-11-19

## 2018-11-19 ENCOUNTER — TELEPHONE (OUTPATIENT)
Dept: INTERNAL MEDICINE | Facility: CLINIC | Age: 68
End: 2018-11-19

## 2018-11-19 ENCOUNTER — LAB VISIT (OUTPATIENT)
Dept: LAB | Facility: HOSPITAL | Age: 68
End: 2018-11-19
Payer: MEDICARE

## 2018-11-19 DIAGNOSIS — Z87.442 HISTORY OF KIDNEY STONES: ICD-10-CM

## 2018-11-19 DIAGNOSIS — I10 ESSENTIAL HYPERTENSION: ICD-10-CM

## 2018-11-19 DIAGNOSIS — R79.89 ELEVATED SERUM CREATININE: Primary | ICD-10-CM

## 2018-11-19 DIAGNOSIS — R79.89 ELEVATED SERUM CREATININE: ICD-10-CM

## 2018-11-19 LAB
ANION GAP SERPL CALC-SCNC: 7 MMOL/L
BUN SERPL-MCNC: 28 MG/DL
CALCIUM SERPL-MCNC: 9.5 MG/DL
CHLORIDE SERPL-SCNC: 103 MMOL/L
CO2 SERPL-SCNC: 28 MMOL/L
CREAT SERPL-MCNC: 1.9 MG/DL
EST. GFR  (AFRICAN AMERICAN): 41 ML/MIN/1.73 M^2
EST. GFR  (NON AFRICAN AMERICAN): 35 ML/MIN/1.73 M^2
GLUCOSE SERPL-MCNC: 118 MG/DL
POTASSIUM SERPL-SCNC: 5.4 MMOL/L
SODIUM SERPL-SCNC: 138 MMOL/L

## 2018-11-19 PROCEDURE — 80048 BASIC METABOLIC PNL TOTAL CA: CPT

## 2018-11-19 PROCEDURE — 36415 COLL VENOUS BLD VENIPUNCTURE: CPT

## 2018-11-19 NOTE — TELEPHONE ENCOUNTER
Patient advised to start drops on Saturday.  One drop three times daily into operative eye.  Doing this Saturday, Sunday and Monday. Will call tomorrow with arrival times.

## 2018-11-19 NOTE — TELEPHONE ENCOUNTER
----- Message from Vilma Clark sent at 11/19/2018  1:28 PM CST -----  Contact: Eric Brown   Pt would like to speak with  nurse about the eye drops please for the eye surgery on 11/26/2018 ,pt can be reached at [phone] please thank.

## 2018-11-20 ENCOUNTER — TELEPHONE (OUTPATIENT)
Dept: OPHTHALMOLOGY | Facility: CLINIC | Age: 68
End: 2018-11-20

## 2018-11-20 ENCOUNTER — HOSPITAL ENCOUNTER (OUTPATIENT)
Dept: RADIOLOGY | Facility: HOSPITAL | Age: 68
Discharge: HOME | End: 2018-11-20
Attending: INTERNAL MEDICINE
Payer: MEDICARE

## 2018-11-20 DIAGNOSIS — R10.9 RIGHT FLANK PAIN: ICD-10-CM

## 2018-11-20 DIAGNOSIS — N13.30 HYDRONEPHROSIS OF RIGHT KIDNEY: ICD-10-CM

## 2018-11-20 DIAGNOSIS — R19.00 RETROPERITONEAL MASS: Primary | ICD-10-CM

## 2018-11-20 DIAGNOSIS — R79.89 ELEVATED SERUM CREATININE: ICD-10-CM

## 2018-11-20 DIAGNOSIS — Z87.442 HISTORY OF KIDNEY STONES: ICD-10-CM

## 2018-11-20 PROCEDURE — 74176 CT ABD & PELVIS W/O CONTRAST: CPT | Mod: TC

## 2018-11-20 PROCEDURE — 74176 CT ABD & PELVIS W/O CONTRAST: CPT | Mod: 26,,, | Performed by: RADIOLOGY

## 2018-11-20 NOTE — TELEPHONE ENCOUNTER
----- Message from Bryanna Messina sent at 11/20/2018 10:52 AM CST -----  Contact: karen Brown needs to cancel his surgery that is scheduled for Monday due to medical reasons. He will reschedule later.

## 2018-11-20 NOTE — TELEPHONE ENCOUNTER
Surgery cancelled for 11/26.  Surgery for 12/13 changed to Right Eye.  If health issues not resolved for 12/13 he will call back to cancel.

## 2018-11-21 ENCOUNTER — TELEPHONE (OUTPATIENT)
Dept: INTERNAL MEDICINE | Facility: CLINIC | Age: 68
End: 2018-11-21

## 2018-11-21 ENCOUNTER — OFFICE VISIT (OUTPATIENT)
Dept: UROLOGY | Facility: CLINIC | Age: 68
End: 2018-11-21
Payer: MEDICARE

## 2018-11-21 ENCOUNTER — TELEPHONE (OUTPATIENT)
Dept: UROLOGY | Facility: CLINIC | Age: 68
End: 2018-11-21

## 2018-11-21 ENCOUNTER — ANESTHESIA EVENT (OUTPATIENT)
Dept: SURGERY | Facility: HOSPITAL | Age: 68
End: 2018-11-21
Payer: MEDICARE

## 2018-11-21 ENCOUNTER — TELEPHONE (OUTPATIENT)
Dept: PREADMISSION TESTING | Facility: HOSPITAL | Age: 68
End: 2018-11-21

## 2018-11-21 VITALS
HEIGHT: 69 IN | SYSTOLIC BLOOD PRESSURE: 161 MMHG | DIASTOLIC BLOOD PRESSURE: 87 MMHG | HEART RATE: 64 BPM | WEIGHT: 194 LBS | BODY MASS INDEX: 28.73 KG/M2

## 2018-11-21 DIAGNOSIS — C22.1 CHOLANGIOCARCINOMA: ICD-10-CM

## 2018-11-21 DIAGNOSIS — N13.39 OTHER HYDRONEPHROSIS: Primary | ICD-10-CM

## 2018-11-21 DIAGNOSIS — Z01.818 PREOP TESTING: Primary | ICD-10-CM

## 2018-11-21 PROCEDURE — 99204 OFFICE O/P NEW MOD 45 MIN: CPT | Mod: S$GLB,,, | Performed by: UROLOGY

## 2018-11-21 PROCEDURE — 99999 PR PBB SHADOW E&M-EST. PATIENT-LVL III: CPT | Mod: PBBFAC,,, | Performed by: UROLOGY

## 2018-11-21 RX ORDER — METOPROLOL SUCCINATE 25 MG/1
25 TABLET, EXTENDED RELEASE ORAL DAILY
Qty: 30 TABLET | Refills: 11 | Status: SHIPPED | OUTPATIENT
Start: 2018-11-21 | End: 2018-12-21

## 2018-11-21 NOTE — TELEPHONE ENCOUNTER
Spoke with pt, he was informed to keep lab appt on 11/23/18. Pt understood, verbalized understanding and had no questions.

## 2018-11-21 NOTE — PRE-PROCEDURE INSTRUCTIONS
MD Yisel Escobar RN             I am repeating his BMP on Friday. As it stands today, he is cleared for his procedure.    Previous Messages      ----- Message -----   From: Yisel Diehl RN   Sent: 11/21/2018  12:01 PM   To: Pacheco Beyer MD, [facility]AN Staff     Pt is scheduled for stent replacement 11/27 by dr. Noguera , noted labs done on 19th with some elevations , is pt ok to proceed? Procedure is about 45 minutes.       GUIDO Diehl RN BC   Pre-op anesthesia

## 2018-11-21 NOTE — TELEPHONE ENCOUNTER
----- Message from Yisel Diehl RN sent at 11/21/2018 12:01 PM CST -----  Pt is scheduled for stent replacement 11/27 by dr. Noguera , noted labs done on 19th with some elevations , is pt ok to proceed? Procedure is about 45 minutes.      GUIDO Diehl RN BC  Pre-op anesthesia

## 2018-11-21 NOTE — PRE ADMISSION SCREENING
Anesthesia Assessment: Preoperative EQUATION    Planned Procedure: Procedure(s) (LRB):  REPLACEMENT, STENT (Right)  Requested Anesthesia Type:Monitor Anesthesia Care  Surgeon: Rodger Noguera MD  Service: Urology  Known or anticipated Date of Surgery:11/27/2018    Surgeon notes: reviewed    Electronic QUestionnaire Assessment completed via nurse interview with patient.        No aq        Triage considerations:     The patient has no apparent active cardiac condition (No unstable coronary Syndrome such as severe unstable angina or recent [<1 month] myocardial infarction, decompensated CHF, severe valvular   disease or significant arrhythmia)    Previous anesthesia records:[facility] 11/24/15    Last PCP note: within 1 month , within [facility] Dr. Stoll    Subspecialty notes: Hematology/Oncology, urology/sports medicine// opthmology    Other important co-morbidities:   Htn  HLD  BPH with obstruction  Intrahepatic cholangiocarcinoma  Hx lung cancer  Hx secondary adenocarcinoma of retroperitoneum  Gout        Tests already available:  Available tests,  within 1 month , within [facility] .            Instructions given. (See in Nurse's note)    Optimization:      Medical Opinion Indicated   Plan:    Testing:  none   Pre-anesthesia  visit       Visit focus: none     Consultation:Patient's PCP for a statement of optimization        Navigation:   Consults scheduled.             Results will be tracked by Preop Clinic.

## 2018-11-21 NOTE — PROGRESS NOTES
CHIEF COMPLAINT:    Mr. Brown is a 68 y.o. male presenting with hydro.    PRESENTING ILLNESS:    Eric Brown is a 68 y.o. male with stage 4 cholangiocarcinoma of the liver.  Currently undergoing experimental chemo at [facility].  Recent CT scan shows R hydro due to what appears to be para aortic lymph node compression.  I reviewed the films personally.    He has some new onset R flank pain.  No aggravating or alleviating factors.    No dysuria.  No hematuria.    REVIEW OF SYSTEMS:    Eric Brown denies headache, blurred vision, fever, nausea, vomiting, chills, abdominal pain, bleeding per rectum, cough, SOB, recent loss of consciousness, recent mental status changes, seizures, dizziness, or upper or lower extremity weakness.    BILL  1.   2.   3.   4.   5.       PATIENT HISTORY:    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    s/p resection, followed at [facility]    Kidney stone     Prediabetes     Vitreous floaters        Past Surgical History:   Procedure Laterality Date    CHOLECYSTECTOMY      5-2012    COLONOSCOPY N/A 11/24/2015    Procedure: COLONOSCOPY;  Surgeon: Emma Dhillon MD;  Location: [facility] ([address] [place] [address]);  Service: Endoscopy;  Laterality: N/A;  PMH of Polyps.Had colonoscopy at another facility 3 to 5 yrs ago. EC    COLONOSCOPY N/A 11/24/2015    [facility]    EXTRACORPOREAL SHOCK WAVE LITHOTRIPSY  2007 and 2008    litho      LIVER RESECTION  5-2012    Distal pancreatectomy    LIVER RESECTION  1-22-15    Hepatic cholangiocarcinoma    MITRAL VALVE SURGERY      REMOVAL-PORT-A-CATH N/A 2/22/2016    Performed by [facility]  Diagnostic Provider [facility]       Family History   Problem Relation Age of Onset    Hypertension Mother     Cataracts Father        Social History     Socioeconomic History    Marital status:      Spouse name: Not on file    Number of children: Not on file    Years of education: Not on file    Highest education level: Not on file   Social Needs    Financial resource strain: Not on file    Food insecurity - worry: Not on file    Food insecurity - inability: Not on file    Transportation needs - medical: Not on file    Transportation needs - non-medical: Not on file   Occupational History     Employer: [employer]   Tobacco Use    Smoking status: Former Smoker     Last attempt to quit: 1985     Years since quittin.7   Substance and Sexual Activity    Alcohol use: No    Drug use: Not on file    Sexual activity: Not on file   Other Topics Concern    Not on file   Social History Narrative    Not on file       Allergies:  Iodine and iodide containing products and Iohexol    Medications:    Current Outpatient Medications:     allopurinol (ZYLOPRIM) 100 MG tablet, TAKE 1 TABLET (100 MG TOTAL) BY MOUTH ONCE DAILY., Disp: 90 tablet, Rfl: 3    atorvastatin (LIPITOR) 10 MG tablet, TAKE 1 TABLET (10 MG TOTAL) BY MOUTH ONCE DAILY., Disp: 90 tablet, Rfl: 3    cholecalciferol, vitamin D3, (VITAMIN D3) 1,000 unit capsule, Take 5,000 mg by mouth., Disp: , Rfl:     felodipine (PLENDIL) 10 MG 24 hr tablet, TAKE 1 TABLET BY MOUTH EVERY DAY, Disp: 90 tablet, Rfl: 3    losartan (COZAAR) 100 MG tablet, Take 1 tablet (100 mg total) by mouth once daily., Disp: 90 tablet, Rfl: 3    MULTIVIT-IRON-MIN-FOLIC ACID 3,500-18-0.4 UNIT-MG-MG ORAL CHEW, Take by mouth., Disp: , Rfl:     multivit-min-FA-lycopen-lutein 0.4-300-250 mg-mcg-mcg Tab, Take by mouth., Disp: , Rfl:     omeprazole (PRILOSEC) 20 MG capsule, TAKE 1 CAPSULE (20 MG TOTAL) BY MOUTH ONCE DAILY., Disp: , Rfl:      prednisolon-gatiflox-bromfenac 1-0.5-0.075 % DrpS, Apply 1 drop to eye 3 (three) times daily. in operative eye for 1 month after surgery, Disp: 7 mL, Rfl: 3    predniSONE (DELTASONE) 50 MG Tab, TAKE BY MOUTH 1 TABLET 13 HOURS, 7 HOURS AND 1 HOUR PRIOR TO IMAGING, Disp: , Rfl: 2    tamsulosin (FLOMAX) 0.4 mg Cp24, TAKE 1 CAPSULE (0.4 MG TOTAL) BY MOUTH ONCE DAILY., Disp: 90 capsule, Rfl: 3    PHYSICAL EXAMINATION:    The patient generally appears in good health, is appropriately interactive, and is in no apparent distress.     Eyes: anicteric sclerae, moist conjunctivae; no lid-lag; PERRLA     HENT: Atraumatic; oropharynx clear with moist mucous membranes and no mucosal ulcerations;normal hard and soft palate.  No evidence of lymphadenopathy.    Neck: Trachea midline.  No thyromegaly.    Musculoskeletal: No abnormal gait.    Skin: No lesions.    Mental: Cooperative with normal affect.  Is oriented to time, place, and person.    Neuro: Grossly intact.    Chest: Normal inspiratory effort.   No accessory muscles.  No audible wheezes.  Respirations symmetric on inspiration and expiration.    Heart: Regular rhythm.      Abdomen:  Soft, non-tender. No masses or organomegaly. Bladder is not palpable. No evidence of flank discomfort. No evidence of inguinal hernia.    Genitourinary: The penis has no evidence of plaques or induration. The urethral meatus is normal. The testes, epididymides, and cord structures are normal in size and contour bilaterally. The scrotum is normal in size and contour.      Extremities: No clubbing, cyanosis, or edema      LABS:    UA dipped negative today  Lab Results   Component Value Date    PSA 2.4 02/01/2018    PSA 2.1 02/02/2017    PSA 1.8 11/14/2015       IMPRESSION:    Encounter Diagnoses   Name Primary?    Other hydronephrosis Yes    Cholangiocarcinoma          PLAN:    1. Discussed that he appears to have R hydro due to external compression.  His Cr is elevated.   Discussed JJ stent vs  nephrostomy tube.  Discussed that JJ stents have a higher failure rate compared to nephrostomy tube.  2.  He'd like a JJ stent. I  have explained the risk, benefits, and alternatives of the procedure in detail. The patient voices understanding and all questions have been answered. The patient agrees to proceed as planned.      Copy to:

## 2018-11-21 NOTE — H&P (VIEW-ONLY)
CHIEF COMPLAINT:    Mr. Brown is a 68 y.o. male presenting with hydro.    PRESENTING ILLNESS:    Eric Brown is a 68 y.o. male with stage 4 cholangiocarcinoma of the liver.  Currently undergoing experimental chemo at [facility].  Recent CT scan shows R hydro due to what appears to be para aortic lymph node compression.  I reviewed the films personally.    He has some new onset R flank pain.  No aggravating or alleviating factors.    No dysuria.  No hematuria.    REVIEW OF SYSTEMS:    Eric Brown denies headache, blurred vision, fever, nausea, vomiting, chills, abdominal pain, bleeding per rectum, cough, SOB, recent loss of consciousness, recent mental status changes, seizures, dizziness, or upper or lower extremity weakness.    BILL  1.   2.   3.   4.   5.       PATIENT HISTORY:    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    s/p resection, followed at [facility]    Kidney stone     Prediabetes     Vitreous floaters        Past Surgical History:   Procedure Laterality Date    CHOLECYSTECTOMY      5-2012    COLONOSCOPY N/A 11/24/2015    Procedure: COLONOSCOPY;  Surgeon: Emma Dhillon MD;  Location: [facility] ([address] [place] [address]);  Service: Endoscopy;  Laterality: N/A;  PMH of Polyps.Had colonoscopy at another facility 3 to 5 yrs ago. EC    COLONOSCOPY N/A 11/24/2015    [facility]    EXTRACORPOREAL SHOCK WAVE LITHOTRIPSY  2007 and 2008    litho      LIVER RESECTION  5-2012    Distal pancreatectomy    LIVER RESECTION  1-22-15    Hepatic cholangiocarcinoma    MITRAL VALVE SURGERY      REMOVAL-PORT-A-CATH N/A 2/22/2016    Performed by [facility]  [facility]       Family History   Problem Relation Age of Onset    Hypertension Mother     Cataracts Father        Social History     Socioeconomic History    Marital status:      Spouse name: Not on file    Number of children: Not on file    Years of education: Not on file    Highest education level: Not on file   Social Needs    Financial resource strain: Not on file    Food insecurity - worry: Not on file    Food insecurity - inability: Not on file    Transportation needs - medical: Not on file    Transportation needs - non-medical: Not on file   Occupational History     Employer: [employer]   Tobacco Use    Smoking status: Former Smoker     Last attempt to quit: 1985     Years since quittin.7   Substance and Sexual Activity    Alcohol use: No    Drug use: Not on file    Sexual activity: Not on file   Other Topics Concern    Not on file   Social History Narrative    Not on file       Allergies:  Iodine and iodide containing products and Iohexol    Medications:    Current Outpatient Medications:     allopurinol (ZYLOPRIM) 100 MG tablet, TAKE 1 TABLET (100 MG TOTAL) BY MOUTH ONCE DAILY., Disp: 90 tablet, Rfl: 3    atorvastatin (LIPITOR) 10 MG tablet, TAKE 1 TABLET (10 MG TOTAL) BY MOUTH ONCE DAILY., Disp: 90 tablet, Rfl: 3    cholecalciferol, vitamin D3, (VITAMIN D3) 1,000 unit capsule, Take 5,000 mg by mouth., Disp: , Rfl:     felodipine (PLENDIL) 10 MG 24 hr tablet, TAKE 1 TABLET BY MOUTH EVERY DAY, Disp: 90 tablet, Rfl: 3    losartan (COZAAR) 100 MG tablet, Take 1 tablet (100 mg total) by mouth once daily., Disp: 90 tablet, Rfl: 3    MULTIVIT-IRON-MIN-FOLIC ACID 3,500-18-0.4 UNIT-MG-MG ORAL CHEW, Take by mouth., Disp: , Rfl:     multivit-min-FA-lycopen-lutein 0.4-300-250 mg-mcg-mcg Tab, Take by mouth., Disp: , Rfl:     omeprazole (PRILOSEC) 20 MG capsule, TAKE 1 CAPSULE (20 MG TOTAL) BY MOUTH ONCE DAILY., Disp: , Rfl:      prednisolon-gatiflox-bromfenac 1-0.5-0.075 % DrpS, Apply 1 drop to eye 3 (three) times daily. in operative eye for 1 month after surgery, Disp: 7 mL, Rfl: 3    predniSONE (DELTASONE) 50 MG Tab, TAKE BY MOUTH 1 TABLET 13 HOURS, 7 HOURS AND 1 HOUR PRIOR TO IMAGING, Disp: , Rfl: 2    tamsulosin (FLOMAX) 0.4 mg Cp24, TAKE 1 CAPSULE (0.4 MG TOTAL) BY MOUTH ONCE DAILY., Disp: 90 capsule, Rfl: 3    PHYSICAL EXAMINATION:    The patient generally appears in good health, is appropriately interactive, and is in no apparent distress.     Eyes: anicteric sclerae, moist conjunctivae; no lid-lag; PERRLA     HENT: Atraumatic; oropharynx clear with moist mucous membranes and no mucosal ulcerations;normal hard and soft palate.  No evidence of lymphadenopathy.    Neck: Trachea midline.  No thyromegaly.    Musculoskeletal: No abnormal gait.    Skin: No lesions.    Mental: Cooperative with normal affect.  Is oriented to time, place, and person.    Neuro: Grossly intact.    Chest: Normal inspiratory effort.   No accessory muscles.  No audible wheezes.  Respirations symmetric on inspiration and expiration.    Heart: Regular rhythm.      Abdomen:  Soft, non-tender. No masses or organomegaly. Bladder is not palpable. No evidence of flank discomfort. No evidence of inguinal hernia.    Genitourinary: The penis has no evidence of plaques or induration. The urethral meatus is normal. The testes, epididymides, and cord structures are normal in size and contour bilaterally. The scrotum is normal in size and contour.      Extremities: No clubbing, cyanosis, or edema      LABS:    UA dipped negative today  Lab Results   Component Value Date    PSA 2.4 02/01/2018    PSA 2.1 02/02/2017    PSA 1.8 11/14/2015       IMPRESSION:    Encounter Diagnoses   Name Primary?    Other hydronephrosis Yes    Cholangiocarcinoma          PLAN:    1. Discussed that he appears to have R hydro due to external compression.  His Cr is elevated.   Discussed JJ stent vs  nephrostomy tube.  Discussed that JJ stents have a higher failure rate compared to nephrostomy tube.  2.  He'd like a JJ stent. I  have explained the risk, benefits, and alternatives of the procedure in detail. The patient voices understanding and all questions have been answered. The patient agrees to proceed as planned.      Copy to:

## 2018-11-21 NOTE — TELEPHONE ENCOUNTER
----- Message from iYsel Diehl RN sent at 11/21/2018 12:01 PM CST -----  Pt is scheduled for stent replacement 11/27 by dr. Noguera , noted labs done on 19th with some elevations , is pt ok to proceed? Procedure is about 45 minutes.      GUIDO Diehl RN BC  Pre-op anesthesia

## 2018-11-21 NOTE — TELEPHONE ENCOUNTER
Per Dr. Beyer as of 11/21/2018    I will be repeating a BMP on Friday. As of now, he is cleared to have procedure.

## 2018-11-21 NOTE — ANESTHESIA PREPROCEDURE EVALUATION
Anesthesia Assessment: Preoperative EQUATION     Planned Procedure: Procedure(s) (LRB):  REPLACEMENT, STENT (Right)  Requested Anesthesia Type:Monitor Anesthesia Care  Surgeon: Rodger Noguera MD  Service: Urology  Known or anticipated Date of Surgery:11/27/2018     Surgeon notes: reviewed     Electronic QUestionnaire Assessment completed via nurse interview with patient.         No aq           Triage considerations:      The patient has no apparent active cardiac condition (No unstable coronary Syndrome such as severe unstable angina or recent [<1 month] myocardial infarction, decompensated CHF, severe valvular   disease or significant arrhythmia)     Previous anesthesia records:MAC 11/24/15     Last PCP note: within 1 month , within [facility] Dr[facility]yer     Subspecialty notes: Hematology/Oncology, urology/sports medicine// opthmology     Other important co-morbidities:   Htn  HLD  BPH with obstruction  Intrahepatic cholangiocarcinoma  Hx lung cancer  Hx secondary adenocarcinoma of retroperitoneum  Gout        Tests already available:  Available tests,  within 1 month , within [facility] .                            Instructions given. (See in Nurse's note)     Optimization:      Medical Opinion Indicated   Plan:               Testing:  none   Pre-anesthesia  visit                                        Visit focus: none                           Consultation:Patient's PCP for a statement of optimization                              Navigation:   Consults scheduled.                        Results will be tracked by Preop Clinic.                                                                                                             11/21/2018  Eric Brown is a 68 y.o., male needing a change of stent placed initially due to abdominal component of invasive carcinoma.     Anesthesia Evaluation         Review of Systems  Anesthesia Hx:  No problems with previous Anesthesia History of prior surgery of interest  to airway management or planning: Previous anesthesia: MAC  colonoscopy  11/24/15 with MAC.    Social:  Non-Smoker, Social Alcohol Use    Hematology/Oncology:  Hematology Normal      Current/Recent Cancer. Oncology Comments: Lung cancer  Retroperitoneum cancer  cholangiocarcinoma    EENT/Dental:EENT/Dental Normal   Cardiovascular:   Hypertension hyperlipidemia    Pulmonary:  Pulmonary Normal    Renal/:   Chronic Renal Disease BPH Hydronephrosis  Hx BPH with obstructions   Hepatic/GI:   GERD Liver Disease,    Musculoskeletal:   gout   Neurological:  Neurology Normal    Endocrine:  Endocrine Normal    Dermatological:  Skin Normal    Psych:  Psychiatric Normal           Physical Exam  General:  Well nourished    Airway/Jaw/Neck:  Airway Findings: Mouth Opening: Normal Tongue: Normal  General Airway Assessment: Adult  Mallampati: I  TM Distance: Normal, at least 6 cm      Dental:  Dental Findings: In tact   Chest/Lungs:  Chest/Lungs Findings: Clear to auscultation     Heart/Vascular:  Heart Findings: Rate: Normal  Rhythm: Regular Rhythm  Sounds: Normal        Mental Status:  Mental Status Findings:  Cooperative, Alert and Oriented         Anesthesia Plan  Type of Anesthesia, risks & benefits discussed:  Anesthesia Type:  general  Patient's Preference:   Intra-op Monitoring Plan:   Intra-op Monitoring Plan Comments:   Post Op Pain Control Plan:   Post Op Pain Control Plan Comments:   Induction:   IV  Beta Blocker:  Patient is on a Beta-Blocker and has received one dose within the past 24 hours (No further documentation required).       Informed Consent: Patient representative understands risks and agrees with Anesthesia plan.  Questions answered. Anesthesia consent signed with patient representative.  ASA Score: 3     Day of Surgery Review of History & Physical:            Ready For Surgery From Anesthesia Perspective.

## 2018-11-21 NOTE — TELEPHONE ENCOUNTER
----- Message from Pacheco Beyer MD sent at 11/21/2018  2:09 PM CST -----  Please contact . He needs to keep his appointment Friday for his BMP.

## 2018-11-23 ENCOUNTER — PATIENT MESSAGE (OUTPATIENT)
Dept: INTERNAL MEDICINE | Facility: CLINIC | Age: 68
End: 2018-11-23

## 2018-11-23 ENCOUNTER — HOSPITAL ENCOUNTER (OUTPATIENT)
Dept: CARDIOLOGY | Facility: CLINIC | Age: 68
Discharge: HOME | End: 2018-11-23
Payer: MEDICARE

## 2018-11-23 DIAGNOSIS — Z01.818 PREOP TESTING: ICD-10-CM

## 2018-11-23 PROCEDURE — 93005 ELECTROCARDIOGRAM TRACING: CPT | Mod: S$GLB,,, | Performed by: ANESTHESIOLOGY

## 2018-11-23 PROCEDURE — 93010 ELECTROCARDIOGRAM REPORT: CPT | Mod: S$GLB,,, | Performed by: INTERNAL MEDICINE

## 2018-11-26 ENCOUNTER — TELEPHONE (OUTPATIENT)
Dept: UROLOGY | Facility: CLINIC | Age: 68
End: 2018-11-26

## 2018-11-26 NOTE — TELEPHONE ENCOUNTER
Called pt to confirm arrival time 530am for procedure. Gave pt NPO instructions and gave pt opportunity to ask questions. Pt verbalized understanding.

## 2018-11-27 ENCOUNTER — ANESTHESIA (OUTPATIENT)
Dept: SURGERY | Facility: HOSPITAL | Age: 68
End: 2018-11-27
Payer: MEDICARE

## 2018-11-27 ENCOUNTER — HOSPITAL ENCOUNTER (OUTPATIENT)
Facility: HOSPITAL | Age: 68
Discharge: HOME | End: 2018-11-27
Attending: UROLOGY | Admitting: UROLOGY
Payer: MEDICARE

## 2018-11-27 VITALS
OXYGEN SATURATION: 98 % | DIASTOLIC BLOOD PRESSURE: 70 MMHG | RESPIRATION RATE: 16 BRPM | BODY MASS INDEX: 28.73 KG/M2 | WEIGHT: 194 LBS | HEART RATE: 51 BPM | HEIGHT: 69 IN | TEMPERATURE: 99 F | SYSTOLIC BLOOD PRESSURE: 170 MMHG

## 2018-11-27 DIAGNOSIS — C22.1 INTRAHEPATIC CHOLANGIOCARCINOMA: ICD-10-CM

## 2018-11-27 DIAGNOSIS — C22.1 CHOLANGIOCARCINOMA: ICD-10-CM

## 2018-11-27 DIAGNOSIS — N13.39 OTHER HYDRONEPHROSIS: Primary | ICD-10-CM

## 2018-11-27 DIAGNOSIS — N40.1 BENIGN PROSTATIC HYPERPLASIA WITH URINARY OBSTRUCTION: ICD-10-CM

## 2018-11-27 DIAGNOSIS — E78.5 HYPERLIPIDEMIA, UNSPECIFIED HYPERLIPIDEMIA TYPE: ICD-10-CM

## 2018-11-27 DIAGNOSIS — N13.8 BENIGN PROSTATIC HYPERPLASIA WITH URINARY OBSTRUCTION: ICD-10-CM

## 2018-11-27 DIAGNOSIS — I10 ESSENTIAL HYPERTENSION: ICD-10-CM

## 2018-11-27 DIAGNOSIS — R31.9 HEMATURIA, UNSPECIFIED TYPE: ICD-10-CM

## 2018-11-27 DIAGNOSIS — C78.00 MALIGNANT NEOPLASM METASTATIC TO LUNG, UNSPECIFIED LATERALITY: ICD-10-CM

## 2018-11-27 PROCEDURE — D9220A PRA ANESTHESIA: Mod: ANES,,, | Performed by: ANESTHESIOLOGY

## 2018-11-27 PROCEDURE — 37000009 HC ANESTHESIA EA ADD 15 MINS: Performed by: UROLOGY

## 2018-11-27 PROCEDURE — 36000707: Performed by: UROLOGY

## 2018-11-27 PROCEDURE — 88305 TISSUE EXAM BY PATHOLOGIST: CPT | Mod: 26,,, | Performed by: PATHOLOGY

## 2018-11-27 PROCEDURE — 37000008 HC ANESTHESIA 1ST 15 MINUTES: Performed by: UROLOGY

## 2018-11-27 PROCEDURE — 76000 FLUOROSCOPY <1 HR PHYS/QHP: CPT | Mod: 26,59,, | Performed by: UROLOGY

## 2018-11-27 PROCEDURE — 36000706: Performed by: UROLOGY

## 2018-11-27 PROCEDURE — 52204 CYSTOSCOPY W/BIOPSY(S): CPT | Mod: 59,,, | Performed by: UROLOGY

## 2018-11-27 PROCEDURE — 88305 TISSUE EXAM BY PATHOLOGIST: CPT | Performed by: PATHOLOGY

## 2018-11-27 PROCEDURE — 25000003 PHARM REV CODE 250: Performed by: STUDENT IN AN ORGANIZED HEALTH CARE EDUCATION/TRAINING PROGRAM

## 2018-11-27 PROCEDURE — 71000044 HC DOSC ROUTINE RECOVERY FIRST HOUR: Performed by: UROLOGY

## 2018-11-27 PROCEDURE — D9220A PRA ANESTHESIA: Mod: CRNA,,, | Performed by: NURSE ANESTHETIST, CERTIFIED REGISTERED

## 2018-11-27 PROCEDURE — 63600175 PHARM REV CODE 636 W HCPCS: Performed by: NURSE ANESTHETIST, CERTIFIED REGISTERED

## 2018-11-27 PROCEDURE — 63600175 PHARM REV CODE 636 W HCPCS: Performed by: STUDENT IN AN ORGANIZED HEALTH CARE EDUCATION/TRAINING PROGRAM

## 2018-11-27 PROCEDURE — 52332 CYSTOSCOPY AND TREATMENT: CPT | Mod: RT,,, | Performed by: UROLOGY

## 2018-11-27 PROCEDURE — C2617 STENT, NON-COR, TEM W/O DEL: HCPCS | Performed by: UROLOGY

## 2018-11-27 PROCEDURE — 71000015 HC POSTOP RECOV 1ST HR: Performed by: UROLOGY

## 2018-11-27 DEVICE — STENT URETERAL UNIV 6FR 26CM: Type: IMPLANTABLE DEVICE | Site: URETER | Status: FUNCTIONAL

## 2018-11-27 RX ORDER — TRAMADOL HYDROCHLORIDE 50 MG/1
50 TABLET ORAL EVERY 6 HOURS PRN
Qty: 5 TABLET | Refills: 0 | Status: SHIPPED | OUTPATIENT
Start: 2018-11-27 | End: 2018-12-07

## 2018-11-27 RX ORDER — HYDROCODONE BITARTRATE AND ACETAMINOPHEN 5; 325 MG/1; MG/1
1 TABLET ORAL EVERY 4 HOURS PRN
Status: DISCONTINUED | OUTPATIENT
Start: 2018-11-27 | End: 2018-11-27 | Stop reason: HOSPADM

## 2018-11-27 RX ORDER — PROPOFOL 10 MG/ML
VIAL (ML) INTRAVENOUS CONTINUOUS PRN
Status: DISCONTINUED | OUTPATIENT
Start: 2018-11-27 | End: 2018-11-27

## 2018-11-27 RX ORDER — LIDOCAINE HCL/PF 100 MG/5ML
SYRINGE (ML) INTRAVENOUS
Status: DISCONTINUED | OUTPATIENT
Start: 2018-11-27 | End: 2018-11-27

## 2018-11-27 RX ORDER — LIDOCAINE HYDROCHLORIDE 10 MG/ML
1 INJECTION, SOLUTION EPIDURAL; INFILTRATION; INTRACAUDAL; PERINEURAL ONCE
Status: COMPLETED | OUTPATIENT
Start: 2018-11-27 | End: 2018-11-27

## 2018-11-27 RX ORDER — SODIUM CHLORIDE 9 MG/ML
INJECTION, SOLUTION INTRAVENOUS CONTINUOUS
Status: DISCONTINUED | OUTPATIENT
Start: 2018-11-27 | End: 2018-11-27 | Stop reason: HOSPADM

## 2018-11-27 RX ORDER — MIDAZOLAM HYDROCHLORIDE 1 MG/ML
INJECTION, SOLUTION INTRAMUSCULAR; INTRAVENOUS
Status: DISCONTINUED | OUTPATIENT
Start: 2018-11-27 | End: 2018-11-27

## 2018-11-27 RX ORDER — FENTANYL CITRATE 50 UG/ML
INJECTION, SOLUTION INTRAMUSCULAR; INTRAVENOUS
Status: DISCONTINUED | OUTPATIENT
Start: 2018-11-27 | End: 2018-11-27

## 2018-11-27 RX ORDER — PROPOFOL 10 MG/ML
VIAL (ML) INTRAVENOUS
Status: DISCONTINUED | OUTPATIENT
Start: 2018-11-27 | End: 2018-11-27

## 2018-11-27 RX ORDER — TAMSULOSIN HYDROCHLORIDE 0.4 MG/1
0.4 CAPSULE ORAL DAILY
Qty: 30 CAPSULE | Refills: 0 | Status: SHIPPED | OUTPATIENT
Start: 2018-11-27 | End: 2019-02-01 | Stop reason: SDUPTHER

## 2018-11-27 RX ORDER — ONDANSETRON 8 MG/1
8 TABLET, ORALLY DISINTEGRATING ORAL EVERY 8 HOURS PRN
Status: DISCONTINUED | OUTPATIENT
Start: 2018-11-27 | End: 2018-11-27 | Stop reason: HOSPADM

## 2018-11-27 RX ORDER — CEFAZOLIN SODIUM 1 G/3ML
2 INJECTION, POWDER, FOR SOLUTION INTRAMUSCULAR; INTRAVENOUS
Status: COMPLETED | OUTPATIENT
Start: 2018-11-27 | End: 2018-11-27

## 2018-11-27 RX ORDER — SODIUM CHLORIDE 0.9 % (FLUSH) 0.9 %
3 SYRINGE (ML) INJECTION
Status: DISCONTINUED | OUTPATIENT
Start: 2018-11-27 | End: 2018-11-27 | Stop reason: HOSPADM

## 2018-11-27 RX ADMIN — LIDOCAINE HYDROCHLORIDE 0.2 MG: 10 INJECTION, SOLUTION EPIDURAL; INFILTRATION; INTRACAUDAL; PERINEURAL at 06:11

## 2018-11-27 RX ADMIN — CEFAZOLIN 2 G: 330 INJECTION, POWDER, FOR SOLUTION INTRAMUSCULAR; INTRAVENOUS at 06:11

## 2018-11-27 RX ADMIN — LIDOCAINE HYDROCHLORIDE 100 MG: 20 INJECTION, SOLUTION INTRAVENOUS at 06:11

## 2018-11-27 RX ADMIN — PROPOFOL 100 MCG/KG/MIN: 10 INJECTION, EMULSION INTRAVENOUS at 06:11

## 2018-11-27 RX ADMIN — FENTANYL CITRATE 25 MCG: 50 INJECTION, SOLUTION INTRAMUSCULAR; INTRAVENOUS at 07:11

## 2018-11-27 RX ADMIN — MIDAZOLAM HYDROCHLORIDE 2 MG: 1 INJECTION, SOLUTION INTRAMUSCULAR; INTRAVENOUS at 06:11

## 2018-11-27 RX ADMIN — SODIUM CHLORIDE: 0.9 INJECTION, SOLUTION INTRAVENOUS at 06:11

## 2018-11-27 RX ADMIN — FENTANYL CITRATE 25 MCG: 50 INJECTION, SOLUTION INTRAMUSCULAR; INTRAVENOUS at 06:11

## 2018-11-27 RX ADMIN — PROPOFOL 30 MG: 10 INJECTION, EMULSION INTRAVENOUS at 06:11

## 2018-11-27 NOTE — DISCHARGE INSTRUCTIONS
Cystoscopy    Cystoscopy is a procedure that lets your doctor look directly inside your urethra and bladder. It can be used to:  · Help diagnose a problem with your urethra, bladder, or kidneys.  · Take a sample (biopsy) of bladder or urethral tissue.  · Treat certain problems (such as removing kidney stones).  · Place a stent to bypass an obstruction.  · Take special X-rays of the kidneys.  Based on the findings, your doctor may recommend other tests or treatments.  What is a cystoscope?  A cystoscope is a telescope-like instrument that contains lenses and fiberoptics (small glass wires that make bright light). The cystoscope may be straight and rigid, or flexible to bend around curves in the urethra. The doctor may look directly into the cystoscope, or project the image onto a monitor.  Getting ready  · Ask your doctor if you should stop taking any medicines before the procedure.  · Ask whether you should avoid eating or drinking anything after midnight before the procedure.  · Follow any other instructions your doctor gives you.  Tell your doctor before the exam if you:  · Take any medicines, such as aspirin or blood thinners  · Have allergies to any medicines  · Are pregnant   The procedure  Cystoscopy is done in the doctors office, surgery center, or hospital. The doctor and a nurse are present during the procedure. It takes only a few minutes, longer if a biopsy, X-ray, or treatment needs to be done.  During the procedure:  · You lie on an exam table on your back, knees bent and legs apart. You are covered with a drape.  · Your urethra and the area around it are washed. Anesthetic jelly may be applied to numb the urethra. Other pain medicine is usually not needed. In some cases, you may be offered a mild sedative to help you relax. If a more extensive procedure is to be done, such as a biopsy or kidney stone removal, general anesthesia may be needed.  · The cystoscope is inserted. A sterile fluid is put  into the bladder to expand it. You may feel pressure from this fluid.  · When the procedure is done, the cystoscope is removed.  After the procedure  If you had a sedative, general anesthesia, or spinal anesthesia, you must have someone drive you home. Once youre home:  · Drink plenty of fluids.  · You may have burning or light bleeding when you urinate--this is normal.  · Medicines may be prescribed to ease any discomfort or prevent infection. Take these as directed.  · Call your doctor if you have heavy bleeding or blood clots, burning that lasts more than a day, a fever over 100°F  (38° C), or trouble urinating.  Date Last Reviewed: 1/1/2017 © 2000-2017 [employer]. [address] [place] [address], [place] [address]. All rights reserved. This information is not intended as a substitute for professional medical care. Always follow your healthcare professional's instructions.        Ureteral Stents  A ureteral stent is a soft plastic tube with holes in it. Its temporarily inserted into a ureter to help drain urine into the bladder. One end goes in the kidney. The other end goes in the bladder. A coil on each end holds the stent in place. The stent cant be seen from outside the body. It shouldnt interfere with your normal routine. Your stent will be put in by a doctor trained in treating the urinary tract (a urologist) or another specialist. The procedure is done in a hospital or surgery center. Youll likely go home the same day.  When is a ureteral stent used?  A ureteral stent may be used:  · To bypass a blockage in a kidney or ureter.  · During kidney stone removal.  · To let a ureter heal after surgery.    Before the Procedure  Your healthcare provider will give you instructions to prepare for the procedure. X-rays or other imaging tests of your kidneys and ureters may be done beforehand.  During the procedure  · You receive medicine to prevent pain and help you relax or sleep during the procedure.  Once this takes effect, the procedure starts.  · The doctor inserts a cystoscope (lighted instrument) through the urethra and into the bladder. This shows the opening to the ureter.  · A thin wire is carefully threaded through the cystoscope, up the ureter, and into the kidney. The stent is inserted over the wire.  · A fluoroscope (special X-ray machine) is used to help position the stent. When the stent is in place, the wire and cystoscope are removed.  While you have a stent  · Some discomfort is normal. Certain movements may trigger pain or a feeling that you need to urinate. You may also feel mild soreness or pressure before or during urination. These symptoms will go away a few days after the stent is removed.  · Medicine to control pain or bladder spasms or to prevent infection may be prescribed. Take this as directed.  · Drink plenty of fluids to help flush out your urinary tract.  · Your urine may be slightly pink or red. This is due to bleeding caused by minor irritation from the stent. This may happen on and off while you have the stent.  · As with any synthetic device placed in the body, there is a risk of infection. The stent may have to be removed if this happens.   How long will you need a stent?  The stent is often taken out after the blockage in the ureter is treated or the ureter has healed. This may take 1 week to 2 weeks, or longer. If a stent is needed for a long time, it may need to be changed every few months.  When to call your healthcare provider  Contact your healthcare provider right away if:  · Your urine contains blood clots or you see a large amount of blood-tinged urine  · You have symptoms similar to those you had before the stent was placed  · You constantly leak urine  · You have a fever over 100.4°F (38°C), chills, nausea, or vomiting  · Your pain is not relieved with medicine  · The end of the stent comes out of the urethra   Date Last Reviewed: 1/1/2017  © 0731-7642 [employer]. [address] [place] [address]. All rights reserved. This information is not intended as a substitute for professional medical care. Always follow your healthcare professional's instructions.        Anesthesia: General Anesthesia     You are watched continuously during your procedure by your anesthesia provider.     Youre due to have surgery. During surgery, youll be given medicine called anesthesia or anesthetic. This will keep you comfortable and pain-free. Your anesthesia provider will use general anesthesia.  What is general anesthesia?  General anesthesia puts you into a state like deep sleep. It goes into the bloodstream (IV anesthetics), into the lungs (gas anesthetics), or both. You feel nothing during the procedure. You will not remember it. During the procedure, the anesthesia provider monitors you continuously. He or she checks your heart rate and rhythm, blood pressure, breathing, and blood oxygen.  · IV anesthetics. IV anesthetics are given through an IV line in your arm. Theyre often given first. This is so you are asleep before a gas anesthetic is started. Some kinds of IV anesthetics relieve pain. Others relax you. Your doctor will decide which kind is best in your case.  · Gas anesthetics. Gas anesthetics are breathed into the lungs. They are often used to keep you asleep. They can be given through a facemask or a tube placed in your larynx or trachea (breathing tube).  ¨ If you have a facemask, your anesthesia provider will most likely place it over your nose and mouth while youre still awake. Youll breathe oxygen through the mask as your IV anesthetic is started. Gas anesthetic may be added through the mask.  ¨ If you have a tube in the larynx or trachea, it will be inserted into your throat after youre asleep.  Anesthesia tools and medicines  You will likely have:  · IV anesthetics. These are put into an IV line into your bloodstream.  · Gas anesthetics. You breathe  these anesthetics into your lungs, where they pass into your bloodstream.  · Pulse oximeter. This is a small clip that is attached to the end of your finger. This measures your blood oxygen level.  · Electrocardiography leads (electrodes). These are small sticky pads that are placed on your chest. They record your heart rate and rhythm.  · Blood pressure cuff. This reads your blood pressure.  Risks and possible complications  General anesthesia has some risks. These include:  · Breathing problems  · Nausea and vomiting  · Sore throat or hoarseness (usually temporary)  · Allergic reaction to the anesthetic  · Irregular heartbeat (rare)  · Cardiac arrest (rare)   Anesthesia safety  · Follow all instructions you are given for how long not to eat or drink before your procedure.  · Be sure your doctor knows what medicines and drugs you take. This includes over-the-counter medicines, herbs, supplements, alcohol or other drugs. You will be asked when those were last taken.  · Have an adult family member or friend drive you home after the procedure.  · For the first 24 hours after your surgery:  ¨ Do not drive or use heavy equipment.  ¨ Do not make important decisions or sign legal documents. If important decisions or signing legal documents is necessary during the first 24 hours after surgery, have a trusted family member or spouse act on your behalf.  ¨ Avoid alcohol.  ¨ Have a responsible adult stay with you. He or she can watch for problems and help keep you safe.  Date Last Reviewed: 12/1/2016  © 5714-2371 [employer]. [address] [place] [address], [place] [address]. All rights reserved. This information is not intended as a substitute for professional medical care. Always follow your healthcare professional's instructions.

## 2018-11-27 NOTE — TRANSFER OF CARE
"Anesthesia Transfer of Care Note    Patient: Eric Brown    Procedure(s) Performed: Procedure(s) (LRB):  PLACEMENT-STENT (Right)  CYSTOSCOPY  BIOPSY, BLADDER (N/A)    Patient location: PACU    Anesthesia Type: MAC    Transport from OR: Transported from OR on 6-10 L/min O2 by face mask with adequate spontaneous ventilation    Post pain: adequate analgesia    Post assessment: no apparent anesthetic complications    Post vital signs: stable    Level of consciousness: responds to stimulation    Nausea/Vomiting: no nausea/vomiting    Complications: none    Transfer of care protocol was followed      Last vitals:   Visit Vitals  BP (!) 171/79   Pulse 60   Temp 37.1 °C (98.8 °F)   Resp 16   Ht 5' 9" (1.753 m)   Wt 88 kg (194 lb)   SpO2 97%   BMI 28.65 kg/m²     "

## 2018-11-27 NOTE — INTERVAL H&P NOTE
The patient has been examined and the H&P has been reviewed:    I concur with the findings and no changes have occurred since H&P was written.     Urine dipstick today negative for all components    Anesthesia/Surgery risks, benefits and alternative options discussed and understood by patient/family.          Active Hospital Problems    Diagnosis  POA    Other hydronephrosis [N13.39]  Yes      Resolved Hospital Problems   No resolved problems to display.

## 2018-11-27 NOTE — PLAN OF CARE
Patient tolerated procedure and vital signs remain stable.  Patient reports minimal pain, denies nausea or vomiting, and tolerating PO.  Patient able to urinate and voided  100 ML. Patient given written and verbal discharge instructions.  Patient verbalizes understanding of all instructions.

## 2018-11-27 NOTE — DISCHARGE SUMMARY
[facility]  Discharge Note  Short Stay    Admit Date: 11/27/2018    Discharge Date and Time: 11/27/2018 7:29 AM      Attending Physician: Rodger Noguera MD     Discharge Provider: Pool Humphrey    Diagnoses:  Active Hospital Problems    Diagnosis  POA    *Other hydronephrosis [N13.39]  Yes    Secondary adenocarcinoma of retroperitoneum [C78.6]  Yes    Secondary lung cancer [C78.00]  Yes    Cholangiocarcinoma [C22.1]  Yes    Benign prostatic hyperplasia with urinary obstruction [N40.1, N13.8]  Yes    Hypertension [I10]  Yes    Hyperlipidemia [E78.5]  Yes    Intrahepatic cholangiocarcinoma [C22.1]  Yes     s/p resection, followed at [facility]        Resolved Hospital Problems   No resolved problems to display.       Discharged Condition: good    Hospital Course: Patient was admitted for cystoscopy, biopsy and fulguration of bladder tumor, and placement of right JJ ureteral stent and tolerated the procedure well with no complications. The patient was discharged home in good condition on the same day.       Final Diagnoses: Same as principal problem.    Disposition: Home or Self Care    Follow up/Patient Instructions:    Medications:  Reconciled Home Medications:   Current Discharge Medication List      START taking these medications    Details   !! tamsulosin (FLOMAX) 0.4 mg Cap Take 1 capsule (0.4 mg total) by mouth once daily.  Qty: 30 capsule, Refills: 0      traMADol (ULTRAM) 50 mg tablet Take 1 tablet (50 mg total) by mouth every 6 (six) hours as needed for Pain.  Qty: 5 tablet, Refills: 0       !! - Potential duplicate medications found. Please discuss with provider.      CONTINUE these medications which have NOT CHANGED    Details   allopurinol (ZYLOPRIM) 100 MG tablet TAKE 1 TABLET (100 MG TOTAL) BY MOUTH ONCE DAILY.  Qty: 90 tablet, Refills: 3      atorvastatin (LIPITOR) 10 MG tablet TAKE 1 TABLET (10 MG TOTAL) BY MOUTH ONCE DAILY.  Qty: 90 tablet, Refills: 3      cholecalciferol, vitamin  D3, (VITAMIN D3) 1,000 unit capsule Take 5,000 mg by mouth.      felodipine (PLENDIL) 10 MG 24 hr tablet TAKE 1 TABLET BY MOUTH EVERY DAY  Qty: 90 tablet, Refills: 3      metoprolol succinate (TOPROL-XL) 25 MG 24 hr tablet Take 1 tablet (25 mg total) by mouth once daily.  Qty: 30 tablet, Refills: 11      MULTIVIT-IRON-MIN-FOLIC ACID 3,500-18-0.4 UNIT-MG-MG ORAL CHEW Take by mouth.      multivit-min-FA-lycopen-lutein 0.4-300-250 mg-mcg-mcg Tab Take by mouth.      omeprazole (PRILOSEC) 20 MG capsule TAKE 1 CAPSULE (20 MG TOTAL) BY MOUTH ONCE DAILY.      !! tamsulosin (FLOMAX) 0.4 mg Cp24 TAKE 1 CAPSULE (0.4 MG TOTAL) BY MOUTH ONCE DAILY.  Qty: 90 capsule, Refills: 3    Associated Diagnoses: Benign prostatic hyperplasia with urinary obstruction      FLUZONE HIGH-DOSE 2018-19, PF, 180 mcg/0.5 mL vaccine       losartan (COZAAR) 100 MG tablet Take 1 tablet (100 mg total) by mouth once daily.  Qty: 90 tablet, Refills: 3      predniSONE (DELTASONE) 50 MG Tab TAKE BY MOUTH 1 TABLET 13 HOURS, 7 HOURS AND 1 HOUR PRIOR TO IMAGING  Refills: 2       !! - Potential duplicate medications found. Please discuss with provider.        Discharge Procedure Orders   Diet general     Call MD for:  temperature >100.4     Call MD for:  persistent nausea and vomiting     Call MD for:  severe uncontrolled pain     Call MD for:  difficulty breathing, headache or visual disturbances     Call MD for:  hives     Call MD for:  persistent dizziness or light-headedness     Call MD for:  extreme fatigue     Follow-up Information     Rodger Noguera MD In 2 weeks.    Specialty:  Urology  Why:  to review pathology  Contact information:  [address] joslyn  [facility] [address] [phone]

## 2018-11-27 NOTE — OP NOTE
[facility] Note    Date: 11/27/2018    Pre-Op Diagnosis: Right hydronephrosis and ureteral obstruction due to retroperitoneal lymphadenopathy    Patient Active Problem List    Diagnosis Date Noted    Other hydronephrosis 11/27/2018    Left hip pain 01/31/2018    Secondary adenocarcinoma of retroperitoneum 01/29/2018    Secondary lung cancer 06/05/2017    Right shoulder pain 11/11/2016    Arm numbness 11/20/2015    Cholangiocarcinoma 02/23/2015    Hypertension     Hyperlipidemia     Benign prostatic hyperplasia with urinary obstruction     Gout, chronic     Prediabetes     Intrahepatic cholangiocarcinoma 05/02/2012     Op Diagnosis: same, Bladder tumor    Procedure(s) Performed:    1. Cystoscopy with right ureteral JJ stent placement  2. Bladder biopsy   3. Fluoroscopy < 1 h    Specimen(s): Bladder tumor (cold cup biopsy)    Staff Surgeon: Rodger Noguera MD    Assistant Surgeon: Pool Humphrey MD, Debby Zimmerman MD    Anesthesia: Monitored Local Anesthesia with Sedation    Indications: Eric Brown is a 68 y.o. male with right hydronephrosis and ureteral obstruction due to retroperitoneal lymphadenopathy form cholangiocarcinoma.    Findings:    Papillary, low grade appearing, bladder mass visualized medial and posterior to the left ureteral orifice, decision was made to take a cold cup biopsy   Bilateral ureteral orifices in normal anatomic position, no other masses noted  6 [place] x 26 cm right JJ ureteral stent without strings placed    Estimated Blood Loss: min    Drains:  6 [place] x 26 cm right JJ ureteral stent without strings    Procedure in Detail:  After risks, benefits and possible complications of the procedure were explained, the patient elected to undergo the procedure and informed consent was obtained. All questions were answered in the eligio-operative area. The patient was transferred to the cystoscopy suite and placed on the fluoroscopy table in the supine position.   SCDs were applied and working. Time out was performed, eligio-procedural antibiotics were given. Anesthesia was administered.  After adequate anesthesia the patient was placed in dorsal lithotomy position and prepped and draped in the usual sterile fashion.     A rigid cystoscope in a 22 Fr sheath was introduced into the patients bladder per urethra. This passed easily.  The entire urethra was visualized and revealed no strictures or masses.  Cystoscopy was performed which showed the right and left ureteral orifices in the normal anatomic position.  There were was a papillary, low grade appearing, bladder mass visualized medial and posterior to the left ureteral orifice, no trabeculations, and no stones.      Our attention was turned to the patient's right ureteral orifice.  A guide wire was advanced up the right ureteral orifice and resistance was encountered at the level of the proximal ureter, the guide wire was exchanged for an angle tip glide wire over a 5 Fr ureteral catheter. The angle tip glide wire passed easily to the level of the expected renal pelvis. This was confirmed using fluoroscopy.     We then passed a 6 Fr x 26 cm JJ ureteral stent without strings over the wire to the level of the renal pelvis under direct vision as well as flouroscopy. The guide wire was removed.  A 180 degree coil was observed in the renal pelvis as well as the bladder using fluoroscopy.  A 180 degree coil was also seen using direct visualization in the bladder.     We then turned our attention to the bladder mass, cold cup biopsy forceps were introduced and several biopsies were taken from the base of the bladder mass. The bleeding areas were fulgurated using bugbee electrocautery and hemostasis was achieved.     The bladder was drained and the cystoscope was removed.     The patient tolerated the procedure well and was transferred to the recovery room in stable condition.      Disposition: The patient will follow up with   [facility] in 2 weeks to review pathology.     Pool Humphrey MD

## 2018-11-28 NOTE — ANESTHESIA POSTPROCEDURE EVALUATION
"Anesthesia Post Evaluation    Patient: Eric Brown    Procedure(s) Performed: Procedure(s) (LRB):  PLACEMENT-STENT (Right)  CYSTOSCOPY  BIOPSY, BLADDER (N/A)    Final Anesthesia Type: general  Patient location during evaluation: PACU  Patient participation: Yes- Able to Participate  Level of consciousness: awake and alert  Post-procedure vital signs: reviewed and stable  Pain management: adequate  Airway patency: patent  PONV status at discharge: No PONV  Anesthetic complications: no      Cardiovascular status: blood pressure returned to baseline  Respiratory status: unassisted  Hydration status: euvolemic  Follow-up not needed.        Visit Vitals  BP (!) 170/70   Pulse (!) 51   Temp 37 °C (98.6 °F)   Resp 16   Ht 5' 9" (1.753 m)   Wt 88 kg (194 lb)   SpO2 98%   BMI 28.65 kg/m²       Pain/Michael Score: Pain Assessment Performed: Yes (11/27/2018  8:20 AM)  Presence of Pain: denies (11/27/2018  8:20 AM)  Michael Score: 10 (11/27/2018  8:27 AM)        "

## 2018-12-06 ENCOUNTER — TELEPHONE (OUTPATIENT)
Dept: UROLOGY | Facility: CLINIC | Age: 68
End: 2018-12-06

## 2018-12-07 ENCOUNTER — TELEPHONE (OUTPATIENT)
Dept: INTERNAL MEDICINE | Facility: CLINIC | Age: 68
End: 2018-12-07

## 2018-12-07 NOTE — TELEPHONE ENCOUNTER
----- Message from Pacheco Beyer MD sent at 12/6/2018  5:25 PM CST -----  Please schedule a follow up appointment with me for his blood pressure.

## 2018-12-10 ENCOUNTER — TELEPHONE (OUTPATIENT)
Dept: OPHTHALMOLOGY | Facility: CLINIC | Age: 68
End: 2018-12-10

## 2018-12-10 NOTE — TELEPHONE ENCOUNTER
I called to give patient arrival time for surgery scheduled on 12/17.  Patient expressed that he will need to reschedule this surgery to some time in the spring as he is having some other health issues.  He will call back at a later date to arrange.

## 2018-12-21 ENCOUNTER — OFFICE VISIT (OUTPATIENT)
Dept: INTERNAL MEDICINE | Facility: CLINIC | Age: 68
End: 2018-12-21
Payer: MEDICARE

## 2018-12-21 VITALS
DIASTOLIC BLOOD PRESSURE: 82 MMHG | WEIGHT: 193.56 LBS | TEMPERATURE: 98 F | BODY MASS INDEX: 28.67 KG/M2 | SYSTOLIC BLOOD PRESSURE: 164 MMHG | HEIGHT: 69 IN | OXYGEN SATURATION: 97 % | HEART RATE: 66 BPM

## 2018-12-21 DIAGNOSIS — I10 ESSENTIAL HYPERTENSION: Primary | ICD-10-CM

## 2018-12-21 PROCEDURE — 99213 OFFICE O/P EST LOW 20 MIN: CPT | Mod: S$GLB,,, | Performed by: INTERNAL MEDICINE

## 2018-12-21 PROCEDURE — 99999 PR PBB SHADOW E&M-EST. PATIENT-LVL III: CPT | Mod: PBBFAC,,, | Performed by: INTERNAL MEDICINE

## 2018-12-21 RX ORDER — METOPROLOL SUCCINATE 50 MG/1
50 TABLET, EXTENDED RELEASE ORAL DAILY
Qty: 30 TABLET | Refills: 11 | Status: SHIPPED | OUTPATIENT
Start: 2018-12-21 | End: 2019-02-01

## 2018-12-22 NOTE — PROGRESS NOTES
CHIEF COMPLAINT:  Followup of blood pressure.    HISTORY OF PRESENT ILLNESS:  The patient is a 68-year-old gentleman who is   currently being treated for cholangiocarcinoma at [facility], who comes in   today for followup of blood pressure.  The patient had an increase in his serum   creatinine.  A renal stone study was performed, which showed a mild right   hydronephrosis along with enlargement of the right kidney and mild perinephric   fat stranding.  He had right periaortic lymphadenopathy, which appeared to be   causing the mid ureter to be obstructed.  The patient was seen by Dr. Noguera.    A right ureteral JJ stent was placed.  The patient has been seen back in [facility].  He has been marked for radiation therapy, which is to start on   January 2nd, is to be for five days a week for two weeks.  The patient did have   blood test done at [facility].  His creatinine was still elevated at 1.53.  The   last time it was measured here, it was 1.7.  The patient's blood pressure was   also found to be elevated.  The patient was taken off of losartan.  He is now on   metoprolol.    REVIEW OF SYSTEMS:  The patient reports a little dizziness.  Otherwise, he is   feeling well.    PHYSICAL EXAMINATION:  GENERAL APPEARANCE:  No acute distress.  PULMONARY:  Good inspiratory, expiratory breath sounds were heard.  Lungs are   clear to auscultation.  CARDIOVASCULAR:  S1, S2.  EXTREMITIES:  With trace edema.  ABDOMEN:  Nontender, nondistended, without hepatosplenomegaly.    COMMENTS:  Did discuss with the patient that I would like to increase his   beta-blocker.  He does check his blood pressure at home.  We will repeat a BMP   on Monday to see if his creatinine trends down.    ASSESSMENT:  1.  Hypertension.  2.  Elevated serum creatinine.    PLAN:  We will check a BMP in three to four days.  We will see how the patient   responds to increase in metoprolol.      JDS/HN  dd: 12/22/2018 05:11:10 (CST)  td: 12/22/2018  08:36:44 ([facility])  Doc ID   #[id number]  Job ID #[id number]    CC:

## 2018-12-23 ENCOUNTER — PATIENT MESSAGE (OUTPATIENT)
Dept: INTERNAL MEDICINE | Facility: CLINIC | Age: 68
End: 2018-12-23

## 2018-12-24 ENCOUNTER — LAB VISIT (OUTPATIENT)
Dept: LAB | Facility: HOSPITAL | Age: 68
End: 2018-12-24
Attending: INTERNAL MEDICINE
Payer: MEDICARE

## 2018-12-24 DIAGNOSIS — I10 ESSENTIAL HYPERTENSION: ICD-10-CM

## 2018-12-24 LAB
ANION GAP SERPL CALC-SCNC: 8 MMOL/L
BUN SERPL-MCNC: 20 MG/DL
CALCIUM SERPL-MCNC: 9.2 MG/DL
CHLORIDE SERPL-SCNC: 104 MMOL/L
CO2 SERPL-SCNC: 28 MMOL/L
CREAT SERPL-MCNC: 1.6 MG/DL
EST. GFR  (AFRICAN AMERICAN): 50 ML/MIN/1.73 M^2
EST. GFR  (NON AFRICAN AMERICAN): 43 ML/MIN/1.73 M^2
GLUCOSE SERPL-MCNC: 155 MG/DL
POTASSIUM SERPL-SCNC: 4.5 MMOL/L
SODIUM SERPL-SCNC: 140 MMOL/L

## 2018-12-24 PROCEDURE — 80048 BASIC METABOLIC PNL TOTAL CA: CPT

## 2018-12-24 PROCEDURE — 36415 COLL VENOUS BLD VENIPUNCTURE: CPT

## 2018-12-26 ENCOUNTER — TELEPHONE (OUTPATIENT)
Dept: INTERNAL MEDICINE | Facility: CLINIC | Age: 68
End: 2018-12-26

## 2018-12-26 NOTE — TELEPHONE ENCOUNTER
----- Message from Pacheco Beyer MD sent at 12/26/2018  6:29 AM CST -----  Please call the patient regarding his abnormal result. Please notify that his creatinine is 1.6. Please see how his blood pressure has been with the increased dose of Metoprolol.

## 2018-12-26 NOTE — TELEPHONE ENCOUNTER
Spoke with pt informed of results, he was fine with it and had no questions. Pt also stated that his B/P has been good and he will keep up with it and let us know in a week.

## 2019-01-03 ENCOUNTER — TELEPHONE (OUTPATIENT)
Dept: INTERNAL MEDICINE | Facility: CLINIC | Age: 69
End: 2019-01-03

## 2019-01-03 NOTE — TELEPHONE ENCOUNTER
----- Message from Pacheco Beyer MD sent at 1/3/2019  6:54 AM CST -----  Please call the patient regarding his abnormal result. His creatinine was 1.6. This is still elevated; but, down from one month ago. Please see how his blood pressure is.

## 2019-01-03 NOTE — TELEPHONE ENCOUNTER
I spoke to the patient. He was informed of the Cr results. He states that his B/p runs in the 160/100 range in the am prior to meds. It comes down to 130/80 after meds but at bedtime it climes back up to 150-160 over 95. He takes felodipine 10 mg 24 hr.everyday and metoprolol 50 mg 24 hr everyday. He thinks he should go up to 75 mg of metoprolol. He is presently is at [facility] receiving radiation treatment and will be back in town the week of Jan 21 st.  If you need to see him, he would like an appointment late in that week Thurs or Friday.

## 2019-01-05 ENCOUNTER — PATIENT MESSAGE (OUTPATIENT)
Dept: INTERNAL MEDICINE | Facility: CLINIC | Age: 69
End: 2019-01-05

## 2019-01-17 ENCOUNTER — TELEPHONE (OUTPATIENT)
Dept: INTERNAL MEDICINE | Facility: CLINIC | Age: 69
End: 2019-01-17

## 2019-01-17 NOTE — TELEPHONE ENCOUNTER
----- Message from Candelario Lopez sent at 1/17/2019 11:24 AM CST -----  Contact: Patient [phone]  Sooner appointment than the  can schedule.  Did you offer to schedule the next available appointment and put the patient on the wait list?: Declined   When is the first available appointment: 03/12/19  What is the nature of the appointment: Follow UP  What visit type: EP  Patient preference of timeframe to be scheduled:  Next Thursday or Friday  Comments:     Please call an advise  Thank you

## 2019-02-01 ENCOUNTER — OFFICE VISIT (OUTPATIENT)
Dept: INTERNAL MEDICINE | Facility: CLINIC | Age: 69
End: 2019-02-01
Payer: MEDICARE

## 2019-02-01 VITALS
HEIGHT: 69 IN | DIASTOLIC BLOOD PRESSURE: 82 MMHG | HEART RATE: 65 BPM | SYSTOLIC BLOOD PRESSURE: 148 MMHG | WEIGHT: 191.56 LBS | BODY MASS INDEX: 28.37 KG/M2 | OXYGEN SATURATION: 98 % | TEMPERATURE: 99 F

## 2019-02-01 DIAGNOSIS — N40.1 BENIGN PROSTATIC HYPERPLASIA WITH URINARY OBSTRUCTION: Primary | ICD-10-CM

## 2019-02-01 DIAGNOSIS — N13.8 BENIGN PROSTATIC HYPERPLASIA WITH URINARY OBSTRUCTION: Primary | ICD-10-CM

## 2019-02-01 DIAGNOSIS — I10 ESSENTIAL HYPERTENSION: ICD-10-CM

## 2019-02-01 PROCEDURE — 99213 OFFICE O/P EST LOW 20 MIN: CPT | Mod: S$GLB,,, | Performed by: NURSE PRACTITIONER

## 2019-02-01 PROCEDURE — 99213 PR OFFICE/OUTPT VISIT, EST, LEVL III, 20-29 MIN: ICD-10-PCS | Mod: S$GLB,,, | Performed by: NURSE PRACTITIONER

## 2019-02-01 PROCEDURE — 99999 PR PBB SHADOW E&M-EST. PATIENT-LVL V: ICD-10-PCS | Mod: PBBFAC,,, | Performed by: NURSE PRACTITIONER

## 2019-02-01 PROCEDURE — 99999 PR PBB SHADOW E&M-EST. PATIENT-LVL V: CPT | Mod: PBBFAC,,, | Performed by: NURSE PRACTITIONER

## 2019-02-01 RX ORDER — METOPROLOL SUCCINATE 100 MG/1
100 TABLET, EXTENDED RELEASE ORAL DAILY
Qty: 60 TABLET | Refills: 6 | Status: SHIPPED | OUTPATIENT
Start: 2019-02-01 | End: 2020-02-01

## 2019-02-01 RX ORDER — TAMSULOSIN HYDROCHLORIDE 0.4 MG/1
0.4 CAPSULE ORAL DAILY
Qty: 60 CAPSULE | Refills: 6 | Status: SHIPPED | OUTPATIENT
Start: 2019-02-01 | End: 2020-02-01

## 2019-02-01 RX ORDER — CAPECITABINE 500 MG/1
2500 TABLET, FILM COATED ORAL
COMMUNITY
Start: 2019-01-03 | End: 2019-02-18

## 2019-02-01 NOTE — PATIENT INSTRUCTIONS
Take 100 mg of your Metoprolol ( Toprol XL) you have 50 mg tablets at home take 2 and then I sent in a new prescription with the 100 mg tabs.  When you get the new prescription only take 1.    Continue Plendil 10 mg    Follow up when you get back from [facility] to check your blood pressure

## 2019-02-01 NOTE — PROGRESS NOTES
Subjective:       Patient ID: Eric Brown is a 68 y.o. male.    Chief Complaint: Blood Pressure Check    Disclaimer: This note has been generated using voice-recognition software. There may be typographical errors that have been missed during proof-reading    Pt of Art Beyer here for same-day appointment concerns are his blood pressure.  Pain did not take his blood pressure meds  this morning. Pt states that when he takes him meds his BP will run 130's/70's  and to go back to up to 140s and 150s over 80s mid afternoon.  Patient is concerned because he has gone back to [facility] on Monday for more chemo and he was told that the chemo may raise his blood pressure.      Review of Systems   Constitutional: Negative for activity change and appetite change.   HENT: Negative for congestion.    Respiratory: Negative for cough.         No orthopnea     Cardiovascular: Negative for chest pain, palpitations and leg swelling.   Musculoskeletal: Negative for arthralgias and myalgias.   Skin: Negative for rash.   Neurological: Negative for dizziness, light-headedness and headaches.   Psychiatric/Behavioral: Negative for sleep disturbance.         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    s/p resection, followed at [facility]    Kidney stone     Prediabetes     Vitreous floaters      Past Surgical History:   Procedure Laterality Date    BIOPSY, BLADDER N/A 11/27/2018    Performed by Rodger [facility]uera MD at [facility] OR 1ST FLR    CARDIAC SURGERY      CHOLECYSTECTOMY      5-2012    COLONOSCOPY N/A 11/24/2015    Performed by Emma Dhillon MD at [facility] ENDO (4TH FLR)    CYSTOSCOPY  11/27/2018     Performed by Rodger [facility]oguera MD at [facility] OR 1ST FLR    EXTRACORPOREAL SHOCK WAVE LITHOTRIPSY  2007 and 2008    litho      LIVER RESECTION  5-2012    Distal pancreatectomy    LIVER RESECTION  1-22-15    Hepatic cholangiocarcinoma    MITRAL VALVE SURGERY      PLACEMENT-STENT Right 11/27/2018    Performed by Rodger Noguera MD at [facility] OR 1ST FLR    REMOVAL-PORT-A-CATH N/A 2/22/2016    Performed by [facility] Diagnostic Provider at [facility] OR 2ND FLR     Social History     Social History Narrative    Not on file     Family History   Problem Relation Age of Onset    Hypertension Mother     Cataracts Father      Outpatient Encounter Medications as of 2/1/2019   Medication Sig Dispense Refill    allopurinol (ZYLOPRIM) 100 MG tablet TAKE 1 TABLET (100 MG TOTAL) BY MOUTH ONCE DAILY. 90 tablet 3    atorvastatin (LIPITOR) 10 MG tablet TAKE 1 TABLET (10 MG TOTAL) BY MOUTH ONCE DAILY. 90 tablet 3    capecitabine (XELODA) 500 MG Tab Take 2,500 mg by mouth.      cholecalciferol, vitamin D3, (VITAMIN D3) 1,000 unit capsule Take 5,000 mg by mouth.      felodipine (PLENDIL) 10 MG 24 hr tablet TAKE 1 TABLET BY MOUTH EVERY DAY 90 tablet 3    FLUZONE HIGH-DOSE 2018-19, PF, 180 mcg/0.5 mL vaccine       metoprolol succinate (TOPROL-XL) 100 MG 24 hr tablet Take 1 tablet (100 mg total) by mouth once daily. 60 tablet 6    MULTIVIT-IRON-MIN-FOLIC ACID 3,500-18-0.4 UNIT-MG-MG ORAL CHEW Take by mouth.      multivit-min-FA-lycopen-lutein 0.4-300-250 mg-mcg-mcg Tab Take by mouth.      omeprazole (PRILOSEC) 20 MG capsule TAKE 1 CAPSULE (20 MG TOTAL) BY MOUTH ONCE DAILY.      predniSONE (DELTASONE) 50 MG Tab TAKE BY MOUTH 1 TABLET 13 HOURS, 7 HOURS AND 1 HOUR PRIOR TO IMAGING  2    tamsulosin (FLOMAX) 0.4 mg Cap Take 1 capsule (0.4 mg total) by mouth once daily. 60 capsule 6    [DISCONTINUED] metoprolol succinate (TOPROL-XL) 50 MG 24 hr tablet Take 1 tablet (50 mg total) by mouth once daily. 30 tablet 11    [DISCONTINUED] tamsulosin  "(FLOMAX) 0.4 mg Cap Take 1 capsule (0.4 mg total) by mouth once daily. 30 capsule 0     No facility-administered encounter medications on file as of 2/1/2019.      Last 3 sets of Vitals  Vitals - 1 value per visit 11/27/2018 12/21/2018 2/1/2019   SYSTOLIC 170 164 148   DIASTOLIC 70 82 82   PULSE 51 66 65   TEMPERATURE 98.6 97.9 98.7   RESPIRATIONS 16 - -   SPO2 98 97 98   Weight (lb) 194 193.56 191.58   Weight (kg) 87.998 87.8 86.9   HEIGHT 5' 9" 5' 9" 5' 9"   BODY MASS INDEX 28.65 28.58 28.29   VISIT REPORT - - -   Pain Score  - 1 0   Some recent data might be hidden         Objective:      Physical Exam   Constitutional: He is oriented to person, place, and time. He appears well-developed and well-nourished. No distress.   HENT:   Head: Normocephalic and atraumatic.   Eyes: Conjunctivae are normal. No scleral icterus.   Neck: Normal range of motion. No JVD present.   Cardiovascular: Normal rate, regular rhythm, normal heart sounds and intact distal pulses.   No murmur heard.  Pulmonary/Chest: Effort normal and breath sounds normal. No stridor. No respiratory distress. He has no wheezes. He has no rales.   Neurological: He is alert and oriented to person, place, and time.   Skin: Skin is warm and dry. Capillary refill takes less than 2 seconds. No rash noted. He is not diaphoretic. No erythema. No pallor.   Psychiatric: He has a normal mood and affect. His behavior is normal. Judgment and thought content normal.   Nursing note and vitals reviewed.          Lab Results   Component Value Date    WBC 5.07 04/09/2018    RBC 3.73 (L) 04/09/2018    HGB 11.8 (L) 04/09/2018    HCT 36.4 (L) 04/09/2018    MCV 98 04/09/2018    MCH 31.6 (H) 04/09/2018    MCHC 32.4 04/09/2018    RDW 15.9 (H) 04/09/2018     (H) 04/09/2018    MPV 8.8 (L) 04/09/2018    GRAN 3.3 04/09/2018    LYMPH 1.5 02/02/2017    LYMPH 24.8 02/02/2017    MONO 0.5 02/02/2017    MONO 8.6 02/02/2017    EOS 0.1 02/02/2017    BASO 0.02 02/02/2017    EOSINOPHIL " 2.3 02/02/2017    BASOPHIL 0.3 02/02/2017     Lab Results   Component Value Date    WBC 5.07 04/09/2018    HGB 11.8 (L) 04/09/2018    HCT 36.4 (L) 04/09/2018     (H) 04/09/2018    CHOL 176 08/13/2018    TRIG 101 08/13/2018    HDL 47 08/13/2018    ALT 15 10/15/2018    AST 15 10/15/2018     12/24/2018    K 4.5 12/24/2018     12/24/2018    CREATININE 1.6 (H) 12/24/2018    BUN 20 12/24/2018    CO2 28 12/24/2018    TSH 2.740 07/11/2016    PSA 2.4 02/01/2018    INR 0.9 02/22/2016    HGBA1C 5.9 (H) 08/13/2018       Assessment:       1. Benign prostatic hyperplasia with urinary obstruction    2. Essential hypertension        Plan:           Eric was seen today for blood pressure check.    Diagnoses and all orders for this visit:    Benign prostatic hyperplasia with urinary obstruction  -     tamsulosin (FLOMAX) 0.4 mg Cap; Take 1 capsule (0.4 mg total) by mouth once daily.    Essential hypertension  -     metoprolol succinate (TOPROL-XL) 100 MG 24 hr tablet; Take 1 tablet (100 mg total) by mouth once daily.      Patient Instructions   Take 100 mg of your Metoprolol ( Toprol XL) you have 50 mg tablets at home take 2 and then I sent in a new prescription with the 100 mg tabs.  When you get the new prescription only take 1.    Continue Plendil 10 mg    Follow up when you get back from [facility] to check your blood pressure

## 2019-02-04 ENCOUNTER — TELEPHONE (OUTPATIENT)
Dept: HEMATOLOGY/ONCOLOGY | Facility: CLINIC | Age: 69
End: 2019-02-04

## 2019-02-04 NOTE — TELEPHONE ENCOUNTER
spoke with pt on today in regards to schedule appointment on 02/15/19, spouse states this date works for the pt and would like to keep scheduled.

## 2019-02-07 RX ORDER — ATORVASTATIN CALCIUM 10 MG/1
10 TABLET, FILM COATED ORAL DAILY
Qty: 90 TABLET | Refills: 2 | Status: SHIPPED | OUTPATIENT
Start: 2019-02-07 | End: 2019-09-16

## 2019-02-15 ENCOUNTER — OFFICE VISIT (OUTPATIENT)
Dept: HEMATOLOGY/ONCOLOGY | Facility: CLINIC | Age: 69
End: 2019-02-15
Payer: MEDICARE

## 2019-02-15 VITALS
WEIGHT: 193.56 LBS | BODY MASS INDEX: 28.67 KG/M2 | HEIGHT: 69 IN | SYSTOLIC BLOOD PRESSURE: 134 MMHG | HEART RATE: 61 BPM | RESPIRATION RATE: 20 BRPM | DIASTOLIC BLOOD PRESSURE: 68 MMHG | OXYGEN SATURATION: 96 % | TEMPERATURE: 98 F

## 2019-02-15 DIAGNOSIS — C78.6 SECONDARY ADENOCARCINOMA OF RETROPERITONEUM: ICD-10-CM

## 2019-02-15 DIAGNOSIS — C78.00 MALIGNANT NEOPLASM METASTATIC TO LUNG, UNSPECIFIED LATERALITY: ICD-10-CM

## 2019-02-15 DIAGNOSIS — C22.1 INTRAHEPATIC CHOLANGIOCARCINOMA: Primary | ICD-10-CM

## 2019-02-15 PROCEDURE — 99999 PR PBB SHADOW E&M-EST. PATIENT-LVL IV: CPT | Mod: PBBFAC,,, | Performed by: INTERNAL MEDICINE

## 2019-02-15 PROCEDURE — 99214 PR OFFICE/OUTPT VISIT, EST, LEVL IV, 30-39 MIN: ICD-10-PCS | Mod: S$GLB,,, | Performed by: INTERNAL MEDICINE

## 2019-02-15 PROCEDURE — 99999 PR PBB SHADOW E&M-EST. PATIENT-LVL IV: ICD-10-PCS | Mod: PBBFAC,,, | Performed by: INTERNAL MEDICINE

## 2019-02-15 PROCEDURE — 99214 OFFICE O/P EST MOD 30 MIN: CPT | Mod: S$GLB,,, | Performed by: INTERNAL MEDICINE

## 2019-02-15 RX ORDER — ONDANSETRON HYDROCHLORIDE 8 MG/1
8 TABLET, FILM COATED ORAL 4 TIMES DAILY PRN
Qty: 60 TABLET | Refills: 2 | Status: SHIPPED | OUTPATIENT
Start: 2019-02-15 | End: 2019-06-20 | Stop reason: SDUPTHER

## 2019-02-15 RX ORDER — TRAMADOL HYDROCHLORIDE 50 MG/1
50 TABLET ORAL EVERY 12 HOURS PRN
Qty: 60 TABLET | Refills: 2 | Status: ON HOLD | OUTPATIENT
Start: 2019-02-15 | End: 2019-04-16 | Stop reason: SDUPTHER

## 2019-02-15 RX ORDER — PROMETHAZINE HYDROCHLORIDE 25 MG/1
25 TABLET ORAL EVERY 6 HOURS PRN
Qty: 60 TABLET | Refills: 7 | Status: SHIPPED | OUTPATIENT
Start: 2019-02-15 | End: 2019-02-22

## 2019-02-15 NOTE — PROGRESS NOTES
"Subjective:       Patient ID: Eric Brown is a 68 y.o. male.    Chief Complaint: Intrahepatic cholangiocarcinoma  ONCOLOGIC HISTORY:  Mr. Brown is a 67 year old male who in 6/2012 underwent a distal pancreatectomy for a PNET and a liver resection for a T2N0M0 intrahepatic cholangiocarcinoma.  Unfortunately had a recurrence of the cholangiocarcinoma and underwent a repeat resection at [facility].  Completed adjuvant treatment with cisplatin and gemcitabine - 6 cycles.     He has been on surveillance.     He underwent PET scan on 1/3/18 at [facility] which reveals "Scattered subcentimeter pulmonary nodules, some new/larger than on 07/31/2017, nonspecific, possibly metastatic or inflammatory, to be followed.  Small metabolically active foci in the liver, at sites of previously noted subcentimeter hypervascular nodules on liver protocol CT dated 10/02/2017, concerning for metastases Metabolically active periportal and retroperitoneal adenopathy, larger than on 10/02/2017, consistent with metastatic disease. Spiculated soft tissue in the pancreatectomy bed region, with low-grade metabolic activity, most likely related to postsurgical change, but local recurrence cannot be entirely excluded, to be followed"  He underwent biopsy of soft tissue of peritoneum and pathology revealed metastatic cancer compatible with mets from cholangiocarcinoma. He has been recommended to start chemo with Cisplatin and gemzar and they are also evaluating him for trials.          HPIHe completed 3 cycles of Cisplatin and Gemzar until 3/18 and progressed  He then went to [facility] and started Protocol with  4/2018 - 12/2018   He recently underwent MRI abdomen at [facility] on 1/28/19 which reveals "Stable to slightly increased size of metastatic disease in the liver remnant (2 lesions). Other arterial foci are nonspecific and may represent shunt phenomena. Focal enhancement in the pancreatic resection bed is stable compared to " "11/07/2018 and nonspecific. Surgical bed fluid has resolved. Retroperitoneal lymphadenopathy has moderately increased"  CT chest on 1/29/19 reveals Compared to 11/07/2018, innumerable metastatic lung nodules have increased in size.  He is now on a PARP inhibitor trial Niraparib which he just started    He feels well and denies any issues      Review of Systems   Constitutional: Negative for appetite change, fatigue and unexpected weight change.   HENT: Negative for mouth sores.    Eyes: Negative for visual disturbance.   Respiratory: Negative for cough and shortness of breath.    Cardiovascular: Negative for chest pain.   Gastrointestinal: Negative for abdominal pain and diarrhea.   Genitourinary: Negative for frequency.   Musculoskeletal: Negative for back pain.   Skin: Negative for rash.   Neurological: Negative for headaches.   Hematological: Negative for adenopathy.   Psychiatric/Behavioral: The patient is not nervous/anxious.    All other systems reviewed and are negative.      Objective:      Physical Exam   Constitutional: He is oriented to person, place, and time. He appears well-developed and well-nourished.   HENT:   Mouth/Throat: No oropharyngeal exudate.   Cardiovascular: Normal rate and normal heart sounds.   Pulmonary/Chest: Effort normal and breath sounds normal. He has no wheezes.   Abdominal: Soft. Bowel sounds are normal. There is no tenderness.   Musculoskeletal: He exhibits no edema or tenderness.   Lymphadenopathy:     He has no cervical adenopathy.   Neurological: He is alert and oriented to person, place, and time. Coordination normal.   Skin: Skin is warm and dry. No rash noted.   Psychiatric: He has a normal mood and affect. Judgment and thought content normal.   Vitals reviewed.      LABS:  WBC   Date Value Ref Range Status   04/09/2018 5.07 3.90 - 12.70 K/uL Final     Hemoglobin   Date Value Ref Range Status   04/09/2018 11.8 (L) 14.0 - 18.0 g/dL Final     Hematocrit   Date Value Ref Range " Status   04/09/2018 36.4 (L) 40.0 - 54.0 % Final     Platelets   Date Value Ref Range Status   04/09/2018 438 (H) 150 - 350 K/uL Final     Gran # (ANC)   Date Value Ref Range Status   04/09/2018 3.3 1.8 - 7.7 K/uL Final     Comment:     The ANC is based on a white cell differential from an   automated cell counter. It has not been microscopically   reviewed for the presence of abnormal cells. Clinical   correlation is required.         Chemistry        Component Value Date/Time     12/24/2018 0839    K 4.5 12/24/2018 0839     12/24/2018 0839    CO2 28 12/24/2018 0839    BUN 20 12/24/2018 0839    CREATININE 1.6 (H) 12/24/2018 0839     (H) 12/24/2018 0839        Component Value Date/Time    CALCIUM 9.2 12/24/2018 0839    ALKPHOS 60 10/15/2018 0935    AST 15 10/15/2018 0935    ALT 15 10/15/2018 0935    BILITOT 0.5 10/15/2018 0935    ESTGFRAFRICA 50 (A) 12/24/2018 0839    EGFRNONAA 43 (A) 12/24/2018 0839          Assessment:       1. Intrahepatic cholangiocarcinoma    2. Malignant neoplasm metastatic to lung, unspecified laterality    3. Secondary adenocarcinoma of retroperitoneum        Plan:        1,2,3. He will continue on the PARP inhibitor trial at [facility]. He will do labs here on days 1, 8 and 15 per trial  Will see him on an as needed basis    Above care plan was discussed with patient and accompanying wife and son and all questions were addressed to their satisfaction

## 2019-02-18 ENCOUNTER — OFFICE VISIT (OUTPATIENT)
Dept: INTERNAL MEDICINE | Facility: CLINIC | Age: 69
End: 2019-02-18
Payer: MEDICARE

## 2019-02-18 VITALS
WEIGHT: 194.25 LBS | HEIGHT: 69 IN | BODY MASS INDEX: 28.77 KG/M2 | DIASTOLIC BLOOD PRESSURE: 72 MMHG | SYSTOLIC BLOOD PRESSURE: 134 MMHG | HEART RATE: 59 BPM | TEMPERATURE: 98 F | OXYGEN SATURATION: 97 %

## 2019-02-18 DIAGNOSIS — C22.1 CHOLANGIOCARCINOMA: ICD-10-CM

## 2019-02-18 DIAGNOSIS — I10 ESSENTIAL HYPERTENSION: Primary | ICD-10-CM

## 2019-02-18 DIAGNOSIS — R79.89 ELEVATED SERUM CREATININE: ICD-10-CM

## 2019-02-18 DIAGNOSIS — N18.30 STAGE 3 CHRONIC KIDNEY DISEASE: ICD-10-CM

## 2019-02-18 PROCEDURE — 99214 OFFICE O/P EST MOD 30 MIN: CPT | Mod: S$GLB,,, | Performed by: INTERNAL MEDICINE

## 2019-02-18 PROCEDURE — 99214 PR OFFICE/OUTPT VISIT, EST, LEVL IV, 30-39 MIN: ICD-10-PCS | Mod: S$GLB,,, | Performed by: INTERNAL MEDICINE

## 2019-02-18 PROCEDURE — 99999 PR PBB SHADOW E&M-EST. PATIENT-LVL IV: CPT | Mod: PBBFAC,,, | Performed by: INTERNAL MEDICINE

## 2019-02-18 PROCEDURE — 99999 PR PBB SHADOW E&M-EST. PATIENT-LVL IV: ICD-10-PCS | Mod: PBBFAC,,, | Performed by: INTERNAL MEDICINE

## 2019-02-18 RX ORDER — FELODIPINE 5 MG/1
5 TABLET, EXTENDED RELEASE ORAL 2 TIMES DAILY
Qty: 60 TABLET | Refills: 11 | Status: SHIPPED | OUTPATIENT
Start: 2019-02-18 | End: 2019-09-06

## 2019-02-18 RX ORDER — OMEPRAZOLE 20 MG/1
20 CAPSULE, DELAYED RELEASE ORAL DAILY
Qty: 90 CAPSULE | Refills: 3 | Status: SHIPPED | OUTPATIENT
Start: 2019-02-18

## 2019-02-20 NOTE — PROGRESS NOTES
CHIEF COMPLAINT:  Followup of blood pressure.    HISTORY OF PRESENT ILLNESS:  The patient is a 68-year-old gentleman who is   currently being treated for cholangiocarcinoma as well as hypertension and   hyperlipidemia who comes in today for followup.  The patient is on a research   study at [facility].  His concern is his creatinine.  In order to stay on the   study, his creatinine has to be under 1.7.  He has been around 1.5-1.6.  He does   state that his blood pressure is still fluctuating.  It is better in the   morning.  It tends to rise toward the evening.  He is currently taking   metoprolol 100 mg a half a tablet twice a day.  He is on Plendil 10 mg once a   day.  He finds his pressure is better if he takes a half of a Plendil in the   morning and a half at night.  In the morning, he states that when he first gets   up, his blood pressure is around 160, but two hours after he takes his   medication, it goes down to around 110-130.  It tends to rise in the evening to   around 140-150.    REVIEW OF SYSTEMS:  The patient reports that he can tell when his pressure goes   up because he will feel a discomfort in the chest.  He also reports when his   pressure gets too low, he gets tired.    PHYSICAL EXAMINATION:  GENERAL APPEARANCE:  No acute distress.  NECK:  Trachea is midline without JVD.  PULMONARY:  Good inspiratory, expiratory breath sounds are heard.  Lungs are   clear to auscultation.  CARDIOVASCULAR:  S1, S2.  EXTREMITIES:  With trace edema.  ABDOMEN:  Nontender, nondistended, without hepatosplenomegaly.    ASSESSMENT:  1.  Hypertension.  2.  Cholangiocarcinoma.  3.  Stage III kidney disease.  4.  Elevated serum creatinine.    PLAN:  Did discuss with the patient about referring him to Nephrology to see if   they can make any medication adjustments to help protect his kidneys.  We will   go ahead and change his Plendil to 5 mg, so he can take it twice a day.  He is   to follow up with us after he returns  back from [facility].      PREM/RYNE  dd: 02/20/2019 06:46:59 (CST)  td: 02/21/2019 04:01:29 (CST)  Doc ID   #[id number]  Job ID #[id number]    CC:

## 2019-03-11 ENCOUNTER — OFFICE VISIT (OUTPATIENT)
Dept: UROLOGY | Facility: CLINIC | Age: 69
End: 2019-03-11
Payer: MEDICARE

## 2019-03-11 ENCOUNTER — TELEPHONE (OUTPATIENT)
Dept: UROLOGY | Facility: CLINIC | Age: 69
End: 2019-03-11

## 2019-03-11 VITALS
HEART RATE: 63 BPM | BODY MASS INDEX: 28.08 KG/M2 | HEIGHT: 69 IN | SYSTOLIC BLOOD PRESSURE: 160 MMHG | DIASTOLIC BLOOD PRESSURE: 82 MMHG | WEIGHT: 189.63 LBS

## 2019-03-11 DIAGNOSIS — N13.39 OTHER HYDRONEPHROSIS: Primary | ICD-10-CM

## 2019-03-11 DIAGNOSIS — C22.1 INTRAHEPATIC CHOLANGIOCARCINOMA: ICD-10-CM

## 2019-03-11 DIAGNOSIS — D49.4 BLADDER TUMOR: ICD-10-CM

## 2019-03-11 PROCEDURE — 87086 URINE CULTURE/COLONY COUNT: CPT

## 2019-03-11 PROCEDURE — 99999 PR PBB SHADOW E&M-EST. PATIENT-LVL III: CPT | Mod: PBBFAC,,, | Performed by: UROLOGY

## 2019-03-11 PROCEDURE — 99999 PR PBB SHADOW E&M-EST. PATIENT-LVL III: ICD-10-PCS | Mod: PBBFAC,,, | Performed by: UROLOGY

## 2019-03-11 PROCEDURE — 99214 PR OFFICE/OUTPT VISIT, EST, LEVL IV, 30-39 MIN: ICD-10-PCS | Mod: S$GLB,,, | Performed by: UROLOGY

## 2019-03-11 PROCEDURE — 99214 OFFICE O/P EST MOD 30 MIN: CPT | Mod: S$GLB,,, | Performed by: UROLOGY

## 2019-03-11 RX ORDER — LOSARTAN POTASSIUM AND HYDROCHLOROTHIAZIDE 12.5; 1 MG/1; MG/1
1 TABLET ORAL DAILY
Refills: 3 | COMMUNITY
Start: 2019-02-01 | End: 2019-03-11

## 2019-03-11 NOTE — PROGRESS NOTES
CHIEF COMPLAINT:    Mr. Brown is a 68 y.o. male presenting with hydro.    PRESENTING ILLNESS:    Eric Brown is a 68 y.o. male with stage 4 cholangiocarcinoma of the liver.  Currently undergoing experimental chemo at [facility].  Recent CT scan shows R hydro due to what appears to be para aortic lymph node compression.  I reviewed the films personally.    He underwent a R JJ stent on 11/27/18.  At that time, it was noted that he had a bladder tumor as well.  This has been discussed with him, his son, and the treating physician at [place].  It's being observed.    His Cr has been stable since the JJ stent.  Did not decline much.  Was 1.9 pre-op.  Currently 1.6.    No dysuria.  No hematuria.    REVIEW OF SYSTEMS:    Eric Brown denies headache, blurred vision, fever, nausea, vomiting, chills, abdominal pain, bleeding per rectum, cough, SOB, recent loss of consciousness, recent mental status changes, seizures, dizziness, or upper or lower extremity weakness.    BILL  1.   2.   3.   4.   5.       PATIENT HISTORY:    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    s/p resection, followed at [facility]    Kidney stone     Prediabetes     Vitreous floaters        Past Surgical History:   Procedure Laterality Date    BIOPSY, BLADDER N/A 11/27/2018    Performed by [facility] at [facility] OR 1ST FLR    CARDIAC SURGERY      CHOLECYSTECTOMY      5-2012    COLONOSCOPY N/A 11/24/2015    Performed by Emma Dhillon MD at [facility] ENDO (4TH FLR)    CYSTOSCOPY  11/27/2018    Performed by [facility]era [facility] at [facility] OR 1ST FLR    EXTRACORPOREAL SHOCK WAVE LITHOTRIPSY  2007 and 2008    litho       LIVER RESECTION      Distal pancreatectomy    LIVER RESECTION  1-22-15    Hepatic cholangiocarcinoma    MITRAL VALVE SURGERY      PLACEMENT-STENT Right 2018    Performed by [facility] at [facility] OR 1ST FLR    REMOVAL-PORT-A-CATH N/A 2016    Performed by [facility] Diagnostic Provider at [facility] OR 2ND FLR       Family History   Problem Relation Age of Onset    Hypertension Mother     Cataracts Father        Social History     Socioeconomic History    Marital status:      Spouse name: Not on file    Number of children: Not on file    Years of education: Not on file    Highest education level: Not on file   Social Needs    Financial resource strain: Not on file    Food insecurity - worry: Not on file    Food insecurity - inability: Not on file    Transportation needs - medical: Not on file    Transportation needs - non-medical: Not on file   Occupational History     Employer: [employer]   Tobacco Use    Smoking status: Former Smoker     Last attempt to quit: 1985     Years since quittin.0    Smokeless tobacco: Never Used   Substance and Sexual Activity    Alcohol use: No     Comment: occasion    Drug use: No    Sexual activity: Not on file   Other Topics Concern    Not on file   Social History Narrative    Not on file       Allergies:  Iodine and iodide containing products and Iohexol    Medications:    Current Outpatient Medications:     allopurinol (ZYLOPRIM) 100 MG tablet, TAKE 1 TABLET (100 MG TOTAL) BY MOUTH ONCE DAILY., Disp: 90 tablet, Rfl: 3    atorvastatin (LIPITOR) 10 MG tablet, TAKE 1 TABLET (10 MG TOTAL) BY MOUTH ONCE DAILY., Disp: 90 tablet, Rfl: 2    cholecalciferol, vitamin D3, (VITAMIN D3) 1,000 unit capsule, Take 5,000 mg by mouth., Disp: , Rfl:     felodipine (PLENDIL) 5 MG 24 hr tablet, Take 1 tablet (5 mg total) by mouth 2 (two) times daily., Disp: 60 tablet, Rfl: 11    metoprolol succinate (TOPROL-XL) 100 MG 24 hr tablet, Take 1 tablet (100  mg total) by mouth once daily., Disp: 60 tablet, Rfl: 6    MULTIVIT-IRON-MIN-FOLIC ACID 3,500-18-0.4 UNIT-MG-MG ORAL CHEW, Take by mouth., Disp: , Rfl:     multivit-min-FA-lycopen-lutein 0.4-300-250 mg-mcg-mcg Tab, Take by mouth., Disp: , Rfl:     niraparib tosylate (NIRAPARIB ORAL), Take 300 mg by mouth., Disp: , Rfl:     omeprazole (PRILOSEC) 20 MG capsule, Take 1 capsule (20 mg total) by mouth once daily., Disp: 90 capsule, Rfl: 3    ondansetron (ZOFRAN) 8 MG tablet, Take 1 tablet (8 mg total) by mouth 4 (four) times daily as needed for Nausea., Disp: 60 tablet, Rfl: 2    tamsulosin (FLOMAX) 0.4 mg Cap, Take 1 capsule (0.4 mg total) by mouth once daily., Disp: 60 capsule, Rfl: 6    traMADol (ULTRAM) 50 mg tablet, Take 1 tablet (50 mg total) by mouth every 12 (twelve) hours as needed for Pain., Disp: 60 tablet, Rfl: 2    predniSONE (DELTASONE) 50 MG Tab, TAKE BY MOUTH 1 TABLET 13 HOURS, 7 HOURS AND 1 HOUR PRIOR TO IMAGING, Disp: , Rfl: 2    PHYSICAL EXAMINATION:    The patient generally appears in good health, is appropriately interactive, and is in no apparent distress.     Eyes: anicteric sclerae, moist conjunctivae; no lid-lag; PERRLA     HENT: Atraumatic; oropharynx clear with moist mucous membranes and no mucosal ulcerations;normal hard and soft palate.  No evidence of lymphadenopathy.    Neck: Trachea midline.  No thyromegaly.    Musculoskeletal: No abnormal gait.    Skin: No lesions.    Mental: Cooperative with normal affect.  Is oriented to time, place, and person.    Neuro: Grossly intact.    Chest: Normal inspiratory effort.   No accessory muscles.  No audible wheezes.  Respirations symmetric on inspiration and expiration.    Heart: Regular rhythm.      Abdomen:  Soft, non-tender. No masses or organomegaly. Bladder is not palpable. No evidence of flank discomfort. No evidence of inguinal hernia.    Genitourinary: The penis has no evidence of plaques or induration. The urethral meatus is normal.  The testes, epididymides, and cord structures are normal in size and contour bilaterally. The scrotum is normal in size and contour.      Extremities: No clubbing, cyanosis, or edema      LABS:    UA dipped negative today  Lab Results   Component Value Date    PSA 2.4 02/01/2018    PSA 2.1 02/02/2017    PSA 1.8 11/14/2015       IMPRESSION:    Encounter Diagnoses   Name Primary?    Other hydronephrosis Yes    Intrahepatic cholangiocarcinoma     Bladder tumor          PLAN:    1. Discussed that he appears to have R hydro due to external compression.  Discussed that his Cr didn't decline much.  Discussed medical renal disease vs obstruction.  Offered neph tube.  He'd like to continue with JJ stents as his Cr is stable.  Will plan for JJ stent exchange.  I  have explained the risk, benefits, and alternatives of the procedure in detail. The patient voices understanding and all questions have been answered. The patient agrees to proceed as planned.   2. Will send his urine for culture.  3. Continue to observe the bladder tumor.     Copy to:

## 2019-03-12 LAB — BACTERIA UR CULT: NO GROWTH

## 2019-03-14 ENCOUNTER — TELEPHONE (OUTPATIENT)
Dept: HEMATOLOGY/ONCOLOGY | Facility: CLINIC | Age: 69
End: 2019-03-14

## 2019-03-14 ENCOUNTER — LAB VISIT (OUTPATIENT)
Dept: LAB | Facility: HOSPITAL | Age: 69
End: 2019-03-14
Attending: INTERNAL MEDICINE
Payer: MEDICARE

## 2019-03-14 DIAGNOSIS — C22.1 INTRAHEPATIC CHOLANGIOCARCINOMA: ICD-10-CM

## 2019-03-14 LAB
ALBUMIN SERPL BCP-MCNC: 3.7 G/DL
ALP SERPL-CCNC: 81 U/L
ALT SERPL W/O P-5'-P-CCNC: 17 U/L
ANION GAP SERPL CALC-SCNC: 8 MMOL/L
AST SERPL-CCNC: 21 U/L
BILIRUB SERPL-MCNC: 0.5 MG/DL
BUN SERPL-MCNC: 27 MG/DL
CALCIUM SERPL-MCNC: 9.7 MG/DL
CHLORIDE SERPL-SCNC: 99 MMOL/L
CO2 SERPL-SCNC: 23 MMOL/L
CREAT SERPL-MCNC: 1.6 MG/DL
ERYTHROCYTE [DISTWIDTH] IN BLOOD BY AUTOMATED COUNT: 14.6 %
EST. GFR  (AFRICAN AMERICAN): 50.1 ML/MIN/1.73 M^2
EST. GFR  (NON AFRICAN AMERICAN): 43.3 ML/MIN/1.73 M^2
GLUCOSE SERPL-MCNC: 109 MG/DL
HCT VFR BLD AUTO: 32 %
HGB BLD-MCNC: 10.7 G/DL
IMM GRANULOCYTES # BLD AUTO: 0.08 K/UL
MCH RBC QN AUTO: 30.5 PG
MCHC RBC AUTO-ENTMCNC: 33.4 G/DL
MCV RBC AUTO: 91 FL
NEUTROPHILS # BLD AUTO: 9.9 K/UL
PLATELET # BLD AUTO: 239 K/UL
PMV BLD AUTO: 9.3 FL
POTASSIUM SERPL-SCNC: 4.8 MMOL/L
PROT SERPL-MCNC: 8.1 G/DL
RBC # BLD AUTO: 3.51 M/UL
SODIUM SERPL-SCNC: 130 MMOL/L
WBC # BLD AUTO: 11.95 K/UL

## 2019-03-14 PROCEDURE — 36415 COLL VENOUS BLD VENIPUNCTURE: CPT

## 2019-03-14 PROCEDURE — 85027 COMPLETE CBC AUTOMATED: CPT

## 2019-03-14 PROCEDURE — 80053 COMPREHEN METABOLIC PANEL: CPT

## 2019-03-14 NOTE — TELEPHONE ENCOUNTER
Spoke with patient.  He is calling to request his labs to be released.  Nurse released labs.  He thanked nurse.

## 2019-03-14 NOTE — TELEPHONE ENCOUNTER
----- Message from Archana Lockwood sent at 3/14/2019 12:56 PM CDT -----  Contact: Pt [phone]  Pt called in regarding a concern he is trying to address with MD Alonzo about  cdc data differential , comprehensive panel , metabolic details etc.   Pt trying to get the information over today them     Please call pt for specific details at [phone]. Thank you

## 2019-03-20 ENCOUNTER — TELEPHONE (OUTPATIENT)
Dept: NEPHROLOGY | Facility: CLINIC | Age: 69
End: 2019-03-20

## 2019-03-20 DIAGNOSIS — N18.2 CRD (CHRONIC RENAL DISEASE), STAGE II: Primary | ICD-10-CM

## 2019-03-21 ENCOUNTER — TELEPHONE (OUTPATIENT)
Dept: HEMATOLOGY/ONCOLOGY | Facility: CLINIC | Age: 69
End: 2019-03-21

## 2019-03-21 ENCOUNTER — LAB VISIT (OUTPATIENT)
Dept: LAB | Facility: HOSPITAL | Age: 69
End: 2019-03-21
Attending: INTERNAL MEDICINE
Payer: MEDICARE

## 2019-03-21 DIAGNOSIS — N18.2 CRD (CHRONIC RENAL DISEASE), STAGE II: ICD-10-CM

## 2019-03-21 DIAGNOSIS — C22.1 INTRAHEPATIC CHOLANGIOCARCINOMA: ICD-10-CM

## 2019-03-21 LAB
ALBUMIN SERPL BCP-MCNC: 3.4 G/DL
ALP SERPL-CCNC: 91 U/L
ALT SERPL W/O P-5'-P-CCNC: 16 U/L
ANION GAP SERPL CALC-SCNC: 12 MMOL/L
AST SERPL-CCNC: 15 U/L
BASOPHILS # BLD AUTO: 0.03 K/UL
BASOPHILS NFR BLD: 0.4 %
BILIRUB SERPL-MCNC: 0.6 MG/DL
BUN SERPL-MCNC: 21 MG/DL
CALCIUM SERPL-MCNC: 9 MG/DL
CHLORIDE SERPL-SCNC: 100 MMOL/L
CO2 SERPL-SCNC: 23 MMOL/L
CREAT SERPL-MCNC: 1.5 MG/DL
DIFFERENTIAL METHOD: ABNORMAL
EOSINOPHIL # BLD AUTO: 0.1 K/UL
EOSINOPHIL NFR BLD: 1.1 %
ERYTHROCYTE [DISTWIDTH] IN BLOOD BY AUTOMATED COUNT: 15.2 %
ERYTHROCYTE [DISTWIDTH] IN BLOOD BY AUTOMATED COUNT: 15.2 %
EST. GFR  (AFRICAN AMERICAN): 54.1 ML/MIN/1.73 M^2
EST. GFR  (NON AFRICAN AMERICAN): 46.8 ML/MIN/1.73 M^2
GLUCOSE SERPL-MCNC: 121 MG/DL
HCT VFR BLD AUTO: 32.8 %
HCT VFR BLD AUTO: 32.8 %
HGB BLD-MCNC: 10.8 G/DL
HGB BLD-MCNC: 10.8 G/DL
IMM GRANULOCYTES # BLD AUTO: 0.05 K/UL
IMM GRANULOCYTES # BLD AUTO: 0.05 K/UL
IMM GRANULOCYTES NFR BLD AUTO: 0.7 %
LYMPHOCYTES # BLD AUTO: 0.7 K/UL
LYMPHOCYTES NFR BLD: 10.5 %
MCH RBC QN AUTO: 30.7 PG
MCH RBC QN AUTO: 30.7 PG
MCHC RBC AUTO-ENTMCNC: 32.9 G/DL
MCHC RBC AUTO-ENTMCNC: 32.9 G/DL
MCV RBC AUTO: 93 FL
MCV RBC AUTO: 93 FL
MONOCYTES # BLD AUTO: 0.7 K/UL
MONOCYTES NFR BLD: 9.4 %
NEUTROPHILS # BLD AUTO: 5.5 K/UL
NEUTROPHILS # BLD AUTO: 5.5 K/UL
NEUTROPHILS NFR BLD: 77.9 %
NRBC BLD-RTO: 0 /100 WBC
PLATELET # BLD AUTO: 267 K/UL
PLATELET # BLD AUTO: 267 K/UL
PMV BLD AUTO: 9.3 FL
PMV BLD AUTO: 9.3 FL
POTASSIUM SERPL-SCNC: 4.2 MMOL/L
PROT SERPL-MCNC: 7.5 G/DL
RBC # BLD AUTO: 3.52 M/UL
RBC # BLD AUTO: 3.52 M/UL
SODIUM SERPL-SCNC: 135 MMOL/L
WBC # BLD AUTO: 7.02 K/UL
WBC # BLD AUTO: 7.02 K/UL

## 2019-03-21 PROCEDURE — 85025 COMPLETE CBC W/AUTO DIFF WBC: CPT

## 2019-03-21 PROCEDURE — 80053 COMPREHEN METABOLIC PANEL: CPT

## 2019-03-21 PROCEDURE — 36415 COLL VENOUS BLD VENIPUNCTURE: CPT

## 2019-03-21 NOTE — TELEPHONE ENCOUNTER
Spoke with patient.  He is requesting nurse to fax over labs results from today to [facility] , dr darnell at [phone], as the nurse navigator states the doctor can not see the results.  Nurse will fax now.

## 2019-03-21 NOTE — TELEPHONE ENCOUNTER
----- Message from Nirmal Byers sent at 3/21/2019 12:48 PM CDT -----  Contact: Pt  Type:  Test Results    Who Called: Eric Brown  Name of Test (Lab/Mammo/Etc): Non fasting lab  Date of Test: 3/21/19  Ordering Provider: Dr. Amalia Canales  Where the test was performed: [facility] lab  Would the patient rather a call back or a response via [facility]? My [facility]  Best Call Back Number:[phone]

## 2019-03-28 ENCOUNTER — PATIENT MESSAGE (OUTPATIENT)
Dept: HEMATOLOGY/ONCOLOGY | Facility: CLINIC | Age: 69
End: 2019-03-28

## 2019-04-01 ENCOUNTER — TELEPHONE (OUTPATIENT)
Dept: OPHTHALMOLOGY | Facility: CLINIC | Age: 69
End: 2019-04-01

## 2019-04-01 NOTE — TELEPHONE ENCOUNTER
----- Message from Ivet Rankin sent at 4/1/2019  9:22 AM CDT -----  Contact: Eric Tovar calling to reschedule cataract surgery with Dr. Dhillon.  He can be reached at [phone].

## 2019-04-02 ENCOUNTER — OFFICE VISIT (OUTPATIENT)
Dept: SPORTS MEDICINE | Facility: CLINIC | Age: 69
End: 2019-04-02
Payer: MEDICARE

## 2019-04-02 ENCOUNTER — HOSPITAL ENCOUNTER (OUTPATIENT)
Dept: RADIOLOGY | Facility: HOSPITAL | Age: 69
Discharge: HOME | End: 2019-04-02
Attending: ORTHOPAEDIC SURGERY
Payer: MEDICARE

## 2019-04-02 VITALS
HEART RATE: 74 BPM | WEIGHT: 189 LBS | BODY MASS INDEX: 27.99 KG/M2 | SYSTOLIC BLOOD PRESSURE: 144 MMHG | HEIGHT: 69 IN | DIASTOLIC BLOOD PRESSURE: 84 MMHG

## 2019-04-02 DIAGNOSIS — M25.531 RIGHT WRIST PAIN: ICD-10-CM

## 2019-04-02 DIAGNOSIS — M25.531 RIGHT WRIST PAIN: Primary | ICD-10-CM

## 2019-04-02 PROCEDURE — 73110 X-RAY EXAM OF WRIST: CPT | Mod: TC,FY,PO,RT

## 2019-04-02 PROCEDURE — 73110 X-RAY EXAM OF WRIST: CPT | Mod: 26,RT,, | Performed by: RADIOLOGY

## 2019-04-02 PROCEDURE — 73110 XR WRIST COMPLETE 3 VIEWS RIGHT: ICD-10-PCS | Mod: 26,RT,, | Performed by: RADIOLOGY

## 2019-04-02 PROCEDURE — 20605 INTERMEDIATE JOINT ASPIRATION/INJECTION: R RADIOCARPAL: ICD-10-PCS | Mod: RT,GC,S$GLB, | Performed by: ORTHOPAEDIC SURGERY

## 2019-04-02 PROCEDURE — 20605 DRAIN/INJ JOINT/BURSA W/O US: CPT | Mod: RT,GC,S$GLB, | Performed by: ORTHOPAEDIC SURGERY

## 2019-04-02 PROCEDURE — 99214 PR OFFICE/OUTPT VISIT, EST, LEVL IV, 30-39 MIN: ICD-10-PCS | Mod: 25,S$GLB,, | Performed by: ORTHOPAEDIC SURGERY

## 2019-04-02 PROCEDURE — 99999 PR PBB SHADOW E&M-EST. PATIENT-LVL III: CPT | Mod: PBBFAC,,, | Performed by: ORTHOPAEDIC SURGERY

## 2019-04-02 PROCEDURE — 99999 PR PBB SHADOW E&M-EST. PATIENT-LVL III: ICD-10-PCS | Mod: PBBFAC,,, | Performed by: ORTHOPAEDIC SURGERY

## 2019-04-02 PROCEDURE — 99214 OFFICE O/P EST MOD 30 MIN: CPT | Mod: 25,S$GLB,, | Performed by: ORTHOPAEDIC SURGERY

## 2019-04-02 RX ORDER — DEXAMETHASONE SODIUM PHOSPHATE 4 MG/ML
4 INJECTION, SOLUTION INTRA-ARTICULAR; INTRALESIONAL; INTRAMUSCULAR; INTRAVENOUS; SOFT TISSUE
Status: DISCONTINUED | OUTPATIENT
Start: 2019-04-02 | End: 2019-04-02 | Stop reason: HOSPADM

## 2019-04-02 RX ADMIN — DEXAMETHASONE SODIUM PHOSPHATE 4 MG: 4 INJECTION, SOLUTION INTRA-ARTICULAR; INTRALESIONAL; INTRAMUSCULAR; INTRAVENOUS; SOFT TISSUE at 01:04

## 2019-04-02 NOTE — PROCEDURES
Intermediate Joint Aspiration/Injection: R radiocarpal  Date/Time: 4/2/2019 1:51 PM  Performed by: Zackery Copeland MD  Authorized by: Zackery Copeland MD     Consent Done?:  Yes (Verbal)  Indications:  Pain, joint swelling and diagnostic evaluation  Site marked: The procedure site was marked    Timeout: Prior to procedure the correct patient, procedure, and site was verified      Location:  Wrist  Site:  R radiocarpal  Prep: Patient was prepped and draped in usual sterile fashion    Ultrasonic Guidance for needle placement: No  Needle size:  22 G  Approach:  Dorsal  Medications:  4 mg dexamethasone 4 mg/mL  Aspirate amount (ml):  0  Patient tolerance:  Patient tolerated the procedure well with no immediate complications

## 2019-04-02 NOTE — PROGRESS NOTES
History & Physical  Orthopedics    SUBJECTIVE:      Chief Complaint: R wrist pain    Referring Provider: none      History of Present Illness:  Patient is a 68 y.o. right hand dominant male with h/o Afib s/p ablation, CKD, gout who presents today with complaints of R wrist pain.     The patient is a/an retired .  Likes to play golf.    Onset of symptoms/DOI was 5 weeks ago - no injury.  He thinks it started after eating shellfish.    Symptoms are aggravated by movement.    Symptoms are alleviated by rest.    Symptoms consist of pain, swelling, erythema and decreased ROM.    The patient rates their pain as a 5/10.    Attempted treatment(s) and/or interventions include PO prednisone, steroid injection.     The patient denies any fevers, chills, N/V, D/C and presents for evaluation.    He has had gout in the R wrist before.  He thinks this is gout.  He has a strong h/o gout.       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    s/p resection, [facility]    Kidney stone     Prediabetes     Vitreous floaters      Past Surgical History:   Procedure Laterality Date    BIOPSY, BLADDER N/A 11/27/2018    Performed by [facility] at [facility] OR 1ST FLR    CARDIAC SURGERY      CHOLECYSTECTOMY      5-2012    COLONOSCOPY N/A 11/24/2015    Performed by Emma Dhillon MD at [facility] ENDO (4TH FLR)    CYSTOSCOPY  11/27/2018    Performed by [facility]og[facility]era MD at [facility] OR 1ST FLR    EXTRACORPOREAL SHOCK WAVE LITHOTRIPSY  2007 and 2008    litho      LIVER RESECTION  5-2012    Distal pancreatectomy    LIVER RESECTION  1-22-15    Hepatic cholangiocarcinoma    MITRAL VALVE SURGERY       PLACEMENT-STENT Right 11/27/2018    Performed by [facility] at [facility] OR 1ST FLR    REMOVAL-PORT-A-CATH N/A 2/22/2016    Performed by [facility] Diagnostic Provider at [facility] OR 2ND FLR     Review of patient's allergies indicates:   Allergen Reactions    Iodine and iodide containing products Rash     5/22/18 premedicated prenednsone 50 mg po X3 and benadryl 50 mg po tablet  Prior to  Ct scan.  7/3/18- As per pt last scan 6 weeks ago took 13 hr prep med prednisone 50 mg & benadryl 50 mg tablet & was tolerated well post scan    Iohexol Hives and Rash     Rash on L leg, resolves in 1 hour. Pt did well with 13hr prep of Prednisone 50mg POx3 doses and Benadryl 50mg PO during CT done back in 2/2017.      Social History     Social History Narrative    Not on file     Family History   Problem Relation Age of Onset    Hypertension Mother     Cataracts Father          Current Outpatient Medications:     allopurinol (ZYLOPRIM) 100 MG tablet, TAKE 1 TABLET (100 MG TOTAL) BY MOUTH ONCE DAILY., Disp: 90 tablet, Rfl: 3    atorvastatin (LIPITOR) 10 MG tablet, TAKE 1 TABLET (10 MG TOTAL) BY MOUTH ONCE DAILY., Disp: 90 tablet, Rfl: 2    cholecalciferol, vitamin D3, (VITAMIN D3) 1,000 unit capsule, Take 5,000 mg by mouth., Disp: , Rfl:     felodipine (PLENDIL) 5 MG 24 hr tablet, Take 1 tablet (5 mg total) by mouth 2 (two) times daily., Disp: 60 tablet, Rfl: 11    metoprolol succinate (TOPROL-XL) 100 MG 24 hr tablet, Take 1 tablet (100 mg total) by mouth once daily., Disp: 60 tablet, Rfl: 6    MULTIVIT-IRON-MIN-FOLIC ACID 3,500-18-0.4 UNIT-MG-MG ORAL CHEW, Take by mouth., Disp: , Rfl:     multivit-min-FA-lycopen-lutein 0.4-300-250 mg-mcg-mcg Tab, Take by mouth., Disp: , Rfl:     omeprazole (PRILOSEC) 20 MG capsule, Take 1 capsule (20 mg total) by mouth once daily., Disp: 90 capsule, Rfl: 3    ondansetron (ZOFRAN) 8 MG tablet, Take 1 tablet (8 mg total) by mouth 4 (four) times daily as needed for Nausea., Disp: 60  "tablet, Rfl: 2    predniSONE (DELTASONE) 50 MG Tab, TAKE BY MOUTH 1 TABLET 13 HOURS, 7 HOURS AND 1 HOUR PRIOR TO IMAGING, Disp: , Rfl: 2    tamsulosin (FLOMAX) 0.4 mg Cap, Take 1 capsule (0.4 mg total) by mouth once daily., Disp: 60 capsule, Rfl: 6    traMADol (ULTRAM) 50 mg tablet, Take 1 tablet (50 mg total) by mouth every 12 (twelve) hours as needed for Pain., Disp: 60 tablet, Rfl: 2      Review of Systems:  Constitutional: no fever or chills  Eyes: no visual changes  ENT: no nasal congestion or sore throat  Respiratory: no cough or shortness of breath  Cardiovascular: no chest pain  Gastrointestinal: no nausea or vomiting, tolerating diet  Musculoskeletal: pain    OBJECTIVE:      Vital Signs (Most Recent):  Vitals:    04/02/19 1218   BP: (!) 144/84   Pulse: 74   Weight: 85.7 kg (189 lb)   Height: 5' 9" (1.753 m)     Body mass index is 27.91 kg/m².      Physical Exam:  Constitutional: The patient appears well-developed and well-nourished. No distress.   Head: Normocephalic and atraumatic.   Nose: Nose normal.   Eyes: Conjunctivae and EOM are normal.   Neck: No tracheal deviation present.   Cardiovascular: Normal rate and intact distal pulses.    Pulmonary/Chest: Effort normal. No respiratory distress.   Abdominal: There is no guarding.   Neurological: The patient is alert.   Psychiatric: The patient has a normal mood and affect.     Right Hand/Wrist Examination:    Observation/Inspection:  Swelling  Ulnar styloid    Deformity  none  Discoloration  Mild erythema ulnar wrist    Scars   none    Atrophy  none    HAND/WRIST EXAMINATION:  Finkelstein's Test   Neg  Snuff box tenderness   Neg  Collier's Test    Neg  Hook of Hamate Tenderness  Neg  CMC grind    Neg  Circumduction test   Neg    Neurovascular Exam:  Digits WWP, brisk CR < 3s throughout  NVI motor/LTS to M/R/U nerves, radial pulse 2+  Tinel's Test - Carpal Tunnel  Neg  Tinel's Test - Cubital Tunnel  Pos  Phalen's Test    Neg  Median Nerve Compression " Test Neg    TTP ulnar styloid and wrist joint  ROM wrist with significant pain  ROM elbow full, painless      Diagnostic Results:     Xray R wrist negative for fx or dislocation.  EMG - none    ASSESSMENT/PLAN:      68 y.o. yo male with R wrist gout  1) Wrist aspiration attempted but unable to obtain fluid.  2) Wrist injected with 4mg dexamethasone, 1cc 1% lidocaine.  3) F/u prn if condition worsens.    Procedure note:  After verbal consent was obtained, patient's R wrist was prepped with betadine and chlorhexidine.  A 22 gauge needle was used to aspirate the wrist joint using a dorsoradial (3-4) approach.  No fluid was able to be aspirated.  Wrist was then injected with 4cc dexamethasone, 1cc 1% lidocaine.  Area was cleansed and dressed.  Patient tolerated procedure with no complications and minimal blood loss.

## 2019-04-03 ENCOUNTER — ANESTHESIA EVENT (OUTPATIENT)
Dept: SURGERY | Facility: HOSPITAL | Age: 69
End: 2019-04-03
Payer: MEDICARE

## 2019-04-03 NOTE — ANESTHESIA PREPROCEDURE EVALUATION
Anesthesia Assessment: Preoperative EQUATION     Planned Procedure: Procedure(s) (LRB):  REPLACEMENT, STENT (Right)  Requested Anesthesia Type:Monitor Anesthesia Care  Surgeon: Rodger Noguera MD  Service: Urology  Known or anticipated Date of Surgery:4/16/2019     Surgeon notes: reviewed     Electronic QUestionnaire Assessment completed via nurse interview with patient.         No Aq           Triage considerations:            Previous anesthesia records:GETA  11/27/18     Last PCP note: within 3 months , within [facility] Dr. Beyer  Subspecialty notes: Hematology/Oncology, Neurology, sports medicine// urology     Other important co-morbidities:           Diagnosis Date    A-fib               States resolved -- 14 yrs ago       s/p ablation currently sinus rhythm    BPH (benign prostatic hypertrophy) with urinary obstruction      Cataract      Cholangiocarcinoma of liver 1-22-15    Gout, chronic      Heart disease       Mitral valve    Hx of colonic polyps      Hyperlipidemia      Hypertension      Intrahepatic cholangiocarcinoma 5-2-12     s/p resection, [facility] at [facility]    Kidney stone      Prediabetes      Vitreous floaters         hydronephrosis     No current facility-administered medications on file prior to encounter.      Current Outpatient Medications on File Prior to Encounter   Medication Sig Dispense Refill    allopurinol (ZYLOPRIM) 100 MG tablet TAKE 1 TABLET (100 MG TOTAL) BY MOUTH ONCE DAILY. 90 tablet 3    atorvastatin (LIPITOR) 10 MG tablet TAKE 1 TABLET (10 MG TOTAL) BY MOUTH ONCE DAILY. 90 tablet 2    cholecalciferol, vitamin D3, (VITAMIN D3) 1,000 unit capsule Take 5,000 mg by mouth.      felodipine (PLENDIL) 5 MG 24 hr tablet Take 1 tablet (5 mg total) by mouth 2 (two) times daily. 60 tablet 11    metoprolol succinate (TOPROL-XL) 100 MG 24 hr tablet Take 1 tablet (100 mg total) by mouth once daily. 60 tablet 6    MULTIVIT-IRON-MIN-FOLIC ACID 3,500-18-0.4 UNIT-MG-MG  ORAL CHEW Take by mouth.      omeprazole (PRILOSEC) 20 MG capsule Take 1 capsule (20 mg total) by mouth once daily. 90 capsule 3    ondansetron (ZOFRAN) 8 MG tablet Take 1 tablet (8 mg total) by mouth 4 (four) times daily as needed for Nausea. 60 tablet 2    predniSONE (DELTASONE) 50 MG Tab TAKE BY MOUTH 1 TABLET 13 HOURS, 7 HOURS AND 1 HOUR PRIOR TO IMAGING  2    tamsulosin (FLOMAX) 0.4 mg Cap Take 1 capsule (0.4 mg total) by mouth once daily. 60 capsule 6    traMADol (ULTRAM) 50 mg tablet Take 1 tablet (50 mg total) by mouth every 12 (twelve) hours as needed for Pain. 60 tablet 2    multivit-min-FA-lycopen-lutein 0.4-300-250 mg-mcg-mcg Tab Take by mouth.            Tests already available:  Available tests,  within 3 months , within [facility] .      EKG 12-lead   Order: [id number]   Status:  Final result   Visible to patient:  Yes (Patient Portal) Next appt:  04/10/2019 at 10:10 AM in Lab (LAB, [facility] CANCER [facility]) Dx:  Preop testing       Narrative   Performed by: GEMUSE   Test Reason : Z01.818,  Blood Pressure  Vent. Rate : 059 BPM     Atrial Rate : 059 BPM     P-R Int : 186 ms          QRS Dur : 086 ms      QT Int : 452 ms       P-R-T Axes : 058 001 041 degrees     QTc Int : 447 ms    Sinus bradycardia  Otherwise normal ECG  No previous ECGs available  Confirmed by Dayna Root MD ([id number]) on 11/23/2018 5:07:29 PM    Referred By: DANIEL GAMBINO           Confirmed By:Dayna Root MD      Specimen Collected: 11/23/18 08:28 Last Resulted: 11/23/18 17:07                                         Instructions given. (See in Nurse's note)     Optimization:  Seen by PCP in february      Plan:    Testing:  none   Pre-anesthesia  visit                                        Visit focus: none                           Consultation:messaging pcp of procedure                             Navigation:  Straight Line to surgery.                          No tests, anesthesia preop clinic visit, or consult required.                                                    Plans per surgeon and Follow-up per Surgeon                                                                                                                     04/03/2019  Eric Brown is a 68 y.o., male.    Anesthesia Evaluation         Review of Systems  Anesthesia Hx:  No problems with previous Anesthesia History of prior surgery of interest to airway management or planning: heart surgery. Previous anesthesia: General uro stent placed 11/23/18 with general anesthesia.    Social:  Social Alcohol Use, Former Smoker    Hematology/Oncology:  Hematology Normal      Current/Recent Cancer. Oncology Comments: Intrahepatic cholangiocarcinoma  Secondary lung cancer  Secondary adenocarcinoma of retroperitoneum  Bladder tumor    Cardiovascular:   Exercise tolerance: good Hypertension hyperlipidemia    Pulmonary:   Hx lung cancer   Renal/:   Chronic Renal Disease BPH BPH with obstruction  hydronephrosis   Hepatic/GI:   Liver Disease, Cholangiocarcinoma   Musculoskeletal:   Gout  Rt shouldar pain  Wrist pain   Neurological:  Neurology Normal    Endocrine:   Pre-diabetic   Dermatological:  Skin Normal    Psych:  Psychiatric Normal           Physical Exam  General:  Well nourished    Airway/Jaw/Neck:  Airway Findings: Mouth Opening: Normal Tongue: Normal  General Airway Assessment: Adult  Mallampati: II  Improves to II with phonation.  TM Distance: Normal, at least 6 cm      Dental:  Dental Findings: In tact        Mental Status:  Mental Status Findings:  Cooperative, Alert and Oriented         Anesthesia Plan  Type of Anesthesia, risks & benefits discussed:  Anesthesia Type:  general  Patient's Preference:   Intra-op Monitoring Plan: standard ASA monitors  Intra-op Monitoring Plan Comments:   Post Op Pain Control Plan: multimodal analgesia  Post Op Pain Control Plan Comments:   Induction:   IV  Beta Blocker:  Patient is on a Beta-Blocker and has received one dose within the past 24  hours (No further documentation required).       Informed Consent: Patient understands risks and agrees with Anesthesia plan.  Questions answered. Anesthesia consent signed with patient.  ASA Score: 3     Day of Surgery Review of History & Physical:    H&P update referred to the surgeon.         Ready For Surgery From Anesthesia Perspective.

## 2019-04-03 NOTE — PRE ADMISSION SCREENING
Anesthesia Assessment: Preoperative EQUATION    Planned Procedure: Procedure(s) (LRB):  REPLACEMENT, STENT (Right)  Requested Anesthesia Type:Monitor Anesthesia Care  Surgeon: Rodger Noguera MD  Service: Urology  Known or anticipated Date of Surgery:4/16/2019    Surgeon notes: reviewed    Electronic QUestionnaire Assessment completed via nurse interview with patient.        No Aq        Triage considerations:         Previous anesthesia records:GETA  11/27/18    Last PCP note: within 3 months , within 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     EKG 12-lead   Order: [id number]   Status:  Final result   Visible to patient:  Yes (Patient Portal) Next appt:  04/10/2019 at 10:10 AM [facility] Dx:  Preop testing      Narrative   Performed by: GEMUSE   Test Reason : Z01.818,  Blood Pressure  Vent. Rate : 059 BPM     Atrial Rate : 059 BPM     P-R Int : 186 ms          QRS Dur : 086 ms      QT Int : 452 ms       P-R-T Axes : 058 001 041 degrees     QTc Int : 447 ms    Sinus bradycardia  Otherwise normal ECG  No previous ECGs available  Confirmed by Dayna Root MD ([id number]) on 11/23/2018 5:07:29 PM    Referred By: DANIEL GAMBINO           Confirmed By:Dayna Root MD      Specimen Collected: 11/23/18 08:28 Last  Resulted: 11/23/18 17:07                       Instructions given. (See in Nurse's note)    Optimization:  Seen by PCP in february    Plan:    Testing:  none   Pre-anesthesia  visit       Visit focus: none     Consultation:messaging pcp of procedure       Navigation:  Straight Line to surgery.               No tests, anesthesia preop clinic visit, or consult required.                  Plans per surgeon and Follow-up per Surgeon

## 2019-04-04 ENCOUNTER — TELEPHONE (OUTPATIENT)
Dept: OPHTHALMOLOGY | Facility: CLINIC | Age: 69
End: 2019-04-04

## 2019-04-04 DIAGNOSIS — H25.11 NUCLEAR SCLEROTIC CATARACT OF RIGHT EYE: Primary | ICD-10-CM

## 2019-04-09 ENCOUNTER — OFFICE VISIT (OUTPATIENT)
Dept: SPORTS MEDICINE | Facility: CLINIC | Age: 69
End: 2019-04-09
Payer: MEDICARE

## 2019-04-09 VITALS
WEIGHT: 189 LBS | SYSTOLIC BLOOD PRESSURE: 148 MMHG | HEART RATE: 69 BPM | DIASTOLIC BLOOD PRESSURE: 89 MMHG | HEIGHT: 69 IN | BODY MASS INDEX: 27.99 KG/M2

## 2019-04-09 DIAGNOSIS — M25.531 RIGHT WRIST PAIN: Primary | ICD-10-CM

## 2019-04-09 PROCEDURE — 99214 OFFICE O/P EST MOD 30 MIN: CPT | Mod: 25,S$GLB,, | Performed by: ORTHOPAEDIC SURGERY

## 2019-04-09 PROCEDURE — 99999 PR PBB SHADOW E&M-EST. PATIENT-LVL III: ICD-10-PCS | Mod: PBBFAC,,, | Performed by: ORTHOPAEDIC SURGERY

## 2019-04-09 PROCEDURE — 99214 PR OFFICE/OUTPT VISIT, EST, LEVL IV, 30-39 MIN: ICD-10-PCS | Mod: 25,S$GLB,, | Performed by: ORTHOPAEDIC SURGERY

## 2019-04-09 PROCEDURE — 20605 DRAIN/INJ JOINT/BURSA W/O US: CPT | Mod: RT,S$GLB,, | Performed by: ORTHOPAEDIC SURGERY

## 2019-04-09 PROCEDURE — 20605 INTERMEDIATE JOINT ASPIRATION/INJECTION: R RADIOCARPAL: ICD-10-PCS | Mod: RT,S$GLB,, | Performed by: ORTHOPAEDIC SURGERY

## 2019-04-09 PROCEDURE — 99999 PR PBB SHADOW E&M-EST. PATIENT-LVL III: CPT | Mod: PBBFAC,,, | Performed by: ORTHOPAEDIC SURGERY

## 2019-04-09 RX ORDER — TRIAMCINOLONE ACETONIDE 40 MG/ML
40 INJECTION, SUSPENSION INTRA-ARTICULAR; INTRAMUSCULAR
Status: DISCONTINUED | OUTPATIENT
Start: 2019-04-09 | End: 2019-04-09 | Stop reason: HOSPADM

## 2019-04-09 RX ORDER — METHYLPREDNISOLONE 4 MG/1
TABLET ORAL
Qty: 1 PACKAGE | Refills: 0 | Status: SHIPPED | OUTPATIENT
Start: 2019-04-09 | End: 2019-04-30

## 2019-04-09 RX ADMIN — TRIAMCINOLONE ACETONIDE 40 MG: 40 INJECTION, SUSPENSION INTRA-ARTICULAR; INTRAMUSCULAR at 01:04

## 2019-04-09 NOTE — PROCEDURES
Intermediate Joint Aspiration/Injection: R radiocarpal  Date/Time: 4/9/2019 1:07 PM  Performed by: Nathan Fowler MD  Authorized by: Nathan Fowler MD     Consent Done?:  Yes (Verbal)  Indications:  Pain and joint swelling    Location:  Wrist  Site:  R radiocarpal  Ultrasonic Guidance for needle placement: No  Needle size:  25 G  Medications:  40 mg triamcinolone acetonide 40 mg/mL

## 2019-04-09 NOTE — PROGRESS NOTES
History & Physical  Orthopedics    SUBJECTIVE:      Chief Complaint: R wrist pain    Referring Provider: none    Interval (4/9/19):  CSI provided no relief.  Pain has worsened in the past few days.  He has been taking tramadol for pain.  Pain is mostly ulnar sided near ulnar styloid.  He does have some pain laterally over hypothenar eminence as well.  Denies fever, chills.    History of Present Illness (4/2/19):  Patient is a 68 y.o. right hand dominant male with h/o Afib s/p ablation, CKD, gout who presents today with complaints of R wrist pain.     The patient is a/an retired .  Likes to play golf.    Onset of symptoms/DOI was 5 weeks ago - no injury.  He thinks it started after eating shellfish.    Symptoms are aggravated by movement.    Symptoms are alleviated by rest.    Symptoms consist of pain, swelling, erythema and decreased ROM.    The patient rates their pain as a 5/10.    Attempted treatment(s) and/or interventions include PO prednisone, steroid injection.     The patient denies any fevers, chills, N/V, D/C and presents for evaluation.    He has had gout in the R wrist before.  He thinks this is gout.  He has a strong h/o gout.       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    s/p resection, [facility]    Kidney stone     Prediabetes     Vitreous floaters      Past Surgical History:   Procedure Laterality Date    BIOPSY, BLADDER N/A 11/27/2018    Performed by [facility] at [facility] OR 1ST FLR    CARDIAC SURGERY      CHOLECYSTECTOMY      5-2012    COLONOSCOPY N/A 11/24/2015    Performed by Emma Dhillon MD at [facility] ENDO (4TH FLR)    CYSTOSCOPY   11/27/2018    Performed by [facility]era [facility] at [facility] OR 1ST FLR    EXTRACORPOREAL SHOCK WAVE LITHOTRIPSY  2007 and 2008    litho      LIVER RESECTION  5-2012    Distal pancreatectomy    LIVER RESECTION  1-22-15    Hepatic cholangiocarcinoma    MITRAL VALVE SURGERY      PLACEMENT-STENT Right 11/27/2018    Performed by [facility] at [facility] OR 1ST FLR    REMOVAL-PORT-A-CATH N/A 2/22/2016    Performed by [facility] Diagnostic Provider at [facility] OR 2ND FLR     Review of patient's allergies indicates:   Allergen Reactions    Iodine and iodide containing products Rash     5/22/18 premedicated prenednsone 50 mg po X3 and benadryl 50 mg po tablet  Prior to  Ct scan.  7/3/18- As per pt last scan 6 weeks ago took 13 hr prep med prednisone 50 mg & benadryl 50 mg tablet & was tolerated well post scan    Iohexol Hives and Rash     Rash on L leg, resolves in 1 hour. Pt did well with 13hr prep of Prednisone 50mg POx3 doses and Benadryl 50mg PO during CT done back in 2/2017.      Social History     Social History Narrative    Not on file     Family History   Problem Relation Age of Onset    Hypertension Mother     Cataracts Father          Current Outpatient Medications:     allopurinol (ZYLOPRIM) 100 MG tablet, TAKE 1 TABLET (100 MG TOTAL) BY MOUTH ONCE DAILY., Disp: 90 tablet, Rfl: 3    atorvastatin (LIPITOR) 10 MG tablet, TAKE 1 TABLET (10 MG TOTAL) BY MOUTH ONCE DAILY., Disp: 90 tablet, Rfl: 2    cholecalciferol, vitamin D3, (VITAMIN D3) 1,000 unit capsule, Take 5,000 mg by mouth., Disp: , Rfl:     felodipine (PLENDIL) 5 MG 24 hr tablet, Take 1 tablet (5 mg total) by mouth 2 (two) times daily., Disp: 60 tablet, Rfl: 11    metoprolol succinate (TOPROL-XL) 100 MG 24 hr tablet, Take 1 tablet (100 mg total) by mouth once daily., Disp: 60 tablet, Rfl: 6    MULTIVIT-IRON-MIN-FOLIC ACID 3,500-18-0.4 UNIT-MG-MG ORAL CHEW, Take by mouth., Disp: , Rfl:     multivit-min-FA-lycopen-lutein 0.4-300-250 mg-mcg-mcg Tab, Take  "by mouth., Disp: , Rfl:     omeprazole (PRILOSEC) 20 MG capsule, Take 1 capsule (20 mg total) by mouth once daily., Disp: 90 capsule, Rfl: 3    ondansetron (ZOFRAN) 8 MG tablet, Take 1 tablet (8 mg total) by mouth 4 (four) times daily as needed for Nausea., Disp: 60 tablet, Rfl: 2    predniSONE (DELTASONE) 50 MG Tab, TAKE BY MOUTH 1 TABLET 13 HOURS, 7 HOURS AND 1 HOUR PRIOR TO IMAGING, Disp: , Rfl: 2    tamsulosin (FLOMAX) 0.4 mg Cap, Take 1 capsule (0.4 mg total) by mouth once daily., Disp: 60 capsule, Rfl: 6    traMADol (ULTRAM) 50 mg tablet, Take 1 tablet (50 mg total) by mouth every 12 (twelve) hours as needed for Pain., Disp: 60 tablet, Rfl: 2      Review of Systems:  Constitutional: no fever or chills  Eyes: no visual changes  ENT: no nasal congestion or sore throat  Respiratory: no cough or shortness of breath  Cardiovascular: no chest pain  Gastrointestinal: no nausea or vomiting, tolerating diet  Musculoskeletal: pain    OBJECTIVE:      Vital Signs (Most Recent):  Vitals:    04/09/19 0824   BP: (!) 148/89   Pulse: 69   Weight: 85.7 kg (189 lb)   Height: 5' 9" (1.753 m)     Body mass index is 27.91 kg/m².      Physical Exam:  Constitutional: The patient appears well-developed and well-nourished. No distress.   Head: Normocephalic and atraumatic.   Nose: Nose normal.   Eyes: Conjunctivae and EOM are normal.   Neck: No tracheal deviation present.   Cardiovascular: Normal rate and intact distal pulses.    Pulmonary/Chest: Effort normal. No respiratory distress.   Abdominal: There is no guarding.   Neurological: The patient is alert.   Psychiatric: The patient has a normal mood and affect.     Right Hand/Wrist Examination:    Observation/Inspection:  Swelling  Ulnar styloid    Deformity  none  Discoloration  Mild erythema ulnar wrist    Scars   none    Atrophy  none    HAND/WRIST EXAMINATION:  Finkelstein's Test   Neg  Snuff box tenderness   Neg  Collier's Test    Neg  Hook of Hamate Tenderness  Neg  CMC " grind    Neg  Circumduction test   Neg    Neurovascular Exam:  Digits WWP, brisk CR < 3s throughout  NVI motor/LTS to M/R/U nerves, radial pulse 2+  Tinel's Test - Carpal Tunnel  Neg  Tinel's Test - Cubital Tunnel  Pos  Phalen's Test    Neg  Median Nerve Compression Test Neg    TTP ulnar styloid and wrist joint  ROM wrist with significant pain  ROM elbow full, painless      Diagnostic Results:     Xray R wrist negative for fx or dislocation.  EMG - none    ASSESSMENT/PLAN:      68 y.o. yo male with R wrist gout.  1) Repeat wrist CSI  2) Medrol dosepack  3) R wrist brace for comfort  4) Will consider MRI if steroids do not help  5) Call with severe worsening or systemic symptoms

## 2019-04-10 ENCOUNTER — TELEPHONE (OUTPATIENT)
Dept: HEMATOLOGY/ONCOLOGY | Facility: CLINIC | Age: 69
End: 2019-04-10

## 2019-04-10 ENCOUNTER — LAB VISIT (OUTPATIENT)
Dept: LAB | Facility: HOSPITAL | Age: 69
End: 2019-04-10
Attending: INTERNAL MEDICINE
Payer: MEDICARE

## 2019-04-10 DIAGNOSIS — C22.1 INTRAHEPATIC CHOLANGIOCARCINOMA: ICD-10-CM

## 2019-04-10 LAB
ALBUMIN SERPL BCP-MCNC: 3.5 G/DL (ref 3.5–5.2)
ALP SERPL-CCNC: 93 U/L (ref 55–135)
ALT SERPL W/O P-5'-P-CCNC: 23 U/L (ref 10–44)
ANION GAP SERPL CALC-SCNC: 10 MMOL/L (ref 8–16)
AST SERPL-CCNC: 15 U/L (ref 10–40)
BILIRUB SERPL-MCNC: 0.4 MG/DL (ref 0.1–1)
BUN SERPL-MCNC: 23 MG/DL (ref 8–23)
CALCIUM SERPL-MCNC: 9.9 MG/DL (ref 8.7–10.5)
CHLORIDE SERPL-SCNC: 99 MMOL/L (ref 95–110)
CO2 SERPL-SCNC: 23 MMOL/L (ref 23–29)
CREAT SERPL-MCNC: 1.6 MG/DL (ref 0.5–1.4)
ERYTHROCYTE [DISTWIDTH] IN BLOOD BY AUTOMATED COUNT: 15.7 % (ref 11.5–14.5)
EST. GFR  (AFRICAN AMERICAN): 50.1 ML/MIN/1.73 M^2
EST. GFR  (NON AFRICAN AMERICAN): 43.3 ML/MIN/1.73 M^2
GLUCOSE SERPL-MCNC: 200 MG/DL (ref 70–110)
HCT VFR BLD AUTO: 32.2 % (ref 40–54)
HGB BLD-MCNC: 10.5 G/DL (ref 14–18)
IMM GRANULOCYTES # BLD AUTO: 0.09 K/UL (ref 0–0.04)
MCH RBC QN AUTO: 31 PG (ref 27–31)
MCHC RBC AUTO-ENTMCNC: 32.6 G/DL (ref 32–36)
MCV RBC AUTO: 95 FL (ref 82–98)
NEUTROPHILS # BLD AUTO: 7.2 K/UL (ref 1.8–7.7)
PLATELET # BLD AUTO: 230 K/UL (ref 150–350)
PMV BLD AUTO: 9.6 FL (ref 9.2–12.9)
POTASSIUM SERPL-SCNC: 4.7 MMOL/L (ref 3.5–5.1)
PROT SERPL-MCNC: 7.9 G/DL (ref 6–8.4)
RBC # BLD AUTO: 3.39 M/UL (ref 4.6–6.2)
SODIUM SERPL-SCNC: 132 MMOL/L (ref 136–145)
WBC # BLD AUTO: 8.07 K/UL (ref 3.9–12.7)

## 2019-04-10 PROCEDURE — 36415 COLL VENOUS BLD VENIPUNCTURE: CPT

## 2019-04-10 PROCEDURE — 80053 COMPREHEN METABOLIC PANEL: CPT

## 2019-04-10 PROCEDURE — 85027 COMPLETE CBC AUTOMATED: CPT

## 2019-04-10 NOTE — TELEPHONE ENCOUNTER
Left message letting patient know that his labs are stable. I have released them for him to review and will send them to his MD at [facility]. Let him know to call if he has any other questions.

## 2019-04-15 ENCOUNTER — TELEPHONE (OUTPATIENT)
Dept: UROLOGY | Facility: CLINIC | Age: 69
End: 2019-04-15

## 2019-04-15 NOTE — TELEPHONE ENCOUNTER
Called pt to confirm arrival time 515am for procedure. Gave pt NPO instructions and gave pt opportunity to ask questions. Pt verbalized understanding.

## 2019-04-16 ENCOUNTER — HOSPITAL ENCOUNTER (OUTPATIENT)
Facility: HOSPITAL | Age: 69
Discharge: HOME | End: 2019-04-16
Attending: UROLOGY | Admitting: UROLOGY
Payer: MEDICARE

## 2019-04-16 ENCOUNTER — ANESTHESIA (OUTPATIENT)
Dept: SURGERY | Facility: HOSPITAL | Age: 69
End: 2019-04-16
Payer: MEDICARE

## 2019-04-16 ENCOUNTER — TELEPHONE (OUTPATIENT)
Dept: PEDIATRIC UROLOGY | Facility: CLINIC | Age: 69
End: 2019-04-16

## 2019-04-16 VITALS
WEIGHT: 190 LBS | OXYGEN SATURATION: 100 % | TEMPERATURE: 98 F | HEART RATE: 52 BPM | HEIGHT: 69 IN | RESPIRATION RATE: 16 BRPM | BODY MASS INDEX: 28.14 KG/M2 | SYSTOLIC BLOOD PRESSURE: 120 MMHG | DIASTOLIC BLOOD PRESSURE: 66 MMHG

## 2019-04-16 DIAGNOSIS — N13.30 HYDRONEPHROSIS: Primary | ICD-10-CM

## 2019-04-16 DIAGNOSIS — R31.9 HEMATURIA, UNSPECIFIED TYPE: ICD-10-CM

## 2019-04-16 DIAGNOSIS — C22.1 INTRAHEPATIC CHOLANGIOCARCINOMA: ICD-10-CM

## 2019-04-16 PROCEDURE — 52332 PR CYSTOSCOPY,INSERT URETERAL STENT: ICD-10-PCS | Mod: 51,RT,, | Performed by: UROLOGY

## 2019-04-16 PROCEDURE — 63600175 PHARM REV CODE 636 W HCPCS: Performed by: NURSE ANESTHETIST, CERTIFIED REGISTERED

## 2019-04-16 PROCEDURE — 71000044 HC DOSC ROUTINE RECOVERY FIRST HOUR: Performed by: UROLOGY

## 2019-04-16 PROCEDURE — D9220A PRA ANESTHESIA: Mod: ANES,,, | Performed by: ANESTHESIOLOGY

## 2019-04-16 PROCEDURE — 74420 UROGRAPHY RTRGR +-KUB: CPT | Mod: 26,,, | Performed by: UROLOGY

## 2019-04-16 PROCEDURE — 52351 CYSTOURETERO & OR PYELOSCOPE: CPT | Mod: RT,,, | Performed by: UROLOGY

## 2019-04-16 PROCEDURE — 71000016 HC POSTOP RECOV ADDL HR: Performed by: UROLOGY

## 2019-04-16 PROCEDURE — 25000003 PHARM REV CODE 250: Performed by: NURSE ANESTHETIST, CERTIFIED REGISTERED

## 2019-04-16 PROCEDURE — C1758 CATHETER, URETERAL: HCPCS | Performed by: UROLOGY

## 2019-04-16 PROCEDURE — C2617 STENT, NON-COR, TEM W/O DEL: HCPCS | Performed by: UROLOGY

## 2019-04-16 PROCEDURE — 76000 PR  FLUOROSCOPE EXAMINATION: ICD-10-PCS | Mod: 26,59,, | Performed by: UROLOGY

## 2019-04-16 PROCEDURE — 37000008 HC ANESTHESIA 1ST 15 MINUTES: Performed by: UROLOGY

## 2019-04-16 PROCEDURE — 71000015 HC POSTOP RECOV 1ST HR: Performed by: UROLOGY

## 2019-04-16 PROCEDURE — 76000 FLUOROSCOPY <1 HR PHYS/QHP: CPT | Mod: 26,59,, | Performed by: UROLOGY

## 2019-04-16 PROCEDURE — 36000706: Performed by: UROLOGY

## 2019-04-16 PROCEDURE — D9220A PRA ANESTHESIA: ICD-10-PCS | Mod: CRNA,,, | Performed by: NURSE ANESTHETIST, CERTIFIED REGISTERED

## 2019-04-16 PROCEDURE — C1769 GUIDE WIRE: HCPCS | Performed by: UROLOGY

## 2019-04-16 PROCEDURE — 52332 CYSTOSCOPY AND TREATMENT: CPT | Mod: 51,RT,, | Performed by: UROLOGY

## 2019-04-16 PROCEDURE — 37000009 HC ANESTHESIA EA ADD 15 MINS: Performed by: UROLOGY

## 2019-04-16 PROCEDURE — 36000707: Performed by: UROLOGY

## 2019-04-16 PROCEDURE — 63600175 PHARM REV CODE 636 W HCPCS: Performed by: STUDENT IN AN ORGANIZED HEALTH CARE EDUCATION/TRAINING PROGRAM

## 2019-04-16 PROCEDURE — 25500020 PHARM REV CODE 255: Performed by: UROLOGY

## 2019-04-16 PROCEDURE — D9220A PRA ANESTHESIA: Mod: CRNA,,, | Performed by: NURSE ANESTHETIST, CERTIFIED REGISTERED

## 2019-04-16 PROCEDURE — 63600175 PHARM REV CODE 636 W HCPCS: Performed by: ANESTHESIOLOGY

## 2019-04-16 PROCEDURE — 52351 PR CYSTO/URETERO/PYELOSCOPY, DX: ICD-10-PCS | Mod: RT,,, | Performed by: UROLOGY

## 2019-04-16 PROCEDURE — D9220A PRA ANESTHESIA: ICD-10-PCS | Mod: ANES,,, | Performed by: ANESTHESIOLOGY

## 2019-04-16 PROCEDURE — 74420 PR  X-RAY RETROGRADE PYELOGRAM: ICD-10-PCS | Mod: 26,,, | Performed by: UROLOGY

## 2019-04-16 DEVICE — STENT 6 X 26: Type: IMPLANTABLE DEVICE | Site: URETER | Status: FUNCTIONAL

## 2019-04-16 RX ORDER — PROPOFOL 10 MG/ML
VIAL (ML) INTRAVENOUS
Status: DISCONTINUED | OUTPATIENT
Start: 2019-04-16 | End: 2019-04-16

## 2019-04-16 RX ORDER — TRAMADOL HYDROCHLORIDE 50 MG/1
50 TABLET ORAL EVERY 12 HOURS PRN
Qty: 20 TABLET | Refills: 0 | Status: SHIPPED | OUTPATIENT
Start: 2019-04-16 | End: 2019-06-04

## 2019-04-16 RX ORDER — ONDANSETRON 2 MG/ML
INJECTION INTRAMUSCULAR; INTRAVENOUS
Status: DISCONTINUED | OUTPATIENT
Start: 2019-04-16 | End: 2019-04-16

## 2019-04-16 RX ORDER — SODIUM CHLORIDE 0.9 % (FLUSH) 0.9 %
10 SYRINGE (ML) INJECTION
Status: DISCONTINUED | OUTPATIENT
Start: 2019-04-16 | End: 2019-04-16 | Stop reason: HOSPADM

## 2019-04-16 RX ORDER — MIDAZOLAM HYDROCHLORIDE 1 MG/ML
INJECTION, SOLUTION INTRAMUSCULAR; INTRAVENOUS
Status: DISCONTINUED | OUTPATIENT
Start: 2019-04-16 | End: 2019-04-16

## 2019-04-16 RX ORDER — SODIUM CHLORIDE 9 MG/ML
INJECTION, SOLUTION INTRAVENOUS CONTINUOUS PRN
Status: DISCONTINUED | OUTPATIENT
Start: 2019-04-16 | End: 2019-04-16

## 2019-04-16 RX ORDER — FENTANYL CITRATE 50 UG/ML
25 INJECTION, SOLUTION INTRAMUSCULAR; INTRAVENOUS EVERY 5 MIN PRN
Status: DISCONTINUED | OUTPATIENT
Start: 2019-04-16 | End: 2019-04-16 | Stop reason: HOSPADM

## 2019-04-16 RX ORDER — DEXAMETHASONE SODIUM PHOSPHATE 4 MG/ML
INJECTION, SOLUTION INTRA-ARTICULAR; INTRALESIONAL; INTRAMUSCULAR; INTRAVENOUS; SOFT TISSUE
Status: DISCONTINUED | OUTPATIENT
Start: 2019-04-16 | End: 2019-04-16

## 2019-04-16 RX ORDER — EPHEDRINE SULFATE 50 MG/ML
INJECTION, SOLUTION INTRAVENOUS
Status: DISCONTINUED | OUTPATIENT
Start: 2019-04-16 | End: 2019-04-16

## 2019-04-16 RX ORDER — CEFAZOLIN SODIUM 1 G/3ML
2 INJECTION, POWDER, FOR SOLUTION INTRAMUSCULAR; INTRAVENOUS
Status: COMPLETED | OUTPATIENT
Start: 2019-04-16 | End: 2019-04-16

## 2019-04-16 RX ADMIN — FENTANYL CITRATE 25 MCG: 50 INJECTION, SOLUTION INTRAMUSCULAR; INTRAVENOUS at 07:04

## 2019-04-16 RX ADMIN — ONDANSETRON 4 MG: 2 INJECTION INTRAMUSCULAR; INTRAVENOUS at 07:04

## 2019-04-16 RX ADMIN — EPHEDRINE SULFATE 15 MG: 50 INJECTION, SOLUTION INTRAMUSCULAR; INTRAVENOUS; SUBCUTANEOUS at 07:04

## 2019-04-16 RX ADMIN — CEFAZOLIN 2 G: 330 INJECTION, POWDER, FOR SOLUTION INTRAMUSCULAR; INTRAVENOUS at 07:04

## 2019-04-16 RX ADMIN — PROPOFOL 50 MG: 10 INJECTION, EMULSION INTRAVENOUS at 07:04

## 2019-04-16 RX ADMIN — EPHEDRINE SULFATE 10 MG: 50 INJECTION, SOLUTION INTRAMUSCULAR; INTRAVENOUS; SUBCUTANEOUS at 07:04

## 2019-04-16 RX ADMIN — PROPOFOL 20 MG: 10 INJECTION, EMULSION INTRAVENOUS at 07:04

## 2019-04-16 RX ADMIN — SODIUM CHLORIDE: 0.9 INJECTION, SOLUTION INTRAVENOUS at 06:04

## 2019-04-16 RX ADMIN — FENTANYL CITRATE 50 MCG: 50 INJECTION, SOLUTION INTRAMUSCULAR; INTRAVENOUS at 06:04

## 2019-04-16 RX ADMIN — DEXAMETHASONE SODIUM PHOSPHATE 8 MG: 4 INJECTION, SOLUTION INTRAMUSCULAR; INTRAVENOUS at 07:04

## 2019-04-16 RX ADMIN — PROPOFOL 100 MG: 10 INJECTION, EMULSION INTRAVENOUS at 07:04

## 2019-04-16 RX ADMIN — PROPOFOL 30 MG: 10 INJECTION, EMULSION INTRAVENOUS at 07:04

## 2019-04-16 RX ADMIN — MIDAZOLAM HYDROCHLORIDE 2 MG: 1 INJECTION, SOLUTION INTRAMUSCULAR; INTRAVENOUS at 06:04

## 2019-04-16 NOTE — OP NOTE
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 Right rigid ureteroscopy  2. Cystoscopy with right JJ ureteral stent exchange  3. Retrograde pyelogram on right  4. Fluoroscopy < 1 h  5. Intraoperative independent interpretation of fluoroscopic images    Specimen(s): none    Staff Surgeon: Rodger Noguera MD    Assistant Surgeon: Rodger Ordoñez MD, Cristopher Alonzo MD    Anesthesia: Monitored Local Anesthesia with Sedation    Indications: Eric Brown is a 68 y.o. male with right hydronephrosis and ureteral obstruction due to retroperitoneal lymphadenopathy form cholangiocarcinoma. He currently has 6 Fr x 26 cm JJ ureteral stent in place since 11/2018. He presents today for ureteral stent exchange.    Findings:    - known papillary, low grade appearing, bladder mass visualized medial and posterior to the left ureteral orifice. The patient has been counseled and will continue observation for the tumor given his medical co-morbidities  - no other masses noted  - difficulty advancing wire past the area of obstruction at the proximal ureter  - This area was confirmed on retrograde pyelogram with tortuosity and no contrast seen proximally  - rigid ureteroscopy performed and angled glide wire was  able to be placed under direct vision and passed into the kidney confirmed under fluoroscopy  - 6 Fr x 26 cm right JJ ureteral stent without strings placed    Estimated Blood Loss: min    Drains: 6 Nepalese x 26 cm right JJ ureteral stent without strings    Procedure in Detail:  After risks, benefits and possible complications of the procedure were explained, the patient elected to undergo the procedure and informed consent was obtained. All questions were answered in the eligio-operative area. The patient was transferred to the cystoscopy suite and placed on the fluoroscopy table in the supine position.  SCDs were applied and working. Time out was performed, eligio-procedural antibiotics were given. Anesthesia was administered.  After adequate anesthesia the patient was placed in dorsal lithotomy position and prepped and draped in the usual sterile fashion.     A rigid cystoscope in a 22 Fr sheath was introduced into the patients bladder per urethra. This passed easily.  The entire urethra was visualized and revealed no strictures or masses. Cystoscopy was performed which showed the right and left ureteral orifices in the normal anatomic position. There was a papillary, low grade appearing, bladder mass visualized medial and posterior to the left ureteral orifice. This was similar in size to previous cystoscopy. As discussed previously, will continue observation. There were no trabeculations and no stones.      Our attention was turned to the patient's right ureteral orifice. The stent was grasped with the stent graspers and pulled to the meatus. A guide wire was advanced up the right ureteral orifice and resistance was encountered at the level of the proximal ureter, the guide wire was exchanged for an angle tip glide wire over a 5 Fr ureteral catheter. There was still difficulty passing the wire. A retrograde pyelogram was performed which showed the no contrast proximal to the area of obstruction. The cystoscope was  reinserted and a guide wire was advanced to the area of obstruction. At this point the decision was made to perform ureteroscopy to place the wire under direct vision. The semi-rigid ureteroscope was advanced alongside the wire and 5 Fr, and the guide wire was removed. The scope was advanced to the level of obstruction and a glide wire was inserted through the scope. This was ultimately able to advance through the ureteral lumen and into the kidney confirmed on fluoroscopy. The ureteroscope was removed keeping the glide wire in place. The glide wire was exchanged for a guide wire with a 5 fr open-ended ureteral catheter.    We then passed a 6 Fr x 26 cm JJ ureteral stent without strings over the wire to the level of the renal pelvis under direct vision as well as flouroscopy. The guide wire was removed.  A 180 degree coil was observed in the renal pelvis as well as the bladder using fluoroscopy.  A 180 degree coil was also seen using direct visualization in the bladder.     The bladder was drained and the cystoscope was removed.     The patient tolerated the procedure well and was transferred to the recovery room in stable condition.      Disposition: The patient will follow up with Dr. Noguera in 4 months to discuss future management of stent exchange vs. Nephrostomy tube.    Rodger Ordoñez MD

## 2019-04-16 NOTE — DISCHARGE SUMMARY
[facility]  Discharge Note  Short Stay    Admit Date: 4/16/2019    Discharge Date and Time: 04/16/2019 7:41 AM      Attending Physician: Rodger Noguera MD     Discharge Provider: Rodger Ordoñez MD    Diagnoses:  Active Hospital Problems    Diagnosis  POA    *Hydronephrosis [N13.30]  Yes    Bladder tumor [D49.4]  Yes    Other hydronephrosis [N13.39]  Yes    Left hip pain [M25.552]  Yes    Secondary adenocarcinoma of retroperitoneum [C78.6]  Yes    Secondary lung cancer [C78.00]  Yes    Arm numbness [R20.0]  Yes    Cholangiocarcinoma [C22.1]  Yes    Hypertension [I10]  Yes    Hyperlipidemia [E78.5]  Yes    Benign prostatic hyperplasia with urinary obstruction [N40.1, N13.8]  Yes    Gout, chronic [M1A.9XX0]  Yes    Prediabetes [R73.03]  Yes    Intrahepatic cholangiocarcinoma [C22.1]  Yes     s/p resection, followed at [facility]        Resolved Hospital Problems   No resolved problems to display.       Discharged Condition: stable    Hospital Course: Patient was admitted for cysto, RPG, ureteroscopy, and stent exchange and tolerated the procedure well with no complications. The patient was discharged home in good condition on the same day.       Final Diagnoses: Same as principal problem.    Disposition: Home or Self Care    Follow up/Patient Instructions:    Medications:  Reconciled Home Medications:   Current Discharge Medication List      CONTINUE these medications which have CHANGED    Details   traMADol (ULTRAM) 50 mg tablet Take 1 tablet (50 mg total) by mouth every 12 (twelve) hours as needed for Pain.  Qty: 20 tablet, Refills: 0    Associated Diagnoses: Intrahepatic cholangiocarcinoma         CONTINUE these medications which have NOT CHANGED    Details   allopurinol (ZYLOPRIM) 100 MG tablet TAKE 1 TABLET (100 MG TOTAL) BY MOUTH ONCE DAILY.  Qty: 90 tablet, Refills: 3      atorvastatin (LIPITOR) 10 MG tablet TAKE 1 TABLET (10 MG TOTAL) BY MOUTH ONCE DAILY.  Qty: 90 tablet, Refills: 2       cholecalciferol, vitamin D3, (VITAMIN D3) 1,000 unit capsule Take 5,000 mg by mouth.      felodipine (PLENDIL) 5 MG 24 hr tablet Take 1 tablet (5 mg total) by mouth 2 (two) times daily.  Qty: 60 tablet, Refills: 11      methylPREDNISolone (MEDROL DOSEPACK) 4 mg tablet use as directed  Qty: 1 Package, Refills: 0      metoprolol succinate (TOPROL-XL) 100 MG 24 hr tablet Take 1 tablet (100 mg total) by mouth once daily.  Qty: 60 tablet, Refills: 6    Associated Diagnoses: Essential hypertension      MULTIVIT-IRON-MIN-FOLIC ACID 3,500-18-0.4 UNIT-MG-MG ORAL CHEW Take by mouth.      omeprazole (PRILOSEC) 20 MG capsule Take 1 capsule (20 mg total) by mouth once daily.  Qty: 90 capsule, Refills: 3      ondansetron (ZOFRAN) 8 MG tablet Take 1 tablet (8 mg total) by mouth 4 (four) times daily as needed for Nausea.  Qty: 60 tablet, Refills: 2    Associated Diagnoses: Intrahepatic cholangiocarcinoma      predniSONE (DELTASONE) 50 MG Tab TAKE BY MOUTH 1 TABLET 13 HOURS, 7 HOURS AND 1 HOUR PRIOR TO IMAGING  Refills: 2      tamsulosin (FLOMAX) 0.4 mg Cap Take 1 capsule (0.4 mg total) by mouth once daily.  Qty: 60 capsule, Refills: 6    Associated Diagnoses: Benign prostatic hyperplasia with urinary obstruction      multivit-min-FA-lycopen-lutein 0.4-300-250 mg-mcg-mcg Tab Take by mouth.           Discharge Procedure Orders   Call MD for:  temperature >100.4     Call MD for:  persistent nausea and vomiting or diarrhea     Call MD for:  severe uncontrolled pain     Call MD for:  difficulty breathing or increased cough     Call MD for:  severe persistent headache     Call MD for:  persistent dizziness, light-headedness, or visual disturbances     Call MD for:  increased confusion or weakness     No dressing needed     Activity as tolerated     Follow-up Information     Rodger Noguera MD.    Specialty:  Urology  Why:  Post-op follow up appointment s/p stent exchange  Contact information:  [address]  [facility]  [address]  [phone]                   Discharge Procedure Orders (must include Diet, Follow-up, Activity):   Discharge Procedure Orders (must include Diet, Follow-up, Activity)   Call MD for:  temperature >100.4     Call MD for:  persistent nausea and vomiting or diarrhea     Call MD for:  severe uncontrolled pain     Call MD for:  difficulty breathing or increased cough     Call MD for:  severe persistent headache     Call MD for:  persistent dizziness, light-headedness, or visual disturbances     Call MD for:  increased confusion or weakness     No dressing needed     Activity as tolerated

## 2019-04-16 NOTE — H&P
[facility]  Urology  History & Physical    Patient Name: Eric Brwon  MRN: [id number]  Admission Date: 4/16/2019  Code Status: No Order   Attending Provider: Rodger Noguera MD   Primary Care Physician: Pacheco Beyer MD  Principal Problem:<principal problem not specified>    Subjective:     HPI:     Eric Brown is a 68 y.o. male with right hydronephrosis. He has stage 4 cholangiocarcinoma of the liver.  Currently undergoing experimental chemo at [facility].  Recent CT scan shows R hydro due to what appears to be para aortic lymph node compression.  I reviewed the films personally.    He underwent a R JJ stent on 11/27/18.  At that time, it was noted that he had a bladder tumor as well.  This has been discussed with him, his son, and the treating physician at [facility].  It's being observed.     His Cr has been stable since the JJ stent.  Did not decline much.  Was 1.9 pre-op.  Currently 1.6.        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    s/p resection, followed at [facility]    Kidney stone     Prediabetes     Vitreous floaters        Past Surgical History:   Procedure Laterality Date    BIOPSY, BLADDER N/A 11/27/2018    Performed by Rodger Noguera MD at [facility] OR 1ST FLR    CARDIAC SURGERY      CHOLECYSTECTOMY      5-2012    COLONOSCOPY N/A 11/24/2015    Performed by Emam Dhillon MD at [facility] ENDO (4TH FLR)    CYSTOSCOPY  11/27/2018    Performed by Rodger Noguera MD at [facility] OR 1ST FLR    EXTRACORPOREAL SHOCK WAVE LITHOTRIPSY  2007 and 2008    litho      LIVER RESECTION  5-2012    Distal pancreatectomy    LIVER RESECTION  1-22-15    Hepatic  cholangiocarcinoma    MITRAL VALVE SURGERY      PLACEMENT-STENT Right 2018    Performed by Rodger Noguera MD at [facility] OR 1ST FLR    REMOVAL-PORT-A-CATH N/A 2016    Performed by [facility] Diagnostic Provider at [facility] OR 2ND FLR       Review of patient's allergies indicates:   Allergen Reactions    Iodine and iodide containing products Rash     18 premedicated prenednsone 50 mg po X3 and benadryl 50 mg po tablet  Prior to  Ct scan.  7/3/18- As per pt last scan 6 weeks ago took 13 hr prep med prednisone 50 mg & benadryl 50 mg tablet & was tolerated well post scan    Iohexol Hives and Rash     Rash on L leg, resolves in 1 hour. Pt did well with 13hr prep of Prednisone 50mg POx3 doses and Benadryl 50mg PO during CT done back in 2017.        Family History     Problem Relation (Age of Onset)    Cataracts Father    Hypertension Mother          Tobacco Use    Smoking status: Former Smoker     Last attempt to quit: 1985     Years since quittin.1    Smokeless tobacco: Never Used   Substance and Sexual Activity    Alcohol use: No     Comment: occasion    Drug use: No    Sexual activity: Not on file       Review of Systems  Negative except as noted in HPI    Objective:     Temp:  [97.9 °F (36.6 °C)] 97.9 °F (36.6 °C)  Pulse:  [62] 62  Resp:  [18] 18  SpO2:  [100 %] 100 %  BP: (145)/(86) 145/86     Body mass index is 28.06 kg/m².    No intake/output data recorded.       Lines/Drains/Airways     Peripheral Intravenous Line                 Peripheral IV - Single Lumen 18 0610 Right Wrist 139 days         Peripheral IV - Single Lumen 19 0606 20 G Right Hand less than 1 day                Physical Exam   The patient generally appears in good health, is appropriately interactive, and is in no apparent distress.      Eyes: anicteric sclerae, moist conjunctivae; no lid-lag; PERRLA      HENT: Atraumatic; oropharynx clear with moist mucous membranes and no mucosal ulcerations;normal hard and  soft palate.  No evidence of lymphadenopathy.     Neck: Trachea midline.  No thyromegaly.     Musculoskeletal: No abnormal gait.     Skin: No lesions.     Mental: Cooperative with normal affect.  Is oriented to time, place, and person.     Neuro: Grossly intact.     Chest: Normal inspiratory effort.   No accessory muscles.  No audible wheezes.  Respirations symmetric on inspiration and expiration.     Heart: Regular rhythm.       Abdomen:  Soft, non-tender. No masses or organomegaly. Bladder is not palpable. No evidence of flank discomfort. No evidence of inguinal hernia.     Genitourinary: The penis has no evidence of plaques or induration. The urethral meatus is normal. The testes, epididymides, and cord structures are normal in size and contour bilaterally. The scrotum is normal in size and contour.        Extremities: No clubbing, cyanosis, or edema           Significant Labs:  BMP:  Recent Labs   Lab 04/10/19  1018   *   K 4.7   CL 99   CO2 23   BUN 23   CREATININE 1.6*   CALCIUM 9.9       CBC:  Recent Labs   Lab 04/10/19  1018   WBC 8.07   HGB 10.5*   HCT 32.2*          Urine dipstick today - negative for all 3 components      Assessment and Plan:   OR today for cystoscopy, R JJ stent exchange    Active Diagnoses:    Diagnosis Date Noted POA    Hydronephrosis [N13.30] 04/16/2019 Yes      Problems Resolved During this Admission:       VTE Risk Mitigation (From admission, onward)        Ordered     IP VTE HIGH RISK PATIENT  Once      04/16/19 0540     Place sequential compression device  Until discontinued      04/16/19 0540          Cristopher Alonzo MD  Urology  [facility]

## 2019-04-16 NOTE — TRANSFER OF CARE
"Anesthesia Transfer of Care Note    Patient: Eric Brown    Procedure(s) Performed: Procedure(s) (LRB):  REPLACEMENT, STENT (Right)  CYSTOSCOPY (Right)  PYELOGRAM, RETROGRADE (Right)  URETEROSCOPY (Right)    Patient location: PACU    Anesthesia Type: general    Transport from OR: Transported from OR on 6-10 L/min O2 by face mask with adequate spontaneous ventilation    Post pain: adequate analgesia    Post assessment: no apparent anesthetic complications and tolerated procedure well    Post vital signs: stable    Level of consciousness: lethargic    Nausea/Vomiting: no nausea/vomiting    Complications: none    Transfer of care protocol was followed      Last vitals:   Visit Vitals  BP (!) 99/57 (BP Location: Left arm, Patient Position: Lying)   Pulse (!) 53   Temp 36.4 °C (97.5 °F) (Temporal)   Resp 14   Ht 5' 9" (1.753 m)   Wt 86.2 kg (190 lb)   SpO2 96%   BMI 28.06 kg/m²     "

## 2019-04-16 NOTE — DISCHARGE INSTRUCTIONS
Ureteral Stents  A ureteral stent is a soft plastic tube with holes in it. Its temporarily inserted into a ureter to help drain urine into the bladder. One end goes in the kidney. The other end goes in the bladder. A coil on each end holds the stent in place. The stent cant be seen from outside the body. It shouldnt interfere with your normal routine. Your stent will be put in by a doctor trained in treating the urinary tract (a urologist) or another specialist. The procedure is done in a hospital or surgery center. Youll likely go home the same day.  When is a ureteral stent used?  A ureteral stent may be used:  · To bypass a blockage in a kidney or ureter.  · During kidney stone removal.  · To let a ureter heal after surgery.    Before the Procedure  Your healthcare provider will give you instructions to prepare for the procedure. X-rays or other imaging tests of your kidneys and ureters may be done beforehand.  During the procedure  · You receive medicine to prevent pain and help you relax or sleep during the procedure. Once this takes effect, the procedure starts.  · The doctor inserts a cystoscope (lighted instrument) through the urethra and into the bladder. This shows the opening to the ureter.  · A thin wire is carefully threaded through the cystoscope, up the ureter, and into the kidney. The stent is inserted over the wire.  · A fluoroscope (special X-ray machine) is used to help position the stent. When the stent is in place, the wire and cystoscope are removed.  While you have a stent  · Some discomfort is normal. Certain movements may trigger pain or a feeling that you need to urinate. You may also feel mild soreness or pressure before or during urination. These symptoms will go away a few days after the stent is removed.  · Medicine to control pain or bladder spasms or to prevent infection may be prescribed. Take this as directed.  · Drink plenty of fluids to help flush out your urinary  tract.  · Your urine may be slightly pink or red. This is due to bleeding caused by minor irritation from the stent. This may happen on and off while you have the stent.  · As with any synthetic device placed in the body, there is a risk of infection. The stent may have to be removed if this happens.   How long will you need a stent?  The stent is often taken out after the blockage in the ureter is treated or the ureter has healed. This may take 1 week to 2 weeks, or longer. If a stent is needed for a long time, it may need to be changed every few months.  When to call your healthcare provider  Contact your healthcare provider right away if:  · Your urine contains blood clots or you see a large amount of blood-tinged urine  · You have symptoms similar to those you had before the stent was placed  · You constantly leak urine  · You have a fever over 100.4°F (38°C), chills, nausea, or vomiting  · Your pain is not relieved with medicine  · The end of the stent comes out of the urethra   Date Last Reviewed: 1/1/2017  © 6045-9421 [employer]. [address] [place] [address]. All rights reserved. This information is not intended as a substitute for professional medical care. Always follow your healthcare professional's instructions.

## 2019-04-16 NOTE — ANESTHESIA POSTPROCEDURE EVALUATION
Anesthesia Post Evaluation    Patient: Eirc Brown    Procedure(s) Performed: Procedure(s) (LRB):  REPLACEMENT, STENT (Right)  CYSTOSCOPY (Right)  PYELOGRAM, RETROGRADE (Right)  URETEROSCOPY (Right)    Final Anesthesia Type: general  Patient location during evaluation: PACU  Patient participation: Yes- Able to Participate  Level of consciousness: awake and alert  Post-procedure vital signs: reviewed and stable  Pain management: adequate  Airway patency: patent  PONV status at discharge: No PONV  Anesthetic complications: no      Cardiovascular status: hemodynamically stable  Respiratory status: room air, spontaneous ventilation and unassisted  Hydration status: euvolemic  Follow-up not needed.          Vitals Value Taken Time   /66 4/16/2019  8:47 AM   Temp 36.4 °C (97.6 °F) 4/16/2019  8:45 AM   Pulse 51 4/16/2019  8:49 AM   Resp 16 4/16/2019  8:45 AM   SpO2 100 % 4/16/2019  8:49 AM   Vitals shown include unvalidated device data.      No case tracking events are documented in the log.      Pain/Michael Score: Michael Score: 10 (4/16/2019  9:01 AM)

## 2019-04-25 ENCOUNTER — TELEPHONE (OUTPATIENT)
Dept: OPHTHALMOLOGY | Facility: CLINIC | Age: 69
End: 2019-04-25

## 2019-04-25 ENCOUNTER — PATIENT MESSAGE (OUTPATIENT)
Dept: HEMATOLOGY/ONCOLOGY | Facility: CLINIC | Age: 69
End: 2019-04-25

## 2019-04-25 DIAGNOSIS — H25.12 NUCLEAR SCLEROTIC CATARACT OF LEFT EYE: Primary | ICD-10-CM

## 2019-04-25 NOTE — TELEPHONE ENCOUNTER
Yes  We agreed to do labs with him while he is on clinical trial at [facility]

## 2019-05-01 ENCOUNTER — OFFICE VISIT (OUTPATIENT)
Dept: OPHTHALMOLOGY | Facility: CLINIC | Age: 69
End: 2019-05-01
Payer: MEDICARE

## 2019-05-01 DIAGNOSIS — H35.372 EPIRETINAL MEMBRANE (ERM) OF LEFT EYE: ICD-10-CM

## 2019-05-01 DIAGNOSIS — H25.13 NUCLEAR SCLEROSIS, BILATERAL: Primary | ICD-10-CM

## 2019-05-01 PROCEDURE — 92014 PR EYE EXAM, EST PATIENT,COMPREHESV: ICD-10-PCS | Mod: S$GLB,,, | Performed by: OPHTHALMOLOGY

## 2019-05-01 PROCEDURE — 99999 PR PBB SHADOW E&M-EST. PATIENT-LVL II: ICD-10-PCS | Mod: PBBFAC,,, | Performed by: OPHTHALMOLOGY

## 2019-05-01 PROCEDURE — 99999 PR PBB SHADOW E&M-EST. PATIENT-LVL II: CPT | Mod: PBBFAC,,, | Performed by: OPHTHALMOLOGY

## 2019-05-01 PROCEDURE — 92014 COMPRE OPH EXAM EST PT 1/>: CPT | Mod: S$GLB,,, | Performed by: OPHTHALMOLOGY

## 2019-05-01 RX ORDER — MOXIFLOXACIN 5 MG/ML
1 SOLUTION/ DROPS OPHTHALMIC
Status: CANCELLED | OUTPATIENT
Start: 2019-05-01

## 2019-05-01 RX ORDER — TETRACAINE HYDROCHLORIDE 5 MG/ML
1 SOLUTION OPHTHALMIC
Status: CANCELLED | OUTPATIENT
Start: 2019-05-01

## 2019-05-01 RX ORDER — BENZONATATE 200 MG/1
CAPSULE ORAL
COMMUNITY
Start: 2019-04-28 | End: 2019-06-12

## 2019-05-01 RX ORDER — TROPICAMIDE 10 MG/ML
1 SOLUTION/ DROPS OPHTHALMIC
Status: CANCELLED | OUTPATIENT
Start: 2019-05-01

## 2019-05-01 RX ORDER — PHENYLEPHRINE HYDROCHLORIDE 25 MG/ML
1 SOLUTION/ DROPS OPHTHALMIC
Status: CANCELLED | OUTPATIENT
Start: 2019-05-01

## 2019-05-01 RX ORDER — PROMETHAZINE HYDROCHLORIDE 25 MG/1
25 TABLET ORAL EVERY 6 HOURS PRN
Refills: 7 | COMMUNITY
Start: 2019-04-10

## 2019-05-01 NOTE — PROGRESS NOTES
HPI     Patient states VA is worse and more cloudy. No other complaints.    Cataracts ou        Last edited by Ana Motley on 5/1/2019  1:22 PM. (History)            Assessment /Plan     For exam results, see Encounter Report.    Nuclear sclerosis, bilateral    Epiretinal membrane (ERM) of left eye        Visually Significant Cataract: Patient reports decreased vision consistent with the clinical amount of lenticular opacity, which reaches the level of visual significance and affects activities of daily living. Risks, benefits, and alternatives to cataract surgery were discussed and the consent reviewed. IOL options were discussed, including ATIOLs and the associated side effects and additional patient cost associated with them.   IOL Selections:   Right eye  IOL: ZXROO 12.5     Left eye  IOL: QPE613 12.0 at 85 (ERM, discussed limitations and retina eval)    Pt wishes to have right eye done first.  The patient expresses a desire to reduce spectacle dependence. I reviewed various IOL and LASER refractive surgical options and we will attempt to minimize spectacle dependence by managing astigmatism and optimizing IOL selection. Femtosecond LASER assisted cataract surgery (FLACS) technology was explained to the patient with educational videos and discussion.  The patient voices understanding and wishes to implement this technology during the cataract procedure.  I explained the increased precision of the LASER versus manual techniques, especially as it relates to astigmatism reduction with arcuate incisions.  I emphasized that although our goal is to reduce the need for refractive correction after surgery, there may still be a need for spectacle correction to achieve optimal visual acuity, and that a reasonable range of functional vision should be the expectation.  No guarantees are made about post operative refraction or visual acuity, as the eye may heal in unpredictable ways, and the standard risks, benefits, and  alternatives to cataract surgery were explained.  The patient understands that the refractive portions of this cataract procedure are not covered by insurance, and that there is an out of pocket expense of $2250 per eye. I also explained that even though our pre-operative plan is to utilize advanced refractive technologies during surgery, that I may decide to eliminate part or all of this plan if surgical challenges or complications arise, or I feel that it is not in the patient's best interest. Consent forms and an ABN form were given to the patient to review.      Catalys Parameters:  Right Eye:   SERGO:  12mm   ?Need to marc patient sitting up?: Yes  Capsulotomy: Scanned Capsule   rdGrdrrdarddrderd:rd rd3rd Arcuate:  OFF  Incisions: OFF  Left Eye:   SERGO:  12mm   ?Need to marc patient sitting up?: Yes  Capsulotomy: Scanned Capsule  rdGrdrrdarddrderd:rd rd3rd Arcuate: Toric Marc: at  Axis: 84   Incisions:  OFF

## 2019-05-06 ENCOUNTER — OFFICE VISIT (OUTPATIENT)
Dept: DERMATOLOGY | Facility: CLINIC | Age: 69
End: 2019-05-06
Payer: MEDICARE

## 2019-05-06 ENCOUNTER — LAB VISIT (OUTPATIENT)
Dept: LAB | Facility: HOSPITAL | Age: 69
End: 2019-05-06
Attending: INTERNAL MEDICINE
Payer: MEDICARE

## 2019-05-06 ENCOUNTER — OFFICE VISIT (OUTPATIENT)
Dept: HEMATOLOGY/ONCOLOGY | Facility: CLINIC | Age: 69
End: 2019-05-06
Payer: MEDICARE

## 2019-05-06 VITALS
HEIGHT: 69 IN | DIASTOLIC BLOOD PRESSURE: 85 MMHG | TEMPERATURE: 98 F | SYSTOLIC BLOOD PRESSURE: 171 MMHG | RESPIRATION RATE: 16 BRPM | HEART RATE: 74 BPM | WEIGHT: 190.06 LBS | BODY MASS INDEX: 28.15 KG/M2 | OXYGEN SATURATION: 99 %

## 2019-05-06 DIAGNOSIS — C78.00 MALIGNANT NEOPLASM METASTATIC TO LUNG, UNSPECIFIED LATERALITY: ICD-10-CM

## 2019-05-06 DIAGNOSIS — C22.1 INTRAHEPATIC CHOLANGIOCARCINOMA: ICD-10-CM

## 2019-05-06 DIAGNOSIS — L98.9 DISEASE OF SKIN AND SUBCUTANEOUS TISSUE: Primary | ICD-10-CM

## 2019-05-06 DIAGNOSIS — C22.1 INTRAHEPATIC CHOLANGIOCARCINOMA: Primary | ICD-10-CM

## 2019-05-06 LAB
ALBUMIN SERPL BCP-MCNC: 3.3 G/DL (ref 3.5–5.2)
ALP SERPL-CCNC: 104 U/L (ref 55–135)
ALT SERPL W/O P-5'-P-CCNC: 29 U/L (ref 10–44)
ANION GAP SERPL CALC-SCNC: 10 MMOL/L (ref 8–16)
AST SERPL-CCNC: 25 U/L (ref 10–40)
BILIRUB SERPL-MCNC: 0.4 MG/DL (ref 0.1–1)
BUN SERPL-MCNC: 21 MG/DL (ref 8–23)
CALCIUM SERPL-MCNC: 9.6 MG/DL (ref 8.7–10.5)
CHLORIDE SERPL-SCNC: 101 MMOL/L (ref 95–110)
CO2 SERPL-SCNC: 26 MMOL/L (ref 23–29)
CREAT SERPL-MCNC: 1.6 MG/DL (ref 0.5–1.4)
ERYTHROCYTE [DISTWIDTH] IN BLOOD BY AUTOMATED COUNT: 16.6 % (ref 11.5–14.5)
EST. GFR  (AFRICAN AMERICAN): 50.1 ML/MIN/1.73 M^2
EST. GFR  (NON AFRICAN AMERICAN): 43.3 ML/MIN/1.73 M^2
GLUCOSE SERPL-MCNC: 108 MG/DL (ref 70–110)
HCT VFR BLD AUTO: 31.9 % (ref 40–54)
HGB BLD-MCNC: 10.4 G/DL (ref 14–18)
IMM GRANULOCYTES # BLD AUTO: 0.1 K/UL (ref 0–0.04)
MCH RBC QN AUTO: 31.8 PG (ref 27–31)
MCHC RBC AUTO-ENTMCNC: 32.6 G/DL (ref 32–36)
MCV RBC AUTO: 98 FL (ref 82–98)
NEUTROPHILS # BLD AUTO: 5.1 K/UL (ref 1.8–7.7)
PLATELET # BLD AUTO: 242 K/UL (ref 150–350)
PMV BLD AUTO: 8.9 FL (ref 9.2–12.9)
POTASSIUM SERPL-SCNC: 5 MMOL/L (ref 3.5–5.1)
PROT SERPL-MCNC: 7.6 G/DL (ref 6–8.4)
RBC # BLD AUTO: 3.27 M/UL (ref 4.6–6.2)
SODIUM SERPL-SCNC: 137 MMOL/L (ref 136–145)
WBC # BLD AUTO: 7.16 K/UL (ref 3.9–12.7)

## 2019-05-06 PROCEDURE — 99202 OFFICE O/P NEW SF 15 MIN: CPT | Mod: 25,S$GLB,, | Performed by: DERMATOLOGY

## 2019-05-06 PROCEDURE — 36415 COLL VENOUS BLD VENIPUNCTURE: CPT

## 2019-05-06 PROCEDURE — 99214 OFFICE O/P EST MOD 30 MIN: CPT | Mod: S$GLB,,, | Performed by: INTERNAL MEDICINE

## 2019-05-06 PROCEDURE — 88305 TISSUE SPECIMEN TO PATHOLOGY, DERMATOLOGY: ICD-10-PCS | Mod: 26,,, | Performed by: PATHOLOGY

## 2019-05-06 PROCEDURE — 99999 PR PBB SHADOW E&M-EST. PATIENT-LVL III: CPT | Mod: PBBFAC,,, | Performed by: DERMATOLOGY

## 2019-05-06 PROCEDURE — 88312 SPECIAL STAINS GROUP 1: CPT | Mod: 26,,, | Performed by: PATHOLOGY

## 2019-05-06 PROCEDURE — 99202 PR OFFICE/OUTPT VISIT, NEW, LEVL II, 15-29 MIN: ICD-10-PCS | Mod: 25,S$GLB,, | Performed by: DERMATOLOGY

## 2019-05-06 PROCEDURE — 88312 TISSUE SPECIMEN TO PATHOLOGY, DERMATOLOGY: ICD-10-PCS | Mod: 26,,, | Performed by: PATHOLOGY

## 2019-05-06 PROCEDURE — 99999 PR PBB SHADOW E&M-EST. PATIENT-LVL IV: CPT | Mod: PBBFAC,,, | Performed by: INTERNAL MEDICINE

## 2019-05-06 PROCEDURE — 99999 PR PBB SHADOW E&M-EST. PATIENT-LVL IV: ICD-10-PCS | Mod: PBBFAC,,, | Performed by: INTERNAL MEDICINE

## 2019-05-06 PROCEDURE — 88305 TISSUE EXAM BY PATHOLOGIST: CPT | Performed by: PATHOLOGY

## 2019-05-06 PROCEDURE — 11104 PR PUNCH BIOPSY, SKIN, SINGLE LESION: ICD-10-PCS | Mod: S$GLB,,, | Performed by: DERMATOLOGY

## 2019-05-06 PROCEDURE — 99999 PR PBB SHADOW E&M-EST. PATIENT-LVL III: ICD-10-PCS | Mod: PBBFAC,,, | Performed by: DERMATOLOGY

## 2019-05-06 PROCEDURE — 85027 COMPLETE CBC AUTOMATED: CPT

## 2019-05-06 PROCEDURE — 99214 PR OFFICE/OUTPT VISIT, EST, LEVL IV, 30-39 MIN: ICD-10-PCS | Mod: S$GLB,,, | Performed by: INTERNAL MEDICINE

## 2019-05-06 PROCEDURE — 80053 COMPREHEN METABOLIC PANEL: CPT

## 2019-05-06 PROCEDURE — 11104 PUNCH BX SKIN SINGLE LESION: CPT | Mod: S$GLB,,, | Performed by: DERMATOLOGY

## 2019-05-06 RX ORDER — TRIAMCINOLONE ACETONIDE 1 MG/G
CREAM TOPICAL
Qty: 454 G | Refills: 3 | Status: SHIPPED | OUTPATIENT
Start: 2019-05-06 | End: 2019-06-12

## 2019-05-06 NOTE — PROGRESS NOTES
Subjective:       Patient ID:  Eric Brown is a 68 y.o. male who presents for   Chief Complaint   Patient presents with    Rash     Thighs, x 2 weeks, acne gel and hydrocortisone for treatment, burning and spreading      History of Present Illness: The patient presents with chief complaint of rash on his medial thighs and buttocks. Patient notes that he used to get it in the past. Told it was acne by derm in [place].   Duration: 10 days, last episode about 2-3 years ago, resolves with hydrocortisone  Signs/Symptoms: burning but also itching.     Prior treatments: hydrocortisone and benzoyl peroxide     Started chemo at [facility] for cholangiocarcinoma x 2 months      Review of Systems   Skin: Positive for itching (4/10) and rash.   Hematologic/Lymphatic: Does not bruise/bleed easily.        Objective:    Physical Exam   Constitutional: He appears well-developed and well-nourished. No distress.   Neurological: He is alert and oriented to person, place, and time. He is not disoriented.   Psychiatric: He has a normal mood and affect.   Skin:   Areas Examined (abnormalities noted in diagram):   Chest / Axilla Inspection Performed  Abdomen Inspection Performed  Genitals / Buttocks / Groin Inspection Performed  RLE Inspected  LLE Inspection Performed           Green: Well defined indurated erythematous plaques with thickened scale          Diagram Legend     Erythematous scaling macule/papule c/w actinic keratosis       Vascular papule c/w angioma      Pigmented verrucoid papule/plaque c/w seborrheic keratosis      Yellow umbilicated papule c/w sebaceous hyperplasia      Irregularly shaped tan macule c/w lentigo     1-2 mm smooth white papules consistent with Milia      Movable subcutaneous cyst with punctum c/w epidermal inclusion cyst      Subcutaneous movable cyst c/w pilar cyst      Firm pink to brown papule c/w dermatofibroma      Pedunculated fleshy papule(s) c/w skin tag(s)      Evenly pigmented  macule c/w junctional nevus     Mildly variegated pigmented, slightly irregular-bordered macule c/w mildly atypical nevus      Flesh colored to evenly pigmented papule c/w intradermal nevus       Pink pearly papule/plaque c/w basal cell carcinoma      Erythematous hyperkeratotic cursted plaque c/w SCC      Surgical scar with no sign of skin cancer recurrence      Open and closed comedones      Inflammatory papules and pustules      Verrucoid papule consistent consistent with wart     Erythematous eczematous patches and plaques     Dystrophic onycholytic nail with subungual debris c/w onychomycosis     Umbilicated papule    Erythematous-base heme-crusted tan verrucoid plaque consistent with inflamed seborrheic keratosis     Erythematous Silvery Scaling Plaque c/w Psoriasis     See annotation                  Assessment / Plan:      Pathology Orders:     Normal Orders This Visit    Tissue Specimen To Pathology, Dermatology     Questions:    Directional Terms:  Other(comment)    Clinical Information:  r/o drug induced (on [facility]) psoriasiform eruption    Specific Site:  left ant thigh        Disease of skin and subcutaneous tissue  -     Tissue Specimen To Pathology, Dermatology    Punch biopsy procedure note:  Punch biopsy performed after verbal consent obtained. Area marked and prepped with alcohol. Approximately 1cc of 1% lidocaine with epinephrine injected. 4 mm disposable punch used to remove lesion. Hemostasis obtained and biopsy site closed with 1 - 2 Prolene sutures. Wound care instructions reviewed with patient and handout given.    -     triamcinolone acetonide 0.1% (KENALOG) 0.1 % cream; AAA bid  Dispense: 454 g; Refill: 3             No follow-ups on file.

## 2019-05-06 NOTE — PATIENT INSTRUCTIONS
"Punch Biopsy Wound Care    Your doctor has performed a punch biopsy today.  A band aid and antibiotic ointment has been placed over the site.  This should remain in place for 24 hours.  It is recommended that you keep the area dry for the first 24 hours.  After 24 hours, you may remove the band aid and wash the area with warm soap and water and apply Vaseline jelly.  Many patients prefer to use Neosporin or Bacitracin ointment.  This is acceptable; however know that you can develop an allergy to this medication even if you have used it safely for years.  It is important to keep the area moist.  Letting it dry out and get air slows healing time, will worsen the scar, and make it more difficult to remove the stitches if they were placed.  Band aid is optional after first 24 hours.      If you notice increasing redness, tenderness, pain, or yellow drainage at the biopsy or surgical site, please notify your doctor.  These are signs of an infection.    If your biopsy/surgical site is bleeding, apply firm pressure for 15 minutes straight.  Repeat for another 15 minutes, if it is still bleeding.   If the surgical site continues to bleed, then please contact your doctor.      For [facility] users:   You will receive a [facility] notification after the pathologist has finished reviewing your biopsy specimen. Pathology results, however, will not be released online so you will see a "no content" message. Once your dermatologist reviews and clinically correlates your biopsy results, you will either receive a letter in the mail with the results of a phone call from your doctor's office if further explanation or treatment is warranted.       [address] [place] [address]/ [phone] FAX/ [email or web address]         "

## 2019-05-06 NOTE — PROGRESS NOTES
"Subjective:       Patient ID: Eric Brown is a 68 y.o. male.    Chief Complaint: Intrahepatic cholangiocarcinoma  ONCOLOGIC HISTORY:  Mr. Brown is a 67 year old male who in 6/2012 underwent a distal pancreatectomy for a PNET and a liver resection for a T2N0M0 intrahepatic cholangiocarcinoma.  Unfortunately had a recurrence of the cholangiocarcinoma and underwent a repeat resection at [facility].  Completed adjuvant treatment with cisplatin and gemcitabine - 6 cycles.     He has been on surveillance.     He underwent PET scan on 1/3/18 at [facility] which reveals "Scattered subcentimeter pulmonary nodules, some new/larger than on 07/31/2017, nonspecific, possibly metastatic or inflammatory, to be followed.  Small metabolically active foci in the liver, at sites of previously noted subcentimeter hypervascular nodules on liver protocol CT dated 10/02/2017, concerning for metastases Metabolically active periportal and retroperitoneal adenopathy, larger than on 10/02/2017, consistent with metastatic disease. Spiculated soft tissue in the pancreatectomy bed region, with low-grade metabolic activity, most likely related to postsurgical change, but local recurrence cannot be entirely excluded, to be followed"  He underwent biopsy of soft tissue of peritoneum and pathology revealed metastatic cancer compatible with mets from cholangiocarcinoma. He has been recommended to start chemo with Cisplatin and gemzar and they are also evaluating him for trials.           He completed 3 cycles of Cisplatin and Gemzar until 3/18 and progressed  He then went to [facility] and started Protocol with  4/2018 - 12/2018   He recently underwent MRI abdomen at [facility] on 1/28/19 which reveals "Stable to slightly increased size of metastatic disease in the liver remnant (2 lesions). Other arterial foci are nonspecific and may represent shunt phenomena. Focal enhancement in the pancreatic resection bed is stable compared to " "11/07/2018 and nonspecific. Surgical bed fluid has resolved. Retroperitoneal lymphadenopathy has moderately increased"  CT chest on 1/29/19 reveals Compared to 11/07/2018, innumerable metastatic lung nodules have increased in size.  He is now on a PARP inhibitor trial Niraparib since 3/1/19 at [facility]       HPIHe is still on PARP inhibitor trial through [facility] and notes nausea and abdomen cramps. He is regular bowel movements.        Review of Systems   Constitutional: Negative for appetite change, fatigue and unexpected weight change.   HENT: Negative for mouth sores.    Eyes: Negative for visual disturbance.   Respiratory: Negative for cough and shortness of breath.    Cardiovascular: Negative for chest pain.   Gastrointestinal: Negative for abdominal pain and diarrhea.   Genitourinary: Negative for frequency.   Musculoskeletal: Negative for back pain.   Skin: Negative for rash.   Neurological: Negative for headaches.   Hematological: Negative for adenopathy.   Psychiatric/Behavioral: The patient is not nervous/anxious.    All other systems reviewed and are negative.      PMFSH: all information reviewed and updated as relevant to today's visit  Objective:      Physical Exam   Constitutional: He is oriented to person, place, and time. He appears well-developed and well-nourished.   HENT:   Mouth/Throat: No oropharyngeal exudate.   Cardiovascular: Normal rate and normal heart sounds.   Pulmonary/Chest: Effort normal and breath sounds normal. He has no wheezes.   Abdominal: Soft. Bowel sounds are normal. There is no tenderness.   Musculoskeletal: He exhibits no edema or tenderness.   Lymphadenopathy:     He has no cervical adenopathy.   Neurological: He is alert and oriented to person, place, and time. Coordination normal.   Skin: Skin is warm and dry. No rash noted.   Psychiatric: He has a normal mood and affect. Judgment and thought content normal.   Vitals reviewed.      LABS:  WBC   Date Value Ref Range " Status   05/06/2019 7.16 3.90 - 12.70 K/uL Final     Hemoglobin   Date Value Ref Range Status   05/06/2019 10.4 (L) 14.0 - 18.0 g/dL Final     Hematocrit   Date Value Ref Range Status   05/06/2019 31.9 (L) 40.0 - 54.0 % Final     Platelets   Date Value Ref Range Status   05/06/2019 242 150 - 350 K/uL Final     Gran # (ANC)   Date Value Ref Range Status   05/06/2019 5.1 1.8 - 7.7 K/uL Final     Comment:     The ANC is based on a white cell differential from an   automated cell counter. It has not been microscopically   reviewed for the presence of abnormal cells. Clinical   correlation is required.         Chemistry        Component Value Date/Time     05/06/2019 1522    K 5.0 05/06/2019 1522     05/06/2019 1522    CO2 26 05/06/2019 1522    BUN 21 05/06/2019 1522    CREATININE 1.6 (H) 05/06/2019 1522     05/06/2019 1522        Component Value Date/Time    CALCIUM 9.6 05/06/2019 1522    ALKPHOS 104 05/06/2019 1522    AST 25 05/06/2019 1522    ALT 29 05/06/2019 1522    BILITOT 0.4 05/06/2019 1522    ESTGFRAFRICA 50.1 (A) 05/06/2019 1522    EGFRNONAA 43.3 (A) 05/06/2019 1522          Assessment:       1. Intrahepatic cholangiocarcinoma    2. Malignant neoplasm metastatic to lung, unspecified laterality        Plan:        1,2, He will continue on PARP inhibitor with [facility]. He will have restaging scans in 3 weeks over there      Above care plan was discussed with patient and accompanying wife and all questions were addressed to their satisfaction

## 2019-05-09 ENCOUNTER — TELEPHONE (OUTPATIENT)
Dept: NEPHROLOGY | Facility: CLINIC | Age: 69
End: 2019-05-09

## 2019-05-20 ENCOUNTER — OFFICE VISIT (OUTPATIENT)
Dept: DERMATOLOGY | Facility: CLINIC | Age: 69
End: 2019-05-20
Payer: MEDICARE

## 2019-05-20 DIAGNOSIS — L27.0 ERUPTION DUE TO DRUG: Primary | ICD-10-CM

## 2019-05-20 PROCEDURE — 99212 PR OFFICE/OUTPT VISIT, EST, LEVL II, 10-19 MIN: ICD-10-PCS | Mod: S$GLB,,, | Performed by: DERMATOLOGY

## 2019-05-20 PROCEDURE — 99212 OFFICE O/P EST SF 10 MIN: CPT | Mod: S$GLB,,, | Performed by: DERMATOLOGY

## 2019-05-20 PROCEDURE — 99999 PR PBB SHADOW E&M-EST. PATIENT-LVL II: ICD-10-PCS | Mod: PBBFAC,,, | Performed by: DERMATOLOGY

## 2019-05-20 PROCEDURE — 99999 PR PBB SHADOW E&M-EST. PATIENT-LVL II: CPT | Mod: PBBFAC,,, | Performed by: DERMATOLOGY

## 2019-05-20 NOTE — PROGRESS NOTES
Subjective:       Patient ID:  Eric Brown is a 68 y.o. male who presents for   Chief Complaint   Patient presents with    Rash     follow up     Pt seen 5/6/19 for burning rash on medial thighs and buttocks that had been present x 10 days (started chemo with niraparib at [facility] for cholangiocarcinoma 2 months ago)  Treating with TAC cream bid - seems resolved    bx done:    FINAL PATHOLOGIC DIAGNOSIS  1. Skin, left anterior thigh, punch biopsy:  - CHANGES MOST COMPATIBLE WITH A DRUG ERUPTION.  MICROSCOPIC DESCRIPTION: The biopsy shows parakeratosis that is focally associated with neutrophils. The  epidermis exhibits focal hypogranulosis, mild spongiosis with exocytosis of lymphocytes, and focal subtle vacuolar  interface alteration with rare apoptotic keratinocytes. The underlying superficial dermis shows a perivascular  lymphohistiocytic infiltrate with scattered neutrophils and a few eosinophils. Prominent erythrocyte extravasation is  also noted within the superficial dermis. PAS stain is negative for fungi. Appropriately reactive controls were  reviewed.  COMMENT: These changes are most compatible with a drug eruption.      Had similar rash 2 - 3 years ago - resolved with OTC HC      Review of Systems   Skin: Negative for itching (resolved) and rash.   Hematologic/Lymphatic: Does not bruise/bleed easily.        Objective:    Physical Exam   Constitutional: He appears well-developed and well-nourished. No distress.   Neurological: He is alert and oriented to person, place, and time. He is not disoriented.   Psychiatric: He has a normal mood and affect.   Skin:   Areas Examined (abnormalities noted in diagram):   Chest / Axilla Inspection Performed  Genitals / Buttocks / Groin Inspection Performed  RLE Inspected  LLE Inspection Performed                  Diagram Legend     Erythematous scaling macule/papule c/w actinic keratosis       Vascular papule c/w angioma      Pigmented verrucoid  papule/plaque c/w seborrheic keratosis      Yellow umbilicated papule c/w sebaceous hyperplasia      Irregularly shaped tan macule c/w lentigo     1-2 mm smooth white papules consistent with Milia      Movable subcutaneous cyst with punctum c/w epidermal inclusion cyst      Subcutaneous movable cyst c/w pilar cyst      Firm pink to brown papule c/w dermatofibroma      Pedunculated fleshy papule(s) c/w skin tag(s)      Evenly pigmented macule c/w junctional nevus     Mildly variegated pigmented, slightly irregular-bordered macule c/w mildly atypical nevus      Flesh colored to evenly pigmented papule c/w intradermal nevus       Pink pearly papule/plaque c/w basal cell carcinoma      Erythematous hyperkeratotic cursted plaque c/w SCC      Surgical scar with no sign of skin cancer recurrence      Open and closed comedones      Inflammatory papules and pustules      Verrucoid papule consistent consistent with wart     Erythematous eczematous patches and plaques     Dystrophic onycholytic nail with subungual debris c/w onychomycosis     Umbilicated papule    Erythematous-base heme-crusted tan verrucoid plaque consistent with inflamed seborrheic keratosis     Erythematous Silvery Scaling Plaque c/w Psoriasis     See annotation      Assessment / Plan:        Eruption due to drug (niraparib) - now only PIPA  Rash is not life threatening and easily treated with TAC cream therefore ok to proceed with niraparib if necessary. Discussed with pt that restarting med may cause rash to recur and that he can restart TAC cream bid as needed.     D/c TAC             Follow up if symptoms worsen or fail to improve.

## 2019-05-22 ENCOUNTER — TELEPHONE (OUTPATIENT)
Dept: NEPHROLOGY | Facility: CLINIC | Age: 69
End: 2019-05-22

## 2019-05-24 ENCOUNTER — TELEPHONE (OUTPATIENT)
Dept: NEPHROLOGY | Facility: CLINIC | Age: 69
End: 2019-05-24

## 2019-05-24 ENCOUNTER — TELEPHONE (OUTPATIENT)
Dept: OPHTHALMOLOGY | Facility: CLINIC | Age: 69
End: 2019-05-24

## 2019-05-24 DIAGNOSIS — N13.30 HYDRONEPHROSIS, UNSPECIFIED HYDRONEPHROSIS TYPE: Primary | ICD-10-CM

## 2019-05-24 NOTE — TELEPHONE ENCOUNTER
I spoke with patient he decided to keep surgery as is for now.  Will call if he needs to reschedule.   
stated

## 2019-05-27 ENCOUNTER — PATIENT MESSAGE (OUTPATIENT)
Dept: HEMATOLOGY/ONCOLOGY | Facility: CLINIC | Age: 69
End: 2019-05-27

## 2019-05-27 ENCOUNTER — TELEPHONE (OUTPATIENT)
Dept: OPTOMETRY | Facility: CLINIC | Age: 69
End: 2019-05-27

## 2019-05-28 ENCOUNTER — INITIAL CONSULT (OUTPATIENT)
Dept: RADIATION ONCOLOGY | Facility: CLINIC | Age: 69
End: 2019-05-28
Payer: MEDICARE

## 2019-05-28 VITALS
BODY MASS INDEX: 29.06 KG/M2 | RESPIRATION RATE: 18 BRPM | OXYGEN SATURATION: 99 % | DIASTOLIC BLOOD PRESSURE: 70 MMHG | WEIGHT: 196.19 LBS | SYSTOLIC BLOOD PRESSURE: 146 MMHG | TEMPERATURE: 98 F | HEART RATE: 72 BPM | HEIGHT: 69 IN

## 2019-05-28 DIAGNOSIS — M25.552 LEFT HIP PAIN: ICD-10-CM

## 2019-05-28 DIAGNOSIS — C22.1 CHOLANGIOCARCINOMA: Primary | ICD-10-CM

## 2019-05-28 PROCEDURE — 99999 PR PBB SHADOW E&M-EST. PATIENT-LVL IV: CPT | Mod: PBBFAC,,, | Performed by: RADIOLOGY

## 2019-05-28 PROCEDURE — 99203 PR OFFICE/OUTPT VISIT, NEW, LEVL III, 30-44 MIN: ICD-10-PCS | Mod: S$GLB,,, | Performed by: RADIOLOGY

## 2019-05-28 PROCEDURE — 99999 PR PBB SHADOW E&M-EST. PATIENT-LVL IV: ICD-10-PCS | Mod: PBBFAC,,, | Performed by: RADIOLOGY

## 2019-05-28 PROCEDURE — 99203 OFFICE O/P NEW LOW 30 MIN: CPT | Mod: S$GLB,,, | Performed by: RADIOLOGY

## 2019-05-28 NOTE — PROGRESS NOTES
PATIENT: Eric Brown  MRN: [id number]  DATE: 5/28/2019      Diagnosis:   1. Cholangiocarcinoma    2. Left hip pain        Chief Complaint: Cholangiocarcinoma (Consult)    Subjective:   Mr. Brown is a 68 year old male with stage IV cholangiocarcinoma who presents to the Radiation Oncology Clinic for consultation.    Oncologic History  -6/2012 underwent a distal pancreatectomy for a PNET and a liver resection for a T2N0M0 intrahepatic cholangiocarcinoma.  Unfortunately had a recurrence of the cholangiocarcinoma and underwent a repeat resection at [facility].  Completed adjuvant treatment with cisplatin and gemcitabine - 6 cycles.  -Was then on surveillance   -PET scan on 1/3/18 showed new disease, biopsy of soft tissue of peritoneum and pathology revealed metastatic cancer compatible with mets from cholangiocarcinoma  -Completed 3 cycles of Cisplatin and Gemzar until 3/18 and progressed  -He then went to [facility] and started Protocol with  4/2018 - 12/2018. In Jan 2019, had progression of disease  -Placed on PARP inhibitor trial Niraparib since 3/1/19 at [facility], last 5/22/19, stopped due to progression of disease  -Followed by Dr. Canales locally  -Patient previously had XRT to eligio-aortic LN  -Per most recent imaging (MRI) at [facility]: 2.8 x 2.0 cm enhancing lesion at the superior aspect of the right acetabulum (series 8 image 31), increased in size. Another 0.8 cm enhancing lesion in the left ischium (series 8 image 36) was not seen previously. Another 1.5 cm lumbar metastasis image 362 series 16 has also increased in size.  -Planned to start phase 1 clinical trial 6/5/19 with dual PD1/LAG3 immunotherapy    Patient seen today with his wife and son. He endorses lower back pain and bilateral hip pain, which is intermittent in nature, worse at night. He was on Tramadol until 2 days ago when he was switched to Oxycodone PRN. He has been taking 1 tablet before bed, and one if he wakes up at night. Denies  any numbness/tingling, issues with urination or bowel movements. Occasional notes some right lower extremity weakness. Endorses fatigue, and some nausea while on Niraparib. Weight stable. ECOG 1. Walks 2 miles per day.     Past Medical History:   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    s/p resection, [facility]    Kidney stone     Prediabetes     Vitreous floaters        Past Surgical HIstory:   Past Surgical History:   Procedure Laterality Date    BIOPSY, BLADDER N/A 11/27/2018    Performed by Rodger [facility]oguera [facility] at [facility] OR 1ST FLR    CARDIAC SURGERY      CHOLECYSTECTOMY      5-2012    COLONOSCOPY N/A 11/24/2015    Performed by Emma Dhillon MD at [facility] ENDO (4TH FLR)    CYSTOSCOPY Right 4/16/2019    Performed by Rodger [facility]oguera MD at [facility] OR 1ST FLR    CYSTOSCOPY  11/27/2018    Performed by Rodger Noguera MD at [facility] OR 1ST FLR    EXTRACORPOREAL SHOCK WAVE LITHOTRIPSY  2007 and 2008    litho      LIVER RESECTION  5-2012    Distal pancreatectomy    LIVER RESECTION  1-22-15    Hepatic cholangiocarcinoma    MITRAL VALVE SURGERY      PLACEMENT-STENT Right 11/27/2018    Performed by [facility]era MD at [facility] OR 1ST FLR    PYELOGRAM, RETROGRADE Right 4/16/2019    Performed by Rodger Noguera MD at [facility] OR 1ST FLR    REMOVAL-PORT-A-CATH N/A 2/22/2016    Performed by [facility] at [facility] OR 2ND FLR    REPLACEMENT, STENT Right 4/16/2019    Performed by Rodger Noguera MD at [facility] OR 1ST FLR    URETEROSCOPY Right 4/16/2019    Performed by Rodger Noguera MD at [facility] OR 1ST FLR       Family History:   Family History   Problem Relation Age of Onset     Hypertension Mother     Cataracts Father     Amblyopia Neg Hx     Blindness Neg Hx     Glaucoma Neg Hx     Macular degeneration Neg Hx     Retinal detachment Neg Hx     Strabismus Neg Hx     Melanoma Neg Hx        Social History:  reports that he quit smoking about 34 years ago. He has never used smokeless tobacco. He reports that he does not drink alcohol or use drugs.    Allergies:  Review of patient's allergies indicates:   Allergen Reactions    Iodine and iodide containing products Rash     5/22/18 premedicated prenednsone 50 mg po X3 and benadryl 50 mg po tablet  Prior to  Ct scan.  7/3/18- As per pt last scan 6 weeks ago took 13 hr prep med prednisone 50 mg & benadryl 50 mg tablet & was tolerated well post scan    Iohexol Hives and Rash     Rash on L leg, resolves in 1 hour. Pt did well with 13hr prep of Prednisone 50mg POx3 doses and Benadryl 50mg PO during CT done back in 2/2017.        Medications:  Current Outpatient Medications   Medication Sig Dispense Refill    allopurinol (ZYLOPRIM) 100 MG tablet TAKE 1 TABLET (100 MG TOTAL) BY MOUTH ONCE DAILY. 90 tablet 3    atorvastatin (LIPITOR) 10 MG tablet TAKE 1 TABLET (10 MG TOTAL) BY MOUTH ONCE DAILY. 90 tablet 2    benzonatate (TESSALON) 200 MG capsule       cholecalciferol, vitamin D3, (VITAMIN D3) 1,000 unit capsule Take 5,000 mg by mouth.      felodipine (PLENDIL) 5 MG 24 hr tablet Take 1 tablet (5 mg total) by mouth 2 (two) times daily. 60 tablet 11    metoprolol succinate (TOPROL-XL) 100 MG 24 hr tablet Take 1 tablet (100 mg total) by mouth once daily. 60 tablet 6    MULTIVIT-IRON-MIN-FOLIC ACID 3,500-18-0.4 UNIT-MG-MG ORAL CHEW Take by mouth.      multivit-min-FA-lycopen-lutein 0.4-300-250 mg-mcg-mcg Tab Take by mouth.      omeprazole (PRILOSEC) 20 MG capsule Take 1 capsule (20 mg total) by mouth once daily. 90 capsule 3    ondansetron (ZOFRAN) 8 MG tablet Take 1 tablet (8 mg total) by mouth 4 (four) times daily as needed for  "Nausea. 60 tablet 2    promethazine (PHENERGAN) 25 MG tablet Take 25 mg by mouth every 6 (six) hours as needed.  7    tamsulosin (FLOMAX) 0.4 mg Cap Take 1 capsule (0.4 mg total) by mouth once daily. 60 capsule 6    traMADol (ULTRAM) 50 mg tablet Take 1 tablet (50 mg total) by mouth every 12 (twelve) hours as needed for Pain. 20 tablet 0    triamcinolone acetonide 0.1% (KENALOG) 0.1 % cream AAA bid 454 g 3     No current facility-administered medications for this visit.        Review of Systems   Constitutional: Positive for fatigue. Negative for chills, fever and unexpected weight change.   HENT: Negative for nosebleeds and sore throat.    Respiratory: Negative for cough and shortness of breath.    Cardiovascular: Negative for chest pain and leg swelling.   Gastrointestinal: Negative for abdominal pain, blood in stool, nausea and vomiting.   Genitourinary: Negative for dysuria and hematuria.   Musculoskeletal: Positive for arthralgias and back pain. Negative for gait problem, joint swelling and myalgias.   Skin: Negative for rash.   Neurological: Negative for light-headedness, numbness and headaches.   Hematological: Negative for adenopathy. Does not bruise/bleed easily.       ECOG Performance Status: 1   Objective:      Vitals:   Vitals:    05/28/19 1357   BP: (!) 146/70   BP Location: Right arm   Patient Position: Sitting   Pulse: 72   Resp: 18   Temp: 97.9 °F (36.6 °C)   TempSrc: Oral   SpO2: 99%   Weight: 89 kg (196 lb 3.2 oz)   Height: 5' 9" (1.753 m)     BMI: Body mass index is 28.97 kg/m².    Physical Exam   Constitutional: He appears well-developed and well-nourished. No distress.   HENT:   Head: Normocephalic and atraumatic.   Eyes: Pupils are equal, round, and reactive to light. EOM are normal. No scleral icterus.   Neck: Neck supple.   Cardiovascular: Normal rate and regular rhythm.   Pulmonary/Chest: Effort normal and breath sounds normal. No respiratory distress.   Abdominal: Soft. He exhibits no " distension. There is no tenderness.   Musculoskeletal: Normal range of motion. He exhibits no edema or tenderness.   No spinal tenderness, no paraspinal tenderness  Lower extremity strength 5/5 bilaterally  Gait intact   Lymphadenopathy:     He has no cervical adenopathy.   Neurological: He is alert. No sensory deficit. Coordination normal.   Skin: Skin is warm and dry. He is not diaphoretic.   Psychiatric: He has a normal mood and affect. His behavior is normal.       Laboratory Data:  No visits with results within 1 Week(s) from this visit.   Latest known visit with results is:   Lab Visit on 05/06/2019   Component Date Value Ref Range Status    WBC 05/06/2019 7.16  3.90 - 12.70 K/uL Final    RBC 05/06/2019 3.27* 4.60 - 6.20 M/uL Final    Hemoglobin 05/06/2019 10.4* 14.0 - 18.0 g/dL Final    Hematocrit 05/06/2019 31.9* 40.0 - 54.0 % Final    Mean Corpuscular Volume 05/06/2019 98  82 - 98 fL Final    Mean Corpuscular Hemoglobin 05/06/2019 31.8* 27.0 - 31.0 pg Final    Mean Corpuscular Hemoglobin Conc 05/06/2019 32.6  32.0 - 36.0 g/dL Final    RDW 05/06/2019 16.6* 11.5 - 14.5 % Final    Platelets 05/06/2019 242  150 - 350 K/uL Final    MPV 05/06/2019 8.9* 9.2 - 12.9 fL Final    Gran # (ANC) 05/06/2019 5.1  1.8 - 7.7 K/uL Final    Comment: The ANC is based on a white cell differential from an   automated cell counter. It has not been microscopically   reviewed for the presence of abnormal cells. Clinical   correlation is required.      Immature Grans (Abs) 05/06/2019 0.10* 0.00 - 0.04 K/uL Final    Comment: Mild elevation in immature granulocytes is non specific and   can be seen in a variety of conditions including stress response,   acute inflammation, trauma and pregnancy. Correlation with other   laboratory and clinical findings is essential.      Sodium 05/06/2019 137  136 - 145 mmol/L Final    Potassium 05/06/2019 5.0  3.5 - 5.1 mmol/L Final    Chloride 05/06/2019 101  95 - 110 mmol/L Final     CO2 05/06/2019 26  23 - 29 mmol/L Final    Glucose 05/06/2019 108  70 - 110 mg/dL Final    BUN, Bld 05/06/2019 21  8 - 23 mg/dL Final    Creatinine 05/06/2019 1.6* 0.5 - 1.4 mg/dL Final    Calcium 05/06/2019 9.6  8.7 - 10.5 mg/dL Final    Total Protein 05/06/2019 7.6  6.0 - 8.4 g/dL Final    Albumin 05/06/2019 3.3* 3.5 - 5.2 g/dL Final    Total Bilirubin 05/06/2019 0.4  0.1 - 1.0 mg/dL Final    Comment: For infants and newborns, interpretation of results should be based  on gestational age, weight and in agreement with clinical  observations.  Premature Infant recommended reference ranges:  Up to 24 hours.............<8.0 mg/dL  Up to 48 hours............<12.0 mg/dL  3-5 days..................<15.0 mg/dL  6-29 days.................<15.0 mg/dL      Alkaline Phosphatase 05/06/2019 104  55 - 135 U/L Final    AST 05/06/2019 25  10 - 40 U/L Final    ALT 05/06/2019 29  10 - 44 U/L Final    Anion Gap 05/06/2019 10  8 - 16 mmol/L Final    eGFR if  05/06/2019 50.1* >60 mL/min/1.73 m^2 Final    eGFR if non African American 05/06/2019 43.3* >60 mL/min/1.73 m^2 Final    Comment: Calculation used to obtain the estimated glomerular filtration  rate (eGFR) is the CKD-EPI equation.            Imaging:   Result Narrative   FULL RESULT:    Examination: MRI ABDOMEN & PELVIS W AND WO CONTRAST on 5/21/2019 7:11 PM.    Clinical History: Intrahepatic cholangiocarcinoma, pancreatic neuroendocrine tumor, status post bisegmentectomy of the liver, cholecystectomy and distal pancreatectomy on 05/28/2012, partial right hepatectomy with resection of the segment 7 lesion and diaphragmatic resection on 01/22/2015, suspicious pulmonary nodules and on chemotherapy.    Indication: Posttreatment reassessment.    Comparison: MRI of the abdomen and pelvis dated 1/24/2019.    Technique: Multiplanar multisequence MRI of the and pelvis was performed with and without intravenous contrast.     Findings:      There are  postsurgical changes related to prior hepatic bisegmentectomy. There has been mild interval increase in the 2.0 x 1.9 cm segment VII metastasis (series 17 image 32), previously measuring 1.4 x 1.2 cm on MRI 30/7/2019. A 1.0 cm segment V (series 6 image 322) metastasis is noted and not significantly changed.    There are few subcentimeter enhancing foci in the liver (for example on series 1600 images 176 and 202) and not significantly changed from MRI 3/7/2019 and remains indeterminate.    The gallbladder has been resected. There is no biliary dilatation.     There is stable persistent right hydroureteronephrosis likely secondary to the retroperitoneal adenopathy. There are subcentimeter sized simple and hemorrhagic cysts in bilateral kidneys.    The pancreatic and adrenal glands are unremarkable. No focal pancreatic mass is seen.    The spleen and adrenals are unremarkable.    The visualized bowel is nonobstructed. There is minimal free fluid in the pelvis.    The urinary bladder is unremarkable.    There is a 2.8 x 2.0 cm enhancing lesion at the superior aspect of the right acetabulum (series 8 image 31), increased in size. Another 0.8 cm enhancing lesion in the left ischium (series 8 image 36) was not seen previously. Another 1.5 cm lumbar metastasis image 362 series 16 has also increased in size.    There are multiple pulmonary metastases that have increased in size.    Other Result Information   Interface, Radiology Results In - 05/22/2019 10:25 AM CDT  FULL RESULT:    Examination: MRI ABDOMEN & PELVIS W AND WO CONTRAST on 5/21/2019 7:11 PM.    Clinical History: Intrahepatic cholangiocarcinoma, pancreatic neuroendocrine tumor, status post bisegmentectomy of the liver, cholecystectomy and distal pancreatectomy on 05/28/2012, partial right hepatectomy with resection of the segment 7 lesion and diaphragmatic resection on 01/22/2015, suspicious pulmonary nodules and on chemotherapy.    Indication: Posttreatment  reassessment.    Comparison: MRI of the abdomen and pelvis dated 1/24/2019.    Technique: Multiplanar multisequence MRI of the and pelvis was performed with and without intravenous contrast.     Findings:      There are postsurgical changes related to prior hepatic bisegmentectomy. There has been mild interval increase in the 2.0 x 1.9 cm segment VII metastasis (series 17 image 32), previously measuring 1.4 x 1.2 cm on MRI 30/7/2019. A 1.0 cm segment V (series 6 image 322) metastasis is noted and not significantly changed.    There are few subcentimeter enhancing foci in the liver (for example on series 1600 images 176 and 202) and not significantly changed from MRI 3/7/2019 and remains indeterminate.    The gallbladder has been resected. There is no biliary dilatation.     There is stable persistent right hydroureteronephrosis likely secondary to the retroperitoneal adenopathy. There are subcentimeter sized simple and hemorrhagic cysts in bilateral kidneys.    The pancreatic and adrenal glands are unremarkable. No focal pancreatic mass is seen.    The spleen and adrenals are unremarkable.    The visualized bowel is nonobstructed. There is minimal free fluid in the pelvis.    The urinary bladder is unremarkable.    There is a 2.8 x 2.0 cm enhancing lesion at the superior aspect of the right acetabulum (series 8 image 31), increased in size. Another 0.8 cm enhancing lesion in the left ischium (series 8 image 36) was not seen previously. Another 1.5 cm lumbar metastasis image 362 series 16 has also increased in size.    There are multiple pulmonary metastases that have increased in size.     IMPRESSION:  1.  Stable liver metastases.  2.  Stable metastatic retroperitoneal lymphadenopathy obstructing the right kidney.  3.  New and enlarging bone metastases.   4.  Increase in lung metastases.       Assessment:       1. Cholangiocarcinoma    2. Left hip pain           Plan:   Stage IV cholangiocarcinoma, metastatic to  liver, lung, bone  -Placed on PARP inhibitor trial Niraparib since 3/1/19 at [facility], last received 5/22/19, stopped due to progression of disease  -Planned to start phase 1 clinical trial 6/5/19 with dual PD1/LAG3 immunotherapy    Lower back pain and bilateral hip pain  -Per most recent imaging (MRI) at [facility]: 2.8 x 2.0 cm enhancing lesion at the superior aspect of the right acetabulum (series 8 image 31), increased in size. Another 0.8 cm enhancing lesion in the left ischium (series 8 image 36) was not seen previously. Another 1.5 cm lumbar metastasis image 362 series 16 has also increased in size.  -We personally reviewed the images  -Unclear if patient's pain corresponds to bony lesions identified on imaging.  -Patient on oxycodone PRN for pain  -Discussed that could consider palliative RT to right hip. After discussion of risks/benefits, patient and family would ultimately like to hold off radiation at this time in order to not delay enrollment in planned clinical trial 6/5/19, and re-evaluate if the pain gets worse    The following was staffed and discussed with supervising physician Dr. Matta.    Marylu Spears MD PGY-IV  Hematology/Oncology Fellow

## 2019-05-28 NOTE — LETTER
May 28, 2019      Amalia Canales MD  [address]  [place] [address]           [facility]  [address]  [facility] [address]  Phone: [phone]          Patient: Eric Brown   MR Number: [id number]   YOB: 1950   Date of Visit: 5/28/2019       Dear Dr. Amalia Canales:    Thank you for referring Eric Brown to me for evaluation. Attached you will find relevant portions of my assessment and plan of care.    If you have questions, please do not hesitate to call me. I look forward to following Eric Brown along with you.    Sincerely,    Tu Matta Jr., MD    Enclosure  CC:  No Recipients    If you would like to receive this communication electronically, please contact [email or web address] or [phone] to request more information on [facility] access.    For providers and/or their staff who would like to refer a patient to [facility], please contact us through our one-stop-shop provider referral line, [facility], at [phone].    If you feel you have received this communication in error or would no longer like to receive these types of communications, please e-mail [email or web address]

## 2019-06-03 ENCOUNTER — ANESTHESIA (OUTPATIENT)
Dept: SURGERY | Facility: OTHER | Age: 69
End: 2019-06-03
Payer: MEDICARE

## 2019-06-03 ENCOUNTER — ANESTHESIA EVENT (OUTPATIENT)
Dept: SURGERY | Facility: OTHER | Age: 69
End: 2019-06-03
Payer: MEDICARE

## 2019-06-03 ENCOUNTER — APPOINTMENT (OUTPATIENT)
Dept: RADIATION THERAPY | Facility: OTHER | Age: 69
End: 2019-06-03
Attending: RADIOLOGY
Payer: MEDICARE

## 2019-06-03 ENCOUNTER — HOSPITAL ENCOUNTER (OUTPATIENT)
Facility: OTHER | Age: 69
Discharge: HOME | End: 2019-06-03
Attending: OPHTHALMOLOGY | Admitting: OPHTHALMOLOGY
Payer: MEDICARE

## 2019-06-03 VITALS
HEIGHT: 69 IN | TEMPERATURE: 98 F | BODY MASS INDEX: 27.99 KG/M2 | DIASTOLIC BLOOD PRESSURE: 73 MMHG | RESPIRATION RATE: 16 BRPM | OXYGEN SATURATION: 99 % | WEIGHT: 189 LBS | SYSTOLIC BLOOD PRESSURE: 135 MMHG | HEART RATE: 58 BPM

## 2019-06-03 DIAGNOSIS — H25.13 NUCLEAR SCLEROSIS, BILATERAL: ICD-10-CM

## 2019-06-03 PROCEDURE — 63600175 PHARM REV CODE 636 W HCPCS: Performed by: NURSE ANESTHETIST, CERTIFIED REGISTERED

## 2019-06-03 PROCEDURE — 36000707: Performed by: OPHTHALMOLOGY

## 2019-06-03 PROCEDURE — 71000015 HC POSTOP RECOV 1ST HR: Performed by: OPHTHALMOLOGY

## 2019-06-03 PROCEDURE — 25000003 PHARM REV CODE 250: Performed by: OPHTHALMOLOGY

## 2019-06-03 PROCEDURE — 66999 PR FEMTO MFIOL: ICD-10-PCS | Mod: CSM,RT,, | Performed by: OPHTHALMOLOGY

## 2019-06-03 PROCEDURE — 66984 XCAPSL CTRC RMVL W/O ECP: CPT | Mod: RT,,, | Performed by: OPHTHALMOLOGY

## 2019-06-03 PROCEDURE — 66984 PR REMOVAL, CATARACT, W/INSRT INTRAOC LENS, W/O ENDO CYCLO: ICD-10-PCS | Mod: RT,,, | Performed by: OPHTHALMOLOGY

## 2019-06-03 PROCEDURE — 66999 UNLISTED PX ANT SEGMENT EYE: CPT | Mod: CSM,RT,, | Performed by: OPHTHALMOLOGY

## 2019-06-03 PROCEDURE — V2788 PRESBYOPIA-CORRECT FUNCTION: HCPCS | Performed by: OPHTHALMOLOGY

## 2019-06-03 PROCEDURE — 37000009 HC ANESTHESIA EA ADD 15 MINS: Performed by: OPHTHALMOLOGY

## 2019-06-03 PROCEDURE — 37000008 HC ANESTHESIA 1ST 15 MINUTES: Performed by: OPHTHALMOLOGY

## 2019-06-03 PROCEDURE — 36000706: Performed by: OPHTHALMOLOGY

## 2019-06-03 DEVICE — IMPLANTABLE DEVICE: Type: IMPLANTABLE DEVICE | Site: EYE | Status: FUNCTIONAL

## 2019-06-03 RX ORDER — ACETAMINOPHEN 325 MG/1
650 TABLET ORAL EVERY 4 HOURS PRN
Status: DISCONTINUED | OUTPATIENT
Start: 2019-06-03 | End: 2019-06-03 | Stop reason: HOSPADM

## 2019-06-03 RX ORDER — TETRACAINE HYDROCHLORIDE 5 MG/ML
SOLUTION OPHTHALMIC
Status: DISCONTINUED | OUTPATIENT
Start: 2019-06-03 | End: 2019-06-03 | Stop reason: HOSPADM

## 2019-06-03 RX ORDER — MOXIFLOXACIN 5 MG/ML
1 SOLUTION/ DROPS OPHTHALMIC
Status: COMPLETED | OUTPATIENT
Start: 2019-06-03 | End: 2019-06-03

## 2019-06-03 RX ORDER — PHENYLEPHRINE HYDROCHLORIDE 25 MG/ML
1 SOLUTION/ DROPS OPHTHALMIC
Status: COMPLETED | OUTPATIENT
Start: 2019-06-03 | End: 2019-06-03

## 2019-06-03 RX ORDER — TROPICAMIDE 10 MG/ML
1 SOLUTION/ DROPS OPHTHALMIC
Status: COMPLETED | OUTPATIENT
Start: 2019-06-03 | End: 2019-06-03

## 2019-06-03 RX ORDER — MOXIFLOXACIN 5 MG/ML
SOLUTION/ DROPS OPHTHALMIC
Status: DISCONTINUED | OUTPATIENT
Start: 2019-06-03 | End: 2019-06-03 | Stop reason: HOSPADM

## 2019-06-03 RX ORDER — MIDAZOLAM HYDROCHLORIDE 1 MG/ML
INJECTION INTRAMUSCULAR; INTRAVENOUS
Status: DISCONTINUED | OUTPATIENT
Start: 2019-06-03 | End: 2019-06-03

## 2019-06-03 RX ORDER — PHENYLEPHRINE HYDROCHLORIDE 100 MG/ML
1 SOLUTION/ DROPS OPHTHALMIC
Status: DISCONTINUED | OUTPATIENT
Start: 2019-06-03 | End: 2019-06-03 | Stop reason: HOSPADM

## 2019-06-03 RX ORDER — LIDOCAINE HYDROCHLORIDE 40 MG/ML
INJECTION, SOLUTION RETROBULBAR
Status: DISCONTINUED | OUTPATIENT
Start: 2019-06-03 | End: 2019-06-03 | Stop reason: HOSPADM

## 2019-06-03 RX ORDER — MOXIFLOXACIN 5 MG/ML
1 SOLUTION/ DROPS OPHTHALMIC
Status: DISCONTINUED | OUTPATIENT
Start: 2019-06-03 | End: 2019-06-03 | Stop reason: HOSPADM

## 2019-06-03 RX ORDER — TETRACAINE HYDROCHLORIDE 5 MG/ML
1 SOLUTION OPHTHALMIC
Status: COMPLETED | OUTPATIENT
Start: 2019-06-03 | End: 2019-06-03

## 2019-06-03 RX ORDER — PROPARACAINE HYDROCHLORIDE 5 MG/ML
1 SOLUTION/ DROPS OPHTHALMIC
Status: DISCONTINUED | OUTPATIENT
Start: 2019-06-03 | End: 2019-06-03 | Stop reason: HOSPADM

## 2019-06-03 RX ADMIN — MOXIFLOXACIN 1 DROP: 5 SOLUTION/ DROPS OPHTHALMIC at 11:06

## 2019-06-03 RX ADMIN — MOXIFLOXACIN HYDROCHLORIDE 1 DROP: 5 SOLUTION/ DROPS OPHTHALMIC at 09:06

## 2019-06-03 RX ADMIN — TROPICAMIDE 1 DROP: 10 SOLUTION/ DROPS OPHTHALMIC at 09:06

## 2019-06-03 RX ADMIN — TETRACAINE HYDROCHLORIDE 1 DROP: 5 SOLUTION OPHTHALMIC at 09:06

## 2019-06-03 RX ADMIN — PHENYLEPHRINE HYDROCHLORIDE 1 DROP: 25 SOLUTION/ DROPS OPHTHALMIC at 09:06

## 2019-06-03 RX ADMIN — MIDAZOLAM HYDROCHLORIDE 2 MG: 1 INJECTION, SOLUTION INTRAMUSCULAR; INTRAVENOUS at 11:06

## 2019-06-03 NOTE — PLAN OF CARE
Patient arrived to Phase II with no orders.  OR transport called to get orders.  Unable to proceed with phase Ii care.

## 2019-06-03 NOTE — PLAN OF CARE
Eric Nedra Brown has met all discharge criteria from Phase II. Vital Signs are stable, ambulating  without difficulty. Discharge instructions given, patient verbalized understanding. Discharged from facility via wheelchair in stable condition.     Will be escorted via security - wife states she is not sure how to get to car- parked in [facility].

## 2019-06-03 NOTE — ANESTHESIA POSTPROCEDURE EVALUATION
Anesthesia Post Evaluation    Patient: Eric Brown    Procedure(s) Performed: Procedure(s) (LRB):  EXTRACTION, CATARACT, WITH IOL INSERTION (Right)    Final Anesthesia Type: MAC  Patient location during evaluation: [facility]  Patient participation: Yes- Able to Participate  Level of consciousness: awake and alert, awake and oriented  Post-procedure vital signs: reviewed and stable  Pain management: adequate  Airway patency: patent  PONV status at discharge: No PONV  Anesthetic complications: no      Cardiovascular status: blood pressure returned to baseline  Respiratory status: unassisted  Hydration status: euvolemic  Follow-up not needed.          Vitals Value Taken Time   /78 6/3/2019  9:58 AM   Temp 36.7 °C (98.1 °F) 6/3/2019  9:58 AM   Pulse 62 6/3/2019  9:58 AM   Resp 18 6/3/2019  9:58 AM   SpO2 99 % 6/3/2019  9:58 AM         No case tracking events are documented in the log.      Pain/Michael Score: No data recorded

## 2019-06-03 NOTE — ANESTHESIA PREPROCEDURE EVALUATION
06/03/2019  Eric Brown is a 68 y.o., male.    Anesthesia Evaluation    I have reviewed the Patient Summary Reports.    I have reviewed the Nursing Notes.   I have reviewed the Medications.     Review of Systems  Anesthesia Hx:  No problems with previous Anesthesia  Denies Family Hx of Anesthesia complications.   Denies Personal Hx of Anesthesia complications.   Social:  Non-Smoker    Hematology/Oncology:  Hematology Normal       -- Cancer in past history:  Oncology Comments: Lung cancer     EENT/Dental:EENT/Dental Normal   Cardiovascular:   Exercise tolerance: good Hypertension    Pulmonary:  Pulmonary Normal    Renal/:   Chronic Renal Disease    Hepatic/GI:   Liver Disease,    Musculoskeletal:  Musculoskeletal Normal    Neurological:  Neurology Normal    Endocrine:  Endocrine Normal    Dermatological:  Skin Normal    Psych:  Psychiatric Normal           Physical Exam  General:  Well nourished    Airway/Jaw/Neck:  Airway Findings: Mouth Opening: Normal Tongue: Normal  General Airway Assessment: Adult  Mallampati: I  TM Distance: Normal, at least 6 cm  Jaw/Neck Findings:  Neck ROM: Normal ROM      Dental:  Dental Findings: In tact             Anesthesia Plan  Type of Anesthesia, risks & benefits discussed:  Anesthesia Type:  MAC  Patient's Preference:   Intra-op Monitoring Plan:   Intra-op Monitoring Plan Comments:   Post Op Pain Control Plan:   Post Op Pain Control Plan Comments:   Induction:    Beta Blocker:         Informed Consent: Patient understands risks and agrees with Anesthesia plan.  Questions answered. Anesthesia consent signed with patient.  ASA Score: 3     Day of Surgery Review of History & Physical:    H&P update referred to the surgeon.         Ready For Surgery From Anesthesia Perspective.

## 2019-06-03 NOTE — OP NOTE
SURGEON:  Fei Dhillon M.D.    PREOPERATIVE DIAGNOSIS:    Nuclear Sclerotic Cataract Right Eye    POSTOPERATIVE DIAGNOSIS:    Nuclear Sclerotic Cataract Right Eye    PROCEDURES:    Phacoemulsification with  intraocular lens, Right eye ([id number])  With Femtosecond LASER assist    DATE OF SURGERY: 06/03/2019    IMPLANT: ZXROO 12.5    ANESTHESIA:  MAC with topical Lidocaine    COMPLICATIONS:  None    ESTIMATED BLOOD LOSS: None    SPECIMENS: None    INDICATIONS:    The patient has a history of painless progressive visual loss and  difficulty with activities of daily living secondary to cataract formation.  After a thorough discussion of the risks, benefits, and alternatives to cataract surgery, including, but not limited to, the rare risks of infection, retinal detachment, hemorrhage, need for additional surgery, loss of vision, and even loss of the eye, the patient voices understanding and desires to proceed.    DESCRIPTION OF PROCEDURE:    After verification of consent and marking of the operative eye, the patient was positioned under the femtosecond LASER. Topical anesthetic drops were administered. A surgical timeout was initiated with verification of patient identifiers and the laser surgical plan. The eye was docked securely and the laser portion of the cataract procedure was carried out without complication.  The patient was returned to the pre-operative area to await the intraocular surgical portion of the cataract procedure.  The patients IOL calculations were reviewed, and the lens selection confirmed.   After verification and marking of the proper eye in the preop holding area, the patient was brought to the operating room in supine position where the eye was prepped and draped in standard sterile fashion with 5% Betadine and a lid speculum placed in the eye.   Topical 4% Lidocaine was used in addition to the preoperative anesthesia and the procedure was begun by the creation of a paracentesis incision through  which viscoelastic was used to fill the anterior chamber.  Next, a keratome blade was used to create a triplanar temporal clear corneal incision and a cystotome and Utrata forceps used to fashion a continuous curvilinear capsulorrhexis.  Hydrodissection was carried out using the Charles hydrodissection cannula and the nucleus was found to be mobile.  Phacoemulsification of the nucleus was carried out using a quick chop technique, and all remaining epinuclear and cortical material was removed.  The eye was then reformed with Viscoelastic and the  intraocular lens was implanted into the capsular bag.  All remaining viscoelastics were removed from the eye and at the end of the case the pupil was round, the lens was well-centered within the capsular bag and all wounds were found to be water tight.  Drops of Vigamox and Pred Forte were instilled and a shield was placed over the eye. The patient will follow up with Dr. Dhillon in the morning.

## 2019-06-03 NOTE — DISCHARGE SUMMARY
Outcome: Successful outpatient ophthalmic surgical procedure  Preprinted Instructions given to patient.  Regular diet.  Activity: No restrictions  Meds: see Med Rec  Condition: stable  Follow up: 1 day with Dr Dhillon  Disposition: Home  Diagnosis: s/p eye surgery

## 2019-06-04 ENCOUNTER — OFFICE VISIT (OUTPATIENT)
Dept: HEMATOLOGY/ONCOLOGY | Facility: CLINIC | Age: 69
End: 2019-06-04
Payer: MEDICARE

## 2019-06-04 ENCOUNTER — OFFICE VISIT (OUTPATIENT)
Dept: OPHTHALMOLOGY | Facility: CLINIC | Age: 69
End: 2019-06-04
Attending: OPHTHALMOLOGY
Payer: MEDICARE

## 2019-06-04 VITALS
WEIGHT: 191.38 LBS | TEMPERATURE: 98 F | HEART RATE: 72 BPM | BODY MASS INDEX: 28.35 KG/M2 | DIASTOLIC BLOOD PRESSURE: 82 MMHG | HEIGHT: 69 IN | OXYGEN SATURATION: 97 % | RESPIRATION RATE: 16 BRPM | SYSTOLIC BLOOD PRESSURE: 150 MMHG

## 2019-06-04 DIAGNOSIS — G89.3 NEOPLASM RELATED PAIN: ICD-10-CM

## 2019-06-04 DIAGNOSIS — C78.6 SECONDARY ADENOCARCINOMA OF RETROPERITONEUM: ICD-10-CM

## 2019-06-04 DIAGNOSIS — Z98.890 POST-OPERATIVE STATE: Primary | ICD-10-CM

## 2019-06-04 DIAGNOSIS — C78.00 MALIGNANT NEOPLASM METASTATIC TO LUNG, UNSPECIFIED LATERALITY: ICD-10-CM

## 2019-06-04 DIAGNOSIS — C22.1 INTRAHEPATIC CHOLANGIOCARCINOMA: Primary | ICD-10-CM

## 2019-06-04 PROCEDURE — 99999 PR PBB SHADOW E&M-EST. PATIENT-LVL IV: ICD-10-PCS | Mod: PBBFAC,,, | Performed by: INTERNAL MEDICINE

## 2019-06-04 PROCEDURE — 99999 PR PBB SHADOW E&M-EST. PATIENT-LVL IV: CPT | Mod: PBBFAC,,, | Performed by: INTERNAL MEDICINE

## 2019-06-04 PROCEDURE — 99024 PR POST-OP FOLLOW-UP VISIT: ICD-10-PCS | Mod: S$GLB,,, | Performed by: OPHTHALMOLOGY

## 2019-06-04 PROCEDURE — 99214 OFFICE O/P EST MOD 30 MIN: CPT | Mod: S$GLB,,, | Performed by: INTERNAL MEDICINE

## 2019-06-04 PROCEDURE — 99214 PR OFFICE/OUTPT VISIT, EST, LEVL IV, 30-39 MIN: ICD-10-PCS | Mod: S$GLB,,, | Performed by: INTERNAL MEDICINE

## 2019-06-04 PROCEDURE — 99999 PR PBB SHADOW E&M-EST. PATIENT-LVL II: CPT | Mod: PBBFAC,,, | Performed by: OPHTHALMOLOGY

## 2019-06-04 PROCEDURE — 99024 POSTOP FOLLOW-UP VISIT: CPT | Mod: S$GLB,,, | Performed by: OPHTHALMOLOGY

## 2019-06-04 PROCEDURE — 99999 PR PBB SHADOW E&M-EST. PATIENT-LVL II: ICD-10-PCS | Mod: PBBFAC,,, | Performed by: OPHTHALMOLOGY

## 2019-06-04 RX ORDER — OXYCODONE AND ACETAMINOPHEN 5; 325 MG/1; MG/1
1 TABLET ORAL EVERY 6 HOURS PRN
Qty: 120 TABLET | Refills: 0 | Status: SHIPPED | OUTPATIENT
Start: 2019-06-04 | End: 2019-07-01 | Stop reason: SDUPTHER

## 2019-06-04 RX ORDER — OXYCODONE AND ACETAMINOPHEN 5; 325 MG/1; MG/1
1 TABLET ORAL EVERY 12 HOURS PRN
COMMUNITY
End: 2019-06-04 | Stop reason: SDUPTHER

## 2019-06-04 RX ORDER — MORPHINE SULFATE 15 MG/1
15 TABLET, FILM COATED, EXTENDED RELEASE ORAL 2 TIMES DAILY
Qty: 60 TABLET | Refills: 0 | Status: SHIPPED | OUTPATIENT
Start: 2019-06-04 | End: 2019-06-10

## 2019-06-04 NOTE — PROGRESS NOTES
"Subjective:       Patient ID: Eric Brown is a 68 y.o. male.    Chief Complaint: Intrahepatic cholangiocarcinoma  ONCOLOGIC HISTORY:  Mr. Brown is a 67 year old male who in 6/2012 underwent a distal pancreatectomy for a PNET and a liver resection for a T2N0M0 intrahepatic cholangiocarcinoma.  Unfortunately had a recurrence of the cholangiocarcinoma and underwent a repeat resection at [facility].  Completed adjuvant treatment with cisplatin and gemcitabine - 6 cycles.     He has been on surveillance.     He underwent PET scan on 1/3/18 at [facility] which reveals "Scattered subcentimeter pulmonary nodules, some new/larger than on 07/31/2017, nonspecific, possibly metastatic or inflammatory, to be followed.  Small metabolically active foci in the liver, at sites of previously noted subcentimeter hypervascular nodules on liver protocol CT dated 10/02/2017, concerning for metastases Metabolically active periportal and retroperitoneal adenopathy, larger than on 10/02/2017, consistent with metastatic disease. Spiculated soft tissue in the pancreatectomy bed region, with low-grade metabolic activity, most likely related to postsurgical change, but local recurrence cannot be entirely excluded, to be followed"  He underwent biopsy of soft tissue of peritoneum and pathology revealed metastatic cancer compatible with mets from cholangiocarcinoma. He has been recommended to start chemo with Cisplatin and gemzar and they are also evaluating him for trials.           He completed 3 cycles of Cisplatin and Gemzar until 3/18 and progressed  He then went to [facility] and started Protocol with  4/2018 - 12/2018   He recently underwent MRI abdomen at [facility] on 1/28/19 which reveals "Stable to slightly increased size of metastatic disease in the liver remnant (2 lesions). Other arterial foci are nonspecific and may represent shunt phenomena. Focal enhancement in the pancreatic resection bed is stable compared to " "11/07/2018 and nonspecific. Surgical bed fluid has resolved. Retroperitoneal lymphadenopathy has moderately increased"  CT chest on 1/29/19 reveals Compared to 11/07/2018, innumerable metastatic lung nodules have increased in size.  He was on a PARP inhibitor trial Niraparib since 3/1/19 at [facility]. CT scans in May from [facility] show progression         HPIHe comes in to review his scans (CT chest and MRI abdomen) from [facility] which reveal "Stable liver metastases.  Stable metastatic retroperitoneal lymphadenopathy obstructing the right kidney. New and enlarging bone metastases. Increase in lung metastases"  He has been recommended a phase 1 trial with anti PD1/LAG 3       Review of Systems   Constitutional: Negative for appetite change, fatigue and unexpected weight change.   HENT: Negative for mouth sores.    Eyes: Negative for visual disturbance.   Respiratory: Negative for cough and shortness of breath.    Cardiovascular: Negative for chest pain.   Gastrointestinal: Negative for abdominal pain and diarrhea.   Genitourinary: Negative for frequency.   Musculoskeletal: Negative for back pain.   Skin: Negative for rash.   Neurological: Negative for headaches.   Hematological: Negative for adenopathy.   Psychiatric/Behavioral: The patient is not nervous/anxious.    All other systems reviewed and are negative.      PMFSH: all information reviewed and updated as relevant to today's visit  Objective:      Physical Exam   Constitutional: He is oriented to person, place, and time. He appears well-developed and well-nourished.   HENT:   Mouth/Throat: No oropharyngeal exudate.   Cardiovascular: Normal rate and normal heart sounds.   Pulmonary/Chest: Effort normal and breath sounds normal. He has no wheezes.   Abdominal: Soft. Bowel sounds are normal. There is no tenderness.   Musculoskeletal: He exhibits no edema or tenderness.   Lymphadenopathy:     He has no cervical adenopathy.   Neurological: He is alert and " oriented to person, place, and time. Coordination normal.   Skin: Skin is warm and dry. No rash noted.   Psychiatric: He has a normal mood and affect. Judgment and thought content normal.   Vitals reviewed.      LABS:  WBC   Date Value Ref Range Status   05/06/2019 7.16 3.90 - 12.70 K/uL Final     Hemoglobin   Date Value Ref Range Status   05/06/2019 10.4 (L) 14.0 - 18.0 g/dL Final     Hematocrit   Date Value Ref Range Status   05/06/2019 31.9 (L) 40.0 - 54.0 % Final     Platelets   Date Value Ref Range Status   05/06/2019 242 150 - 350 K/uL Final     Gran # (ANC)   Date Value Ref Range Status   05/06/2019 5.1 1.8 - 7.7 K/uL Final     Comment:     The ANC is based on a white cell differential from an   automated cell counter. It has not been microscopically   reviewed for the presence of abnormal cells. Clinical   correlation is required.         Chemistry        Component Value Date/Time     05/06/2019 1522    K 5.0 05/06/2019 1522     05/06/2019 1522    CO2 26 05/06/2019 1522    BUN 21 05/06/2019 1522    CREATININE 1.6 (H) 05/06/2019 1522     05/06/2019 1522        Component Value Date/Time    CALCIUM 9.6 05/06/2019 1522    ALKPHOS 104 05/06/2019 1522    AST 25 05/06/2019 1522    ALT 29 05/06/2019 1522    BILITOT 0.4 05/06/2019 1522    ESTGFRAFRICA 50.1 (A) 05/06/2019 1522    EGFRNONAA 43.3 (A) 05/06/2019 1522          Assessment:       1. Intrahepatic cholangiocarcinoma    2. Malignant neoplasm metastatic to lung, unspecified laterality    3. Secondary adenocarcinoma of retroperitoneum    4. Neoplasm related pain        Plan:        1,2,3,4. Reviewed records from [facility]. He will proceed with trial over there and keep ion touch with me with regards to his progress.   Refilled pain meds.    Above care plan was discussed with patient and accompanying wife and son and all questions were addressed to their satisfaction

## 2019-06-04 NOTE — PROGRESS NOTES
HPI     1 day post op CE with IOL OD (Symfony) (6/3/19).  Pt states OD seems to be doing well. OD felt some soreness from the sx but   not bad. No flashes or floaters OU.    Pt wants to reschedule OS sx and also discuss mono vision.     Pt confirms using   PGB TID OD    Last edited by Annita Richardson on 6/4/2019  1:29 PM. (History)            Assessment /Plan     For exam results, see Encounter Report.    Post-operative state      Slit lamp exam:  L/L: nl  K: clear, wound sealed  AC: 1+ cell  Lens: IOL centered and stable    POD1 s/p Phaco/IOL  Appropriate precautions and post op medications reviewed.  Patient instructed to call or come in if symptoms of redness, decreased vision, or pain are experienced.  Fibrin noted in pupil, but AC quiet

## 2019-06-10 ENCOUNTER — PATIENT MESSAGE (OUTPATIENT)
Dept: HEMATOLOGY/ONCOLOGY | Facility: CLINIC | Age: 69
End: 2019-06-10

## 2019-06-10 DIAGNOSIS — C78.00 MALIGNANT NEOPLASM METASTATIC TO LUNG, UNSPECIFIED LATERALITY: Primary | ICD-10-CM

## 2019-06-10 DIAGNOSIS — G89.3 NEOPLASM RELATED PAIN: ICD-10-CM

## 2019-06-10 RX ORDER — MORPHINE SULFATE 30 MG/1
30 TABLET, FILM COATED, EXTENDED RELEASE ORAL 2 TIMES DAILY
Qty: 60 TABLET | Refills: 0 | Status: SHIPPED | OUTPATIENT
Start: 2019-06-10 | End: 2019-07-01

## 2019-06-12 ENCOUNTER — OFFICE VISIT (OUTPATIENT)
Dept: OPHTHALMOLOGY | Facility: CLINIC | Age: 69
End: 2019-06-12
Payer: MEDICARE

## 2019-06-12 DIAGNOSIS — Z98.890 POST-OPERATIVE STATE: ICD-10-CM

## 2019-06-12 DIAGNOSIS — H25.13 NUCLEAR SCLEROSIS, BILATERAL: Primary | ICD-10-CM

## 2019-06-12 PROCEDURE — 99024 POSTOP FOLLOW-UP VISIT: CPT | Mod: S$GLB,,, | Performed by: OPHTHALMOLOGY

## 2019-06-12 PROCEDURE — 99999 PR PBB SHADOW E&M-EST. PATIENT-LVL II: CPT | Mod: PBBFAC,,, | Performed by: OPHTHALMOLOGY

## 2019-06-12 PROCEDURE — 99999 PR PBB SHADOW E&M-EST. PATIENT-LVL II: ICD-10-PCS | Mod: PBBFAC,,, | Performed by: OPHTHALMOLOGY

## 2019-06-12 PROCEDURE — 99024 PR POST-OP FOLLOW-UP VISIT: ICD-10-PCS | Mod: S$GLB,,, | Performed by: OPHTHALMOLOGY

## 2019-06-12 RX ORDER — MOXIFLOXACIN 5 MG/ML
1 SOLUTION/ DROPS OPHTHALMIC
Status: CANCELLED | OUTPATIENT
Start: 2019-06-12

## 2019-06-12 RX ORDER — TROPICAMIDE 10 MG/ML
1 SOLUTION/ DROPS OPHTHALMIC
Status: CANCELLED | OUTPATIENT
Start: 2019-06-12

## 2019-06-12 RX ORDER — TETRACAINE HYDROCHLORIDE 5 MG/ML
1 SOLUTION OPHTHALMIC
Status: CANCELLED | OUTPATIENT
Start: 2019-06-12

## 2019-06-12 RX ORDER — PHENYLEPHRINE HYDROCHLORIDE 25 MG/ML
1 SOLUTION/ DROPS OPHTHALMIC
Status: CANCELLED | OUTPATIENT
Start: 2019-06-12

## 2019-06-12 NOTE — H&P (VIEW-ONLY)
HPI     s/p CE with IOL OD (Symfony) (6/3/19).     PGB TID OD    Pt here for 1 week post op OD. Pt states doing well. Pt denies eye pain   OD.     Last edited by Hilary Judge MA on 6/12/2019  2:14 PM.   (History)            Assessment /Plan     For exam results, see Encounter Report.    Nuclear sclerosis, bilateral    Post-operative state    Slit lamp exam:  L/L: nl  K: clear, wound sealed  AC: trace cell  Iris/Lens: IOL centered and stable    POW1 s/p phaco: Surgery healing well with no signs of infection or abnormal inflammation.    Patient wishes to proceed with surgery in the second eye. Risks, benefits, alternatives reviewed. IOL selection reviewed.     Left eye  IOL: BTH522 12.0 at 85 (ERM, discussed limitations and retina eval)    The patient expresses a desire to reduce spectacle dependence. I reviewed various IOL and LASER refractive surgical options and we will attempt to minimize spectacle dependence by managing astigmatism and optimizing IOL selection. Femtosecond LASER assisted cataract surgery (FLACS) technology was explained to the patient with educational videos and discussion.  The patient voices understanding and wishes to implement this technology during the cataract procedure.  I explained the increased precision of the LASER versus manual techniques, especially as it relates to astigmatism reduction with arcuate incisions.  I emphasized that although our goal is to reduce the need for refractive correction after surgery, there may still be a need for spectacle correction to achieve optimal visual acuity, and that a reasonable range of functional vision should be the expectation.  No guarantees are made about post operative refraction or visual acuity, as the eye may heal in unpredictable ways, and the standard risks, benefits, and alternatives to cataract surgery were explained.  The patient understands that the refractive portions of this cataract procedure are not covered by  insurance, and that there is an out of pocket expense of $2250 per eye. I also explained that even though our pre-operative plan is to utilize advanced refractive technologies during surgery, that I may decide to eliminate part or all of this plan if surgical challenges or complications arise, or I feel that it is not in the patient's best interest. Consent forms and an ABN form were given to the patient to review.      Catalys Parameters:    Left Eye:   SERGO:  12mm   ?Need to marc patient sitting up?: Yes  Capsulotomy: Scanned Capsule  stGstrstastdstest:st st1st Arcuate: Toric Marc: at  Axis: 84   Incisions:  OFF

## 2019-06-13 ENCOUNTER — TELEPHONE (OUTPATIENT)
Dept: OPTOMETRY | Facility: CLINIC | Age: 69
End: 2019-06-13

## 2019-06-13 ENCOUNTER — TELEPHONE (OUTPATIENT)
Dept: RADIATION ONCOLOGY | Facility: CLINIC | Age: 69
End: 2019-06-13

## 2019-06-13 ENCOUNTER — TELEPHONE (OUTPATIENT)
Dept: HEMATOLOGY/ONCOLOGY | Facility: CLINIC | Age: 69
End: 2019-06-13

## 2019-06-13 NOTE — TELEPHONE ENCOUNTER
Spoke with patient to confirm his appointment on 6/14 with Dr. Fuentes at 8 am. Patient confirmed date and time.

## 2019-06-13 NOTE — TELEPHONE ENCOUNTER
Spoke with wife. [facility] appointment made per Analilia for 6/17/2019 @ 11am. Wife verbalized understanding. Appointment slip mailed. ----- Message from Shane Bustamante sent at 6/13/2019  7:10 AM CDT -----  Contact: vicky  Appointment Request From: Eric Brown    With Provider: Tu Matta Jr, MD [[facility] - Radiation Oncology]    Preferred Date Range: Any date 6/12/2019 or later    Preferred Times: Any time    Reason for visit: Existing Patient    Comments:  XRT schedule

## 2019-06-14 ENCOUNTER — INITIAL CONSULT (OUTPATIENT)
Dept: HEMATOLOGY/ONCOLOGY | Facility: CLINIC | Age: 69
End: 2019-06-14
Payer: MEDICARE

## 2019-06-14 VITALS
HEART RATE: 63 BPM | DIASTOLIC BLOOD PRESSURE: 79 MMHG | HEIGHT: 69 IN | TEMPERATURE: 98 F | WEIGHT: 199.06 LBS | BODY MASS INDEX: 29.48 KG/M2 | RESPIRATION RATE: 16 BRPM | SYSTOLIC BLOOD PRESSURE: 140 MMHG | OXYGEN SATURATION: 97 %

## 2019-06-14 DIAGNOSIS — Z51.5 PALLIATIVE CARE BY SPECIALIST: Primary | ICD-10-CM

## 2019-06-14 DIAGNOSIS — R53.0 NEOPLASTIC (MALIGNANT) RELATED FATIGUE: ICD-10-CM

## 2019-06-14 PROCEDURE — 99205 OFFICE O/P NEW HI 60 MIN: CPT | Mod: S$GLB,,, | Performed by: INTERNAL MEDICINE

## 2019-06-14 PROCEDURE — 99999 PR PBB SHADOW E&M-EST. PATIENT-LVL III: ICD-10-PCS | Mod: PBBFAC,,, | Performed by: INTERNAL MEDICINE

## 2019-06-14 PROCEDURE — 99999 PR PBB SHADOW E&M-EST. PATIENT-LVL III: CPT | Mod: PBBFAC,,, | Performed by: INTERNAL MEDICINE

## 2019-06-14 PROCEDURE — 99205 PR OFFICE/OUTPT VISIT, NEW, LEVL V, 60-74 MIN: ICD-10-PCS | Mod: S$GLB,,, | Performed by: INTERNAL MEDICINE

## 2019-06-14 RX ORDER — PREDNISONE 5 MG/1
TABLET ORAL
Qty: 60 TABLET | Refills: 5 | Status: SHIPPED | OUTPATIENT
Start: 2019-06-14 | End: 2019-08-26

## 2019-06-14 RX ORDER — TRIAMCINOLONE ACETONIDE 1 MG/G
CREAM TOPICAL
COMMUNITY
Start: 2019-05-06 | End: 2019-09-16

## 2019-06-14 NOTE — PROGRESS NOTES
"Consult Note  Palliative Care      Consult Requested By: Dr. Amalia Canales  Reason for Consult: Fatigue and pain control.       ASSESSMENT/PLAN:     Plan/Recommendations:  Eric was seen today for consult.    Diagnoses and all orders for this visit:    Palliative care by specialist  -     predniSONE (DELTASONE) 5 MG tablet; Take 20 mg for 3 d 15 mg for 3 d 10 mg for 3 d 5 mg daily thereafter  Discussed home health/hospice "when the time comes". Patient does not want to be a burden to wife or kids. Hopes he dies quickly when disease progresses-preferably after good times with family.    Neoplastic (malignant) related fatigue  -     predniSONE (DELTASONE) 5 MG tablet; Take 20 mg for 3 d 15 mg for 3 d 10 mg for 3 d 5 mg daily thereafter.  We talked about use for fatigue as well as for low back pain. Discussed risk/benefit. Little worry about long term side effects/complications. He is agreeable. He is seeing Dr. Matta next week for possible XRT. (Has had chronic back pain in the past but this is different)          Ethical / Legal Advance Care Planning      - surrogate decision maker: Name: Elicia, Relationship: wife. Then son Wayne and daughter Roshni   - Code Status: full for now. Wants fixable things fixed. Does not want death to be prolonged.    - LaPOST: none    - other advance directive: yes, Capacity to make medical decisions: yes, Conflicts: NONE Patient does not want to be a burden to wife or kids. Hopes he dies quickly when disease progresses-preferably after good times with family.         SUBJECTIVE:     History of Present Illness:  Patient is a 68 y.o. year old male presenting with fatigue and low back pain. His pain is controlled but is still experimenting with dose of MS Contin and oxycodone. Currently taking MS Contin 30 mg at bedtime and percocet during the day.     Past Medical History:   Diagnosis Date    A-fib     s/p ablation currently sinus rhythm    BPH (benign prostatic hypertrophy) with " urinary obstruction     Cataract     Cholangiocarcinoma of liver 1-22-15    Gout, chronic     Heart disease     Mitral valve    Hx of colonic polyps     Hyperlipidemia     Hypertension     Intrahepatic cholangiocarcinoma 5-2-12    s/p resection, followed at [facility]    Kidney stone     Prediabetes     Vitreous floaters      Past Surgical History:   Procedure Laterality Date    BIOPSY, BLADDER N/A 11/27/2018    Performed by [facility]ger [facility]era [facility] at [facility] OR 1ST FLR    CARDIAC SURGERY      CHOLECYSTECTOMY      5-2012    COLONOSCOPY N/A 11/24/2015    Performed by Emma Dhillon MD at [facility] ENDO (4TH FLR)    CYSTOSCOPY Right 4/16/2019    Performed by [facility] MD at [facility] OR 1ST FLR    CYSTOSCOPY  11/27/2018    Performed by [facility]era [facility] at [facility] OR [facility]    EXTRACORPOREAL SHOCK WAVE LITHOTRIPSY  2007 and 2008    EXTRACTION, CATARACT, WITH IOL INSERTION Right 6/3/2019    [facility]    litho      LIVER RESECTION  5-2012    Distal pancreatectomy    LIVER RESECTION  1-22-15    Hepatic cholangiocarcinoma    MITRAL VALVE SURGERY      PLACEMENT-STENT Right 11/27/2018    Performed by Rodger N[facility]era MD at [facility] OR 1ST FLR    PYELOGRAM, RETROGRADE Right 4/16/2019    Performed by Rodger N[facility]era MD at [facility] OR 1ST FLR    REMOVAL-PORT-A-CATH N/A 2/22/2016    Performed by [facility] at [facility] OR 2ND FLR    REPLACEMENT, STENT Right 4/16/2019    Performed by Rodger Nog[facility]era MD at [facility] OR 1ST FLR    URETEROSCOPY Right 4/16/2019    Performed by [facility]era [facility] at [facility] OR [facility]     Family History   Problem Relation Age of Onset    Hypertension Mother     Cataracts Father     Amblyopia Neg Hx     Blindness Neg Hx     Glaucoma Neg Hx     Macular degeneration Neg Hx     Retinal detachment Neg Hx     Strabismus Neg Hx     Melanoma Neg Hx      Review of patient's allergies indicates:   Allergen Reactions    Iodine and iodide containing  products Rash     5/22/18 premedicated prenednsone 50 mg po X3 and benadryl 50 mg po tablet  Prior to  Ct scan.  7/3/18- As per pt last scan 6 weeks ago took 13 hr prep med prednisone 50 mg & benadryl 50 mg tablet & was tolerated well post scan    Iohexol Hives and Rash     Rash on L leg, resolves in 1 hour. Pt did well with 13hr prep of Prednisone 50mg POx3 doses and Benadryl 50mg PO during CT done back in 2/2017.        Medications:    Current Outpatient Medications:     allopurinol (ZYLOPRIM) 100 MG tablet, TAKE 1 TABLET (100 MG TOTAL) BY MOUTH ONCE DAILY., Disp: 90 tablet, Rfl: 3    atorvastatin (LIPITOR) 10 MG tablet, TAKE 1 TABLET (10 MG TOTAL) BY MOUTH ONCE DAILY., Disp: 90 tablet, Rfl: 2    cholecalciferol, vitamin D3, (VITAMIN D3) 1,000 unit capsule, Take 5,000 mg by mouth., Disp: , Rfl:     felodipine (PLENDIL) 5 MG 24 hr tablet, Take 1 tablet (5 mg total) by mouth 2 (two) times daily., Disp: 60 tablet, Rfl: 11    metoprolol succinate (TOPROL-XL) 100 MG 24 hr tablet, Take 1 tablet (100 mg total) by mouth once daily., Disp: 60 tablet, Rfl: 6    morphine (MS CONTIN) 30 MG 12 hr tablet, Take 1 tablet (30 mg total) by mouth 2 (two) times daily., Disp: 60 tablet, Rfl: 0    MULTIVIT-IRON-MIN-FOLIC ACID 3,500-18-0.4 UNIT-MG-MG ORAL CHEW, Take by mouth., Disp: , Rfl:     multivit-min-FA-lycopen-lutein 0.4-300-250 mg-mcg-mcg Tab, Take by mouth., Disp: , Rfl:     omeprazole (PRILOSEC) 20 MG capsule, Take 1 capsule (20 mg total) by mouth once daily., Disp: 90 capsule, Rfl: 3    ondansetron (ZOFRAN) 8 MG tablet, Take 1 tablet (8 mg total) by mouth 4 (four) times daily as needed for Nausea., Disp: 60 tablet, Rfl: 2    oxyCODONE-acetaminophen (PERCOCET) 5-325 mg per tablet, Take 1 tablet by mouth every 6 (six) hours as needed for Pain., Disp: 120 tablet, Rfl: 0    promethazine (PHENERGAN) 25 MG tablet, Take 25 mg by mouth every 6 (six) hours as needed., Disp: , Rfl: 7    tamsulosin (FLOMAX) 0.4 mg Cap,  Take 1 capsule (0.4 mg total) by mouth once daily., Disp: 60 capsule, Rfl: 6    triamcinolone acetonide 0.1% (KENALOG) 0.1 % cream, AAA bid, Disp: , Rfl:     predniSONE (DELTASONE) 5 MG tablet, Take 20 mg for 3 d 15 mg for 3 d 10 mg for 3 d 5 mg daily thereafter, Disp: 60 tablet, Rfl: 5      24h Oral Morphine Equivalents (OME):     OBJECTIVE:   Symptom Assessment (ESAS 0-10 scale)     ESAS 0 1 2 3 4 5 6 7 8 9 10   Pain   x           Dyspnea x             Anxiety x             Nausea x             Depression  x             Anorexia x             Fatigue    x          Insomnia x             Restlessness  x             Agitation x             Constipation    yes  Bowel Management Plan (BMP): yes  Diarrhea        no  Comments:   Performance Status: PPS Score 90  ROS:  Review of Systems   Constitutional: Positive for activity change and fatigue.   HENT: Negative.    Eyes: Negative.    Respiratory: Negative.    Cardiovascular: Negative.    Gastrointestinal: Negative.    Endocrine: Negative.    Genitourinary: Negative.    Musculoskeletal: Positive for back pain.   Skin: Negative.    Allergic/Immunologic: Negative.    Neurological: Negative.    Hematological: Negative.    Psychiatric/Behavioral: Negative.        Physical Exam:  Vitals: Temp: 98.3 °F (36.8 °C) (06/14/19 0811)  Pulse: 63 (06/14/19 0811)  Resp: 16 (06/14/19 0811)  BP: (!) 140/79 (06/14/19 0811)  SpO2: 97 % (06/14/19 0811)  Physical Exam   Constitutional: He is oriented to person, place, and time and well-developed, well-nourished, and in no distress.   HENT:   Head: Normocephalic and atraumatic.   Eyes: Pupils are equal, round, and reactive to light. Conjunctivae and EOM are normal.   Neck: Normal range of motion. Neck supple.   Musculoskeletal: Normal range of motion.   Neurological: He is alert and oriented to person, place, and time. Gait normal. GCS score is 15.   Skin: Skin is warm and dry.   Psychiatric: Mood, memory, affect and judgment normal.    Nursing note and vitals reviewed.      Labs:  CBC:   WBC   Date Value Ref Range Status   05/06/2019 7.16 3.90 - 12.70 K/uL Final     Hemoglobin   Date Value Ref Range Status   05/06/2019 10.4 (L) 14.0 - 18.0 g/dL Final     Hematocrit   Date Value Ref Range Status   05/06/2019 31.9 (L) 40.0 - 54.0 % Final     Mean Corpuscular Volume   Date Value Ref Range Status   05/06/2019 98 82 - 98 fL Final     Platelets   Date Value Ref Range Status   05/06/2019 242 150 - 350 K/uL Final           LFT:   Lab Results   Component Value Date    AST 25 05/06/2019    ALKPHOS 104 05/06/2019    BILITOT 0.4 05/06/2019       Albumin:   Albumin   Date Value Ref Range Status   05/06/2019 3.3 (L) 3.5 - 5.2 g/dL Final     Protein:   Total Protein   Date Value Ref Range Status   05/06/2019 7.6 6.0 - 8.4 g/dL Final       Radiology:None    Psychosocial/Cultural/Spiritual: Retired  (PhD; 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  C - Community yes    A - Address in Care yes      > 50% of 60 min visit spent in chart review, face to face discussion of goals of care,  symptom assessment, coordination of care and emotional support.      Signature: Lindsay Fuentes MD

## 2019-06-14 NOTE — LETTER
June 14, 2019      Amlaia Canales MD  [address]  [facility] [address]           [facility] Hematology Oncology  [address]  [facility] [address]  Phone: [phone]          Patient: Eric Brown   MR Number: [id number]   YOB: 1950   Date of Visit: 6/14/2019       Dear Dr. Amalia Canales:    Thank you for referring Eric Brown to me for evaluation. Attached you will find relevant portions of my assessment and plan of care.    If you have questions, please do not hesitate to call me. I look forward to following Eric Brown along with you.    Sincerely,    Lindsay Fuentes MD    Enclosure  CC:  No Recipients    If you would like to receive this communication electronically, please contact [email or web address] or [phone] to request more information on [employer] Link access.    For providers and/or their staff who would like to refer a patient to [facility], please contact us through our one-stop-shop provider referral line, [facility], at [phone].    If you feel you have received this communication in error or would no longer like to receive these types of communications, please e-mail [email or web address]         
English

## 2019-06-17 ENCOUNTER — ANESTHESIA (OUTPATIENT)
Dept: SURGERY | Facility: OTHER | Age: 69
End: 2019-06-17
Payer: MEDICARE

## 2019-06-17 ENCOUNTER — ANESTHESIA EVENT (OUTPATIENT)
Dept: SURGERY | Facility: OTHER | Age: 69
End: 2019-06-17
Payer: MEDICARE

## 2019-06-17 ENCOUNTER — HOSPITAL ENCOUNTER (OUTPATIENT)
Facility: OTHER | Age: 69
Discharge: HOME | End: 2019-06-17
Attending: OPHTHALMOLOGY | Admitting: OPHTHALMOLOGY
Payer: MEDICARE

## 2019-06-17 VITALS
BODY MASS INDEX: 28.79 KG/M2 | HEIGHT: 68 IN | RESPIRATION RATE: 16 BRPM | TEMPERATURE: 98 F | HEART RATE: 54 BPM | SYSTOLIC BLOOD PRESSURE: 123 MMHG | WEIGHT: 190 LBS | OXYGEN SATURATION: 96 % | DIASTOLIC BLOOD PRESSURE: 69 MMHG

## 2019-06-17 DIAGNOSIS — H25.13 NUCLEAR SCLEROSIS, BILATERAL: ICD-10-CM

## 2019-06-17 PROCEDURE — 37000009 HC ANESTHESIA EA ADD 15 MINS: Performed by: OPHTHALMOLOGY

## 2019-06-17 PROCEDURE — V2787 ASTIGMATISM-CORRECT FUNCTION: HCPCS | Mod: WS | Performed by: OPHTHALMOLOGY

## 2019-06-17 PROCEDURE — 36000707: Performed by: OPHTHALMOLOGY

## 2019-06-17 PROCEDURE — 25000003 PHARM REV CODE 250: Performed by: OPHTHALMOLOGY

## 2019-06-17 PROCEDURE — 66984 PR REMOVAL, CATARACT, W/INSRT INTRAOC LENS, W/O ENDO CYCLO: ICD-10-PCS | Mod: 79,LT,, | Performed by: OPHTHALMOLOGY

## 2019-06-17 PROCEDURE — 66999 UNLISTED PX ANT SEGMENT EYE: CPT | Mod: CSM,LT,, | Performed by: OPHTHALMOLOGY

## 2019-06-17 PROCEDURE — 66999 PR FEMTO MFIOL: ICD-10-PCS | Mod: CSM,LT,, | Performed by: OPHTHALMOLOGY

## 2019-06-17 PROCEDURE — 71000015 HC POSTOP RECOV 1ST HR: Performed by: OPHTHALMOLOGY

## 2019-06-17 PROCEDURE — 63600175 PHARM REV CODE 636 W HCPCS: Performed by: NURSE ANESTHETIST, CERTIFIED REGISTERED

## 2019-06-17 PROCEDURE — 36000706: Performed by: OPHTHALMOLOGY

## 2019-06-17 PROCEDURE — 66984 XCAPSL CTRC RMVL W/O ECP: CPT | Mod: 79,LT,, | Performed by: OPHTHALMOLOGY

## 2019-06-17 PROCEDURE — 37000008 HC ANESTHESIA 1ST 15 MINUTES: Performed by: OPHTHALMOLOGY

## 2019-06-17 RX ORDER — ACETAMINOPHEN 325 MG/1
650 TABLET ORAL EVERY 4 HOURS PRN
Status: DISCONTINUED | OUTPATIENT
Start: 2019-06-17 | End: 2019-06-17 | Stop reason: HOSPADM

## 2019-06-17 RX ORDER — PHENYLEPHRINE HYDROCHLORIDE 100 MG/ML
1 SOLUTION/ DROPS OPHTHALMIC
Status: DISCONTINUED | OUTPATIENT
Start: 2019-06-17 | End: 2019-06-17 | Stop reason: HOSPADM

## 2019-06-17 RX ORDER — PROPARACAINE HYDROCHLORIDE 5 MG/ML
1 SOLUTION/ DROPS OPHTHALMIC
Status: DISCONTINUED | OUTPATIENT
Start: 2019-06-17 | End: 2019-06-17 | Stop reason: HOSPADM

## 2019-06-17 RX ORDER — LIDOCAINE HYDROCHLORIDE 40 MG/ML
INJECTION, SOLUTION RETROBULBAR
Status: DISCONTINUED | OUTPATIENT
Start: 2019-06-17 | End: 2019-06-17 | Stop reason: HOSPADM

## 2019-06-17 RX ORDER — TROPICAMIDE 10 MG/ML
1 SOLUTION/ DROPS OPHTHALMIC
Status: COMPLETED | OUTPATIENT
Start: 2019-06-17 | End: 2019-06-17

## 2019-06-17 RX ORDER — MOXIFLOXACIN 5 MG/ML
1 SOLUTION/ DROPS OPHTHALMIC
Status: COMPLETED | OUTPATIENT
Start: 2019-06-17 | End: 2019-06-17

## 2019-06-17 RX ORDER — TETRACAINE HYDROCHLORIDE 5 MG/ML
SOLUTION OPHTHALMIC
Status: DISCONTINUED | OUTPATIENT
Start: 2019-06-17 | End: 2019-06-17 | Stop reason: HOSPADM

## 2019-06-17 RX ORDER — MOXIFLOXACIN 5 MG/ML
SOLUTION/ DROPS OPHTHALMIC
Status: DISCONTINUED | OUTPATIENT
Start: 2019-06-17 | End: 2019-06-17 | Stop reason: HOSPADM

## 2019-06-17 RX ORDER — TETRACAINE HYDROCHLORIDE 5 MG/ML
1 SOLUTION OPHTHALMIC
Status: COMPLETED | OUTPATIENT
Start: 2019-06-17 | End: 2019-06-17

## 2019-06-17 RX ORDER — MIDAZOLAM HYDROCHLORIDE 1 MG/ML
INJECTION INTRAMUSCULAR; INTRAVENOUS
Status: DISCONTINUED | OUTPATIENT
Start: 2019-06-17 | End: 2019-06-17

## 2019-06-17 RX ORDER — PHENYLEPHRINE HYDROCHLORIDE 25 MG/ML
1 SOLUTION/ DROPS OPHTHALMIC
Status: COMPLETED | OUTPATIENT
Start: 2019-06-17 | End: 2019-06-17

## 2019-06-17 RX ADMIN — PHENYLEPHRINE HYDROCHLORIDE 1 DROP: 25 SOLUTION/ DROPS OPHTHALMIC at 08:06

## 2019-06-17 RX ADMIN — MIDAZOLAM HYDROCHLORIDE 3 MG: 1 INJECTION, SOLUTION INTRAMUSCULAR; INTRAVENOUS at 09:06

## 2019-06-17 RX ADMIN — TETRACAINE HYDROCHLORIDE 1 DROP: 5 SOLUTION OPHTHALMIC at 08:06

## 2019-06-17 RX ADMIN — MOXIFLOXACIN 1 DROP: 5 SOLUTION/ DROPS OPHTHALMIC at 10:06

## 2019-06-17 RX ADMIN — MOXIFLOXACIN 1 DROP: 5 SOLUTION/ DROPS OPHTHALMIC at 09:06

## 2019-06-17 RX ADMIN — MOXIFLOXACIN HYDROCHLORIDE 1 DROP: 5 SOLUTION/ DROPS OPHTHALMIC at 08:06

## 2019-06-17 RX ADMIN — TROPICAMIDE 1 DROP: 10 SOLUTION/ DROPS OPHTHALMIC at 08:06

## 2019-06-17 NOTE — OP NOTE
SURGEON:  Fei Dhillon M.D.    PREOPERATIVE DIAGNOSIS:    Nuclear Sclerotic Cataract Left Eye    POSTOPERATIVE DIAGNOSIS:    Nuclear Sclerotic Cataract Left Eye    PROCEDURES:    Phacoemulsification with  intraocular lens, Left eye (08091)  With Femtosecond LASER assist    DATE OF SURGERY: 06/17/2019    IMPLANT: [id number] 12.0    ANESTHESIA:  MAC with topical Lidocaine    COMPLICATIONS:  None    ESTIMATED BLOOD LOSS: None    SPECIMENS: None    INDICATIONS:    The patient has a history of painless progressive visual loss and  difficulty with activities of daily living secondary to cataract formation.  After a thorough discussion of the risks, benefits, and alternatives to cataract surgery, including, but not limited to, the rare risks of infection, retinal detachment, hemorrhage, need for additional surgery, loss of vision, and even loss of the eye, the patient voices understanding and desires to proceed.    DESCRIPTION OF PROCEDURE:    After verification of consent and marking of the operative eye, the patient was positioned under the femtosecond LASER. Topical anesthetic drops were administered. A surgical timeout was initiated with verification of patient identifiers and the laser surgical plan. The eye was docked securely and the laser portion of the cataract procedure was carried out without complication.  The patient was returned to the pre-operative area to await the intraocular surgical portion of the cataract procedure.  The patients IOL calculations were reviewed, and the lens selection confirmed.   After verification and marking of the proper eye in the preop holding area, the patient was brought to the operating room in supine position where the eye was prepped and draped in standard sterile fashion with 5% Betadine and a lid speculum placed in the eye.   Topical 4% Lidocaine was used in addition to the preoperative anesthesia and the procedure was begun by the creation of a paracentesis incision through  which viscoelastic was used to fill the anterior chamber.  Next, a keratome blade was used to create a triplanar temporal clear corneal incision and a cystotome and Utrata forceps used to fashion a continuous curvilinear capsulorrhexis.  Hydrodissection was carried out using the Charles hydrodissection cannula and the nucleus was found to be mobile.  Phacoemulsification of the nucleus was carried out using a quick chop technique, and all remaining epinuclear and cortical material was removed.  The eye was then reformed with Viscoelastic and the  intraocular lens was implanted into the capsular bag.  All remaining viscoelastics were removed from the eye and at the end of the case the pupil was round, the lens was well-centered within the capsular bag and all wounds were found to be water tight.  Drops of Vigamox and Pred Forte were instilled and a shield was placed over the eye. The patient will follow up with Dr. Dhillon in the morning.

## 2019-06-17 NOTE — ANESTHESIA POSTPROCEDURE EVALUATION
Anesthesia Post Evaluation    Patient: Eric Brown    Procedure(s) Performed: Procedure(s) (LRB):  EXTRACTION, CATARACT, WITH IOL INSERTION (Left)    Final Anesthesia Type: MAC  Patient location during evaluation: [facility]  Patient participation: Yes- Able to Participate  Level of consciousness: awake and alert and oriented  Post-procedure vital signs: reviewed and stable  Pain management: adequate  Airway patency: patent  PONV status at discharge: No PONV  Anesthetic complications: no      Cardiovascular status: hemodynamically stable  Respiratory status: unassisted, spontaneous ventilation and room air  Hydration status: euvolemic  Follow-up not needed.          Vitals Value Taken Time   /59 6/17/2019  8:14 AM   Temp 36.8 °C (98.2 °F) 6/17/2019  8:14 AM   Pulse 64 6/17/2019  8:14 AM   Resp 16 6/17/2019  8:14 AM   SpO2 96 % 6/17/2019  8:14 AM         No case tracking events are documented in the log.      Pain/Michael Score: No data recorded

## 2019-06-17 NOTE — DISCHARGE INSTRUCTIONS
Fei Dhillon MD  [facility]  Department of Ophthalmology      AFTER: Cataract Surgery:  Relax at home and DO NOT exert yourself for the rest of the day.  Plan to see Dr. Dhillon tomorrow at the eye clinic:   [facility] [facility] [address]  [place] [address]    Refer to attached eye drop instruction sheet     Precautions:  DO NOT rub your eye.  You may resume moderate activity the day after surgery.  Wear protective sunglasses during the day and a shield at night for 1(one) week.  If you have pain, redness and decreased vision, call Dr. Dhillon (or the on-call doctor after hours) @[phone].    Home Care Instructions for Eye Surgeries    1. ACTIVITY:  Limit your activity today. Relax at home and DO NOT exert yourself for the rest of the day. Increase activity gradually. You may return to work or school as directed by your physician.    2. DIET:  Drink plenty of fluids. Resume your normal diet unless instructed otherwise.    3. PAIN:  Expect a moderate amount of pain. If a prescription for pain is not sent home with you, you may take your commonly used pain reliever as directed. If this is not sufficient, call your physician. You may resume any other prescription medication unless otherwise directed by your physician.     Discuss any problem with your physician as soon as it arises. Do not Delay.      EMERGENCY- If you are unable to contact your physician, please go to the nearest Emergency Room.       Anesthesia: Monitored Anesthesia Care (MAC)    Anesthesia Safety  · Have an adult family member or friend drive you home after the procedure.  · For the first 24 hours after your surgery:  · Do not drive or use heavy equipment.  · Do not make important decisions or sign documents.  · Avoid alcohol.  · Have someone stay with you, if possible. They can watch for problems and help keep you safe.

## 2019-06-17 NOTE — PLAN OF CARE
Eric Brown has met all discharge criteria from Phase II. Vital Signs are stable, ambulating  without difficulty. Discharge instructions given, patient verbalized understanding. Discharged from facility via wheelchair in stable condition.

## 2019-06-17 NOTE — ANESTHESIA PREPROCEDURE EVALUATION
06/17/2019  Eric Brown is a 68 y.o., male.    Pre-op Assessment    I have reviewed the Patient Summary Reports.     I have reviewed the Nursing Notes.   I have reviewed the Medications.     Review of Systems  Anesthesia Hx:  No problems with previous Anesthesia  Denies Family Hx of Anesthesia complications.   Denies Personal Hx of Anesthesia complications.   Social:  Non-Smoker    Hematology/Oncology:  Hematology Normal       -- Cancer in past history:  Oncology Comments: Lung cancer     EENT/Dental:EENT/Dental Normal   Cardiovascular:   Exercise tolerance: good Hypertension    Pulmonary:  Pulmonary Normal    Renal/:   Chronic Renal Disease    Hepatic/GI:   Liver Disease,    Musculoskeletal:  Musculoskeletal Normal    Neurological:  Neurology Normal    Endocrine:  Endocrine Normal    Dermatological:  Skin Normal    Psych:  Psychiatric Normal           Physical Exam  General:  Well nourished    Airway/Jaw/Neck:  Airway Findings: Mouth Opening: Normal Tongue: Normal  General Airway Assessment: Adult  Mallampati: I  TM Distance: Normal, at least 6 cm  Jaw/Neck Findings:  Neck ROM: Normal ROM      Dental:  Dental Findings: In tact             Anesthesia Plan  Type of Anesthesia, risks & benefits discussed:  Anesthesia Type:  MAC  Patient's Preference:   Intra-op Monitoring Plan:   Intra-op Monitoring Plan Comments:   Post Op Pain Control Plan:   Post Op Pain Control Plan Comments:   Induction:    Beta Blocker:         Informed Consent: Patient understands risks and agrees with Anesthesia plan.  Questions answered. Anesthesia consent signed with patient.  ASA Score: 3     Day of Surgery Review of History & Physical:    H&P update referred to the surgeon.         Ready For Surgery From Anesthesia Perspective.

## 2019-06-18 ENCOUNTER — OFFICE VISIT (OUTPATIENT)
Dept: OPHTHALMOLOGY | Facility: CLINIC | Age: 69
End: 2019-06-18
Attending: OPHTHALMOLOGY
Payer: MEDICARE

## 2019-06-18 ENCOUNTER — OFFICE VISIT (OUTPATIENT)
Dept: RADIATION ONCOLOGY | Facility: CLINIC | Age: 69
End: 2019-06-18
Payer: MEDICARE

## 2019-06-18 ENCOUNTER — HOSPITAL ENCOUNTER (OUTPATIENT)
Dept: RADIATION THERAPY | Facility: HOSPITAL | Age: 69
Discharge: HOME | End: 2019-06-18
Attending: RADIOLOGY
Payer: MEDICARE

## 2019-06-18 VITALS
BODY MASS INDEX: 30.17 KG/M2 | SYSTOLIC BLOOD PRESSURE: 165 MMHG | HEART RATE: 66 BPM | DIASTOLIC BLOOD PRESSURE: 77 MMHG | WEIGHT: 203.69 LBS | HEIGHT: 69 IN | RESPIRATION RATE: 16 BRPM

## 2019-06-18 DIAGNOSIS — C22.1 CHOLANGIOCARCINOMA: Primary | ICD-10-CM

## 2019-06-18 DIAGNOSIS — Z98.890 POST-OPERATIVE STATE: Primary | ICD-10-CM

## 2019-06-18 DIAGNOSIS — H25.13 NUCLEAR SCLEROSIS, BILATERAL: ICD-10-CM

## 2019-06-18 PROCEDURE — 77290 THER RAD SIMULAJ FIELD CPLX: CPT | Mod: 26,,, | Performed by: RADIOLOGY

## 2019-06-18 PROCEDURE — 99999 PR PBB SHADOW E&M-EST. PATIENT-LVL II: CPT | Mod: PBBFAC,,, | Performed by: OPHTHALMOLOGY

## 2019-06-18 PROCEDURE — 77263 THER RADIOLOGY TX PLNG CPLX: CPT | Mod: ,,, | Performed by: RADIOLOGY

## 2019-06-18 PROCEDURE — 77290 THER RAD SIMULAJ FIELD CPLX: CPT | Mod: TC | Performed by: RADIOLOGY

## 2019-06-18 PROCEDURE — 77334 RADIATION TREATMENT AID(S): CPT | Mod: TC | Performed by: RADIOLOGY

## 2019-06-18 PROCEDURE — 77334 RADIATION TREATMENT AID(S): CPT | Mod: 26,,, | Performed by: RADIOLOGY

## 2019-06-18 PROCEDURE — 77263 PR  RADIATION THERAPY PLAN COMPLEX: ICD-10-PCS | Mod: ,,, | Performed by: RADIOLOGY

## 2019-06-18 PROCEDURE — 99024 POSTOP FOLLOW-UP VISIT: CPT | Mod: S$GLB,,, | Performed by: OPHTHALMOLOGY

## 2019-06-18 PROCEDURE — 99499 NO LOS: ICD-10-PCS | Mod: S$GLB,,, | Performed by: RADIOLOGY

## 2019-06-18 PROCEDURE — 99999 PR PBB SHADOW E&M-EST. PATIENT-LVL III: CPT | Mod: PBBFAC,,, | Performed by: RADIOLOGY

## 2019-06-18 PROCEDURE — 77334 PR  RADN TREATMENT AID(S) COMPLX: ICD-10-PCS | Mod: 26,,, | Performed by: RADIOLOGY

## 2019-06-18 PROCEDURE — 99499 UNLISTED E&M SERVICE: CPT | Mod: S$GLB,,, | Performed by: RADIOLOGY

## 2019-06-18 PROCEDURE — 99999 PR PBB SHADOW E&M-EST. PATIENT-LVL III: ICD-10-PCS | Mod: PBBFAC,,, | Performed by: RADIOLOGY

## 2019-06-18 PROCEDURE — 77290 PR  SET RADN THERAPY FIELD COMPLEX: ICD-10-PCS | Mod: 26,,, | Performed by: RADIOLOGY

## 2019-06-18 PROCEDURE — 77014 HC CT GUIDANCE RADIATION THERAPY FLDS PLACEMENT: CPT | Mod: TC | Performed by: RADIOLOGY

## 2019-06-18 PROCEDURE — 99024 PR POST-OP FOLLOW-UP VISIT: ICD-10-PCS | Mod: S$GLB,,, | Performed by: OPHTHALMOLOGY

## 2019-06-18 PROCEDURE — 99999 PR PBB SHADOW E&M-EST. PATIENT-LVL II: ICD-10-PCS | Mod: PBBFAC,,, | Performed by: OPHTHALMOLOGY

## 2019-06-18 NOTE — PROGRESS NOTES
HPI     1 Day post op CE with IOL OS (symfony) (06/17/2019).  Vision is doing well OU. No pain or discomfort OU. No flashes or floaters   OU.     Pt confirms using   PGB TID OU    Last edited by Annita Richardson on 6/18/2019  1:04 PM. (History)            Assessment /Plan     For exam results, see Encounter Report.    Post-operative state    Nuclear sclerosis, bilateral      Slit lamp exam:  L/L: nl  K: clear, wound sealed  AC: 1+ cell  Lens: IOL centered and stable    POD1 s/p Phaco/IOL  Appropriate precautions and post op medications reviewed.  Patient instructed to call or come in if symptoms of redness, decreased vision, or pain are experienced.

## 2019-06-18 NOTE — PROGRESS NOTES
Subjective:       Patient ID: Eric Brown is a 68 y.o. male.    Chief Complaint: Cholangiocarcinoma (consent; discuss xrt)    This patient presents to initiate treatment with radiotherapy.       Mr. Brown has a history of metastatic cholangiocarcinoma.  He initially presented in  6/2012 when he underwent a diistal pancreatectomy for a PNET and a liver resection for a T2N0M0 intrahepatic cholangiocarcinoma.  Unfortunately had a recurrence of the cholangiocarcinoma and underwent a repeat resection followed by adjuvant chemotherapy with cisplatin and gemcitabine - 6 cycles.  He did well until PET scan on 1/3/18 showed new disease and subsequent biopsy of soft tissue of peritoneum revealed metastatic cancer compatible with mets from cholangiocarcinoma  He completed 3 cycles of Cisplatin and Gemzar until 3/18.  He then started Protocol with  4/2018 - 12/2018. In Jan 2019, he developed progression of disease and was placed on PARP inhibitor trial Niraparib since 3/1/19 at [facility], last 5/22/19, stopped due to progression of disease.  His most recent imaging (MRI) at [facility]: 2.8 x 2.0 cm enhancing lesion at the superior aspect of the right acetabulum (series 8 image 31), increased in size. Another 0.8 cm enhancing lesion in the left ischium (series 8 image 36) was not seen previously. Another 1.5 cm lumbar metastasis image 362 series 16 has also increased in size.  The patient would like to receive palliative therapy.  Currently he is taking MS continue twice a day with oxycontin for breakthrough.      Review of Systems   Constitutional: Negative for activity change, appetite change, chills and fatigue.   Gastrointestinal: Negative for abdominal pain, constipation and diarrhea.   Musculoskeletal: Negative for back pain and joint swelling.       Objective:      Physical Exam   Constitutional: He appears well-developed and well-nourished. No distress.       Assessment:       1. Cholangiocarcinoma         Plan:       Will plan simulation. consent form signed.

## 2019-06-20 DIAGNOSIS — C22.1 INTRAHEPATIC CHOLANGIOCARCINOMA: ICD-10-CM

## 2019-06-21 RX ORDER — ONDANSETRON HYDROCHLORIDE 8 MG/1
8 TABLET, FILM COATED ORAL 4 TIMES DAILY PRN
Qty: 60 TABLET | Refills: 2 | Status: SHIPPED | OUTPATIENT
Start: 2019-06-21 | End: 2019-09-09 | Stop reason: SDUPTHER

## 2019-06-24 ENCOUNTER — PATIENT MESSAGE (OUTPATIENT)
Dept: OPHTHALMOLOGY | Facility: CLINIC | Age: 69
End: 2019-06-24

## 2019-06-25 ENCOUNTER — PATIENT MESSAGE (OUTPATIENT)
Dept: HEMATOLOGY/ONCOLOGY | Facility: CLINIC | Age: 69
End: 2019-06-25

## 2019-06-25 PROCEDURE — 77334 PR  RADN TREATMENT AID(S) COMPLX: ICD-10-PCS | Mod: 26,,, | Performed by: RADIOLOGY

## 2019-06-25 PROCEDURE — 77300 PR RADIATION THERAPY,DOSIMETRY PLAN: ICD-10-PCS | Mod: 26,,, | Performed by: RADIOLOGY

## 2019-06-25 PROCEDURE — 77334 RADIATION TREATMENT AID(S): CPT | Mod: TC | Performed by: RADIOLOGY

## 2019-06-25 PROCEDURE — 77417 THER RADIOLOGY PORT IMAGE(S): CPT | Performed by: RADIOLOGY

## 2019-06-25 PROCEDURE — 77300 RADIATION THERAPY DOSE PLAN: CPT | Mod: 26,,, | Performed by: RADIOLOGY

## 2019-06-25 PROCEDURE — 77387 GUIDANCE FOR RADJ TX DLVR: CPT | Mod: TC | Performed by: RADIOLOGY

## 2019-06-25 PROCEDURE — 77412 RADIATION TX DELIVERY LVL 3: CPT | Performed by: RADIOLOGY

## 2019-06-25 PROCEDURE — 77334 RADIATION TREATMENT AID(S): CPT | Mod: 26,,, | Performed by: RADIOLOGY

## 2019-06-25 PROCEDURE — 77295 PR 3D RADIOTHERAPY PLAN: ICD-10-PCS | Mod: 26,,, | Performed by: RADIOLOGY

## 2019-06-25 PROCEDURE — 77295 3-D RADIOTHERAPY PLAN: CPT | Mod: TC | Performed by: RADIOLOGY

## 2019-06-25 PROCEDURE — G6002 PR STEREOSCOPIC XRAY GUIDE FOR RADIATION TX DELIV: ICD-10-PCS | Mod: 26,,, | Performed by: RADIOLOGY

## 2019-06-25 PROCEDURE — 77300 RADIATION THERAPY DOSE PLAN: CPT | Mod: TC | Performed by: RADIOLOGY

## 2019-06-25 PROCEDURE — G6002 STEREOSCOPIC X-RAY GUIDANCE: HCPCS | Mod: 26,,, | Performed by: RADIOLOGY

## 2019-06-25 PROCEDURE — 77295 3-D RADIOTHERAPY PLAN: CPT | Mod: 26,,, | Performed by: RADIOLOGY

## 2019-06-26 ENCOUNTER — DOCUMENTATION ONLY (OUTPATIENT)
Dept: RADIATION ONCOLOGY | Facility: CLINIC | Age: 69
End: 2019-06-26

## 2019-06-26 ENCOUNTER — PATIENT MESSAGE (OUTPATIENT)
Dept: HEMATOLOGY/ONCOLOGY | Facility: CLINIC | Age: 69
End: 2019-06-26

## 2019-06-26 PROCEDURE — G6002 PR STEREOSCOPIC XRAY GUIDE FOR RADIATION TX DELIV: ICD-10-PCS | Mod: 26,,, | Performed by: RADIOLOGY

## 2019-06-26 PROCEDURE — 77387 GUIDANCE FOR RADJ TX DLVR: CPT | Mod: TC | Performed by: RADIOLOGY

## 2019-06-26 PROCEDURE — 77412 RADIATION TX DELIVERY LVL 3: CPT | Performed by: RADIOLOGY

## 2019-06-26 PROCEDURE — G6002 STEREOSCOPIC X-RAY GUIDANCE: HCPCS | Mod: 26,,, | Performed by: RADIOLOGY

## 2019-06-27 PROCEDURE — 77387 GUIDANCE FOR RADJ TX DLVR: CPT | Mod: TC | Performed by: RADIOLOGY

## 2019-06-27 PROCEDURE — 77412 RADIATION TX DELIVERY LVL 3: CPT | Performed by: RADIOLOGY

## 2019-06-27 PROCEDURE — G6002 PR STEREOSCOPIC XRAY GUIDE FOR RADIATION TX DELIV: ICD-10-PCS | Mod: 26,,, | Performed by: RADIOLOGY

## 2019-06-27 PROCEDURE — G6002 STEREOSCOPIC X-RAY GUIDANCE: HCPCS | Mod: 26,,, | Performed by: RADIOLOGY

## 2019-06-27 PROCEDURE — 77417 THER RADIOLOGY PORT IMAGE(S): CPT | Performed by: RADIOLOGY

## 2019-06-28 PROCEDURE — 77301 RADIOTHERAPY DOSE PLAN IMRT: CPT | Mod: 26,,, | Performed by: RADIOLOGY

## 2019-06-28 PROCEDURE — 77301 PR  INTEN MOD RADIOTHER PLAN W/DOSE VOL HIST: ICD-10-PCS | Mod: 26,,, | Performed by: RADIOLOGY

## 2019-06-28 PROCEDURE — G6002 STEREOSCOPIC X-RAY GUIDANCE: HCPCS | Mod: 26,,, | Performed by: RADIOLOGY

## 2019-06-28 PROCEDURE — 77301 RADIOTHERAPY DOSE PLAN IMRT: CPT | Mod: TC | Performed by: RADIOLOGY

## 2019-06-28 PROCEDURE — 77387 GUIDANCE FOR RADJ TX DLVR: CPT | Mod: TC | Performed by: RADIOLOGY

## 2019-06-28 PROCEDURE — G6002 PR STEREOSCOPIC XRAY GUIDE FOR RADIATION TX DELIV: ICD-10-PCS | Mod: 26,,, | Performed by: RADIOLOGY

## 2019-06-28 PROCEDURE — 77412 RADIATION TX DELIVERY LVL 3: CPT | Performed by: RADIOLOGY

## 2019-06-28 NOTE — PLAN OF CARE
Problem: Adult Inpatient Plan of Care  Goal: Plan of Care Review  Outcome: Ongoing (interventions implemented as appropriate)  Day 2 of outpatient XRT to the L Spine. C/o lower back pain rating 2 of 10 on 0-10 pain scale. Taking morphine.

## 2019-06-29 PROCEDURE — 77338 DESIGN MLC DEVICE FOR IMRT: CPT | Mod: 26,,, | Performed by: RADIOLOGY

## 2019-06-29 PROCEDURE — 77300 RADIATION THERAPY DOSE PLAN: CPT | Mod: TC | Performed by: RADIOLOGY

## 2019-06-29 PROCEDURE — 77338 DESIGN MLC DEVICE FOR IMRT: CPT | Mod: TC | Performed by: RADIOLOGY

## 2019-06-29 PROCEDURE — 77300 RADIATION THERAPY DOSE PLAN: CPT | Mod: 26,,, | Performed by: RADIOLOGY

## 2019-06-29 PROCEDURE — 77300 PR RADIATION THERAPY,DOSIMETRY PLAN: ICD-10-PCS | Mod: 26,,, | Performed by: RADIOLOGY

## 2019-06-29 PROCEDURE — 77338 PR  MLC IMRT DESIGN & CONSTRUCTION PER IMRT PLAN: ICD-10-PCS | Mod: 26,,, | Performed by: RADIOLOGY

## 2019-07-01 ENCOUNTER — PATIENT MESSAGE (OUTPATIENT)
Dept: HEMATOLOGY/ONCOLOGY | Facility: CLINIC | Age: 69
End: 2019-07-01

## 2019-07-01 ENCOUNTER — PATIENT MESSAGE (OUTPATIENT)
Dept: RADIATION ONCOLOGY | Facility: CLINIC | Age: 69
End: 2019-07-01

## 2019-07-01 ENCOUNTER — HOSPITAL ENCOUNTER (OUTPATIENT)
Dept: RADIATION THERAPY | Facility: HOSPITAL | Age: 69
Discharge: HOME | End: 2019-07-01
Attending: RADIOLOGY
Payer: MEDICARE

## 2019-07-01 ENCOUNTER — APPOINTMENT (OUTPATIENT)
Dept: RADIATION THERAPY | Facility: OTHER | Age: 69
End: 2019-07-01
Attending: RADIOLOGY
Payer: MEDICARE

## 2019-07-01 DIAGNOSIS — G89.3 NEOPLASM RELATED PAIN: ICD-10-CM

## 2019-07-01 DIAGNOSIS — C78.00 MALIGNANT NEOPLASM METASTATIC TO LUNG, UNSPECIFIED LATERALITY: Primary | ICD-10-CM

## 2019-07-01 PROCEDURE — 77387 GUIDANCE FOR RADJ TX DLVR: CPT | Mod: TC | Performed by: RADIOLOGY

## 2019-07-01 PROCEDURE — G6002 STEREOSCOPIC X-RAY GUIDANCE: HCPCS | Mod: 26,,, | Performed by: RADIOLOGY

## 2019-07-01 PROCEDURE — G6002 PR STEREOSCOPIC XRAY GUIDE FOR RADIATION TX DELIV: ICD-10-PCS | Mod: 26,,, | Performed by: RADIOLOGY

## 2019-07-01 PROCEDURE — 77412 RADIATION TX DELIVERY LVL 3: CPT | Performed by: RADIOLOGY

## 2019-07-01 RX ORDER — MORPHINE SULFATE 15 MG/1
15 TABLET, FILM COATED, EXTENDED RELEASE ORAL 2 TIMES DAILY
Qty: 60 TABLET | Refills: 0 | Status: SHIPPED | OUTPATIENT
Start: 2019-07-01 | End: 2019-07-26 | Stop reason: SDUPTHER

## 2019-07-01 RX ORDER — OXYCODONE AND ACETAMINOPHEN 5; 325 MG/1; MG/1
1 TABLET ORAL EVERY 6 HOURS PRN
Qty: 120 TABLET | Refills: 0 | Status: SHIPPED | OUTPATIENT
Start: 2019-07-01 | End: 2019-07-26 | Stop reason: SDUPTHER

## 2019-07-02 PROCEDURE — 77336 RADIATION PHYSICS CONSULT: CPT | Performed by: RADIOLOGY

## 2019-07-05 ENCOUNTER — DOCUMENTATION ONLY (OUTPATIENT)
Dept: RADIATION ONCOLOGY | Facility: CLINIC | Age: 69
End: 2019-07-05

## 2019-07-05 NOTE — PLAN OF CARE
Problem: Adult Inpatient Plan of Care  Goal: Plan of Care Review  Outcome: Outcome(s) achieved Date Met: 07/05/19  Pt. Completed outpt. xrt to lumbar spine on 7/1.

## 2019-07-06 NOTE — TELEPHONE ENCOUNTER
----- Message from Leandra Newman sent at 4/10/2019  2:20 PM CDT -----  Contact: Patient  Pt would like a call to discuss lab results,and also would like them sent to [facility]    Contact:: [phone]   NAUSEA/PAIN

## 2019-07-26 ENCOUNTER — OFFICE VISIT (OUTPATIENT)
Dept: OPTOMETRY | Facility: CLINIC | Age: 69
End: 2019-07-26
Payer: MEDICARE

## 2019-07-26 DIAGNOSIS — Z98.41 S/P BILATERAL CATARACT EXTRACTION: Primary | ICD-10-CM

## 2019-07-26 DIAGNOSIS — G89.3 NEOPLASM RELATED PAIN: ICD-10-CM

## 2019-07-26 DIAGNOSIS — C78.00 MALIGNANT NEOPLASM METASTATIC TO LUNG, UNSPECIFIED LATERALITY: ICD-10-CM

## 2019-07-26 DIAGNOSIS — Z98.42 S/P BILATERAL CATARACT EXTRACTION: Primary | ICD-10-CM

## 2019-07-26 PROCEDURE — 99999 PR PBB SHADOW E&M-EST. PATIENT-LVL II: CPT | Mod: PBBFAC,,, | Performed by: OPTOMETRIST

## 2019-07-26 PROCEDURE — 99024 PR POST-OP FOLLOW-UP VISIT: ICD-10-PCS | Mod: S$GLB,,, | Performed by: OPTOMETRIST

## 2019-07-26 PROCEDURE — 99999 PR PBB SHADOW E&M-EST. PATIENT-LVL II: ICD-10-PCS | Mod: PBBFAC,,, | Performed by: OPTOMETRIST

## 2019-07-26 PROCEDURE — 99024 POSTOP FOLLOW-UP VISIT: CPT | Mod: S$GLB,,, | Performed by: OPTOMETRIST

## 2019-07-26 RX ORDER — MORPHINE SULFATE 15 MG/1
15 TABLET, FILM COATED, EXTENDED RELEASE ORAL 2 TIMES DAILY
Qty: 60 TABLET | Refills: 0 | Status: SHIPPED | OUTPATIENT
Start: 2019-07-26 | End: 2019-08-06 | Stop reason: ALTCHOICE

## 2019-07-26 RX ORDER — OXYCODONE AND ACETAMINOPHEN 5; 325 MG/1; MG/1
1 TABLET ORAL EVERY 6 HOURS PRN
Qty: 120 TABLET | Refills: 0 | Status: SHIPPED | OUTPATIENT
Start: 2019-07-26 | End: 2019-08-07

## 2019-07-26 NOTE — PROGRESS NOTES
HPI     Pt here for one month post op    Pt states he is not seeing how he expected to see. Pt states his VA is   still blurry and small prints are hard to read.  06/03/2019 IMPLANT: [id number] 12.5 OD  06/17/2019 IMPLANT: [id number] 12.0 OS    Last edited by Millie Marquez on 7/26/2019 10:55 AM. (History)            Assessment /Plan     For exam results, see Encounter Report.    S/P bilateral cataract extraction  -     OCT- Retina            1.  Overall patient doing well.  Having difficulty reading.  Educated pt working distance will be arms length.  Recommend +1.50 OTC readers for closer working distance.  Need to use artificial tears at least 2x/day OU.  No CME OU.   Retina flat and intact OU--no holes, tears, breaks, or RDs.

## 2019-07-29 ENCOUNTER — PATIENT MESSAGE (OUTPATIENT)
Dept: HEMATOLOGY/ONCOLOGY | Facility: CLINIC | Age: 69
End: 2019-07-29

## 2019-08-04 ENCOUNTER — PATIENT MESSAGE (OUTPATIENT)
Dept: HEMATOLOGY/ONCOLOGY | Facility: CLINIC | Age: 69
End: 2019-08-04

## 2019-08-05 ENCOUNTER — PATIENT MESSAGE (OUTPATIENT)
Dept: HEMATOLOGY/ONCOLOGY | Facility: CLINIC | Age: 69
End: 2019-08-05

## 2019-08-05 ENCOUNTER — TELEPHONE (OUTPATIENT)
Dept: HEMATOLOGY/ONCOLOGY | Facility: CLINIC | Age: 69
End: 2019-08-05

## 2019-08-05 ENCOUNTER — HOSPITAL ENCOUNTER (OUTPATIENT)
Dept: CARDIOLOGY | Facility: CLINIC | Age: 69
Discharge: HOME | End: 2019-08-05
Attending: INTERNAL MEDICINE
Payer: MEDICARE

## 2019-08-05 VITALS
BODY MASS INDEX: 30.92 KG/M2 | WEIGHT: 204 LBS | HEART RATE: 60 BPM | DIASTOLIC BLOOD PRESSURE: 82 MMHG | SYSTOLIC BLOOD PRESSURE: 136 MMHG | HEIGHT: 68 IN

## 2019-08-05 DIAGNOSIS — C78.00 MALIGNANT NEOPLASM METASTATIC TO LUNG, UNSPECIFIED LATERALITY: ICD-10-CM

## 2019-08-05 DIAGNOSIS — C78.00 MALIGNANT NEOPLASM METASTATIC TO LUNG, UNSPECIFIED LATERALITY: Primary | ICD-10-CM

## 2019-08-05 LAB
ASCENDING AORTA: 3.72 CM
AV INDEX (PROSTH): 0.87
AV MEAN GRADIENT: 4 MMHG
AV PEAK GRADIENT: 9 MMHG
AV VALVE AREA: 2.61 CM2
AV VELOCITY RATIO: 0.82
BSA FOR ECHO PROCEDURE: 2.11 M2
CV ECHO LV RWT: 0.3 CM
DOP CALC AO PEAK VEL: 1.46 M/S
DOP CALC AO VTI: 27.69 CM
DOP CALC LVOT AREA: 3 CM2
DOP CALC LVOT DIAMETER: 1.96 CM
DOP CALC LVOT PEAK VEL: 1.19 M/S
DOP CALC LVOT STROKE VOLUME: 72.41 CM3
DOP CALCLVOT PEAK VEL VTI: 24.01 CM
E WAVE DECELERATION TIME: 429.32 MSEC
E/A RATIO: 2.7
E/E' RATIO: 15.73 M/S
ECHO LV POSTERIOR WALL: 0.73 CM (ref 0.6–1.1)
FRACTIONAL SHORTENING: 35 % (ref 28–44)
INTERVENTRICULAR SEPTUM: 0.8 CM (ref 0.6–1.1)
IVRT: 0.07 MSEC
LA MAJOR: 7.35 CM
LA MINOR: 7.32 CM
LA WIDTH: 5.02 CM
LEFT ATRIUM SIZE: 5.28 CM
LEFT ATRIUM VOLUME INDEX: 80.2 ML/M2
LEFT ATRIUM VOLUME: 165.26 CM3
LEFT INTERNAL DIMENSION IN SYSTOLE: 3.11 CM (ref 2.1–4)
LEFT VENTRICLE DIASTOLIC VOLUME INDEX: 51.84 ML/M2
LEFT VENTRICLE DIASTOLIC VOLUME: 106.86 ML
LEFT VENTRICLE MASS INDEX: 58 G/M2
LEFT VENTRICLE SYSTOLIC VOLUME INDEX: 18.5 ML/M2
LEFT VENTRICLE SYSTOLIC VOLUME: 38.15 ML
LEFT VENTRICULAR INTERNAL DIMENSION IN DIASTOLE: 4.79 CM (ref 3.5–6)
LEFT VENTRICULAR MASS: 119.19 G
LV LATERAL E/E' RATIO: 12.36 M/S
LV SEPTAL E/E' RATIO: 21.63 M/S
MV MEAN GRADIENT: 4 MMHG
MV PEAK A VEL: 0.64 M/S
MV PEAK E VEL: 1.73 M/S
MV STENOSIS PRESSURE HALF TIME: 141 MS
MV VALVE AREA P 1/2 METHOD: 1.56 CM2
PISA TR MAX VEL: 3.39 M/S
PULM VEIN S/D RATIO: 0.53
PV PEAK D VEL: 0.62 M/S
PV PEAK S VEL: 0.33 M/S
PV PEAK VELOCITY: 1.37 CM/S
RA MAJOR: 6.16 CM
RA PRESSURE: 8 MMHG
RA WIDTH: 4.9 CM
RIGHT VENTRICULAR END-DIASTOLIC DIMENSION: 4.33 CM
RV TISSUE DOPPLER FREE WALL SYSTOLIC VELOCITY 1 (APICAL 4 CHAMBER VIEW): 16.94 CM/S
SINUS: 2.76 CM
STJ: 2.47 CM
TDI LATERAL: 0.14 M/S
TDI SEPTAL: 0.08 M/S
TDI: 0.11 M/S
TR MAX PG: 46 MMHG
TRICUSPID ANNULAR PLANE SYSTOLIC EXCURSION: 2.34 CM
TV REST PULMONARY ARTERY PRESSURE: 54 MMHG

## 2019-08-05 PROCEDURE — 93306 TTE W/DOPPLER COMPLETE: CPT

## 2019-08-05 PROCEDURE — 93306 TTE W/DOPPLER COMPLETE: CPT | Mod: 26,,, | Performed by: INTERNAL MEDICINE

## 2019-08-05 PROCEDURE — 93306 TRANSTHORACIC ECHO (TTE) COMPLETE (CUPID ONLY): ICD-10-PCS | Mod: 26,,, | Performed by: INTERNAL MEDICINE

## 2019-08-05 NOTE — TELEPHONE ENCOUNTER
Left vm for patient to contact clinic, as his scheduled testing were canceled---  Advised him to proceed to ED for immediate evaluation.

## 2019-08-06 ENCOUNTER — OFFICE VISIT (OUTPATIENT)
Dept: HEMATOLOGY/ONCOLOGY | Facility: CLINIC | Age: 69
End: 2019-08-06
Payer: MEDICARE

## 2019-08-06 ENCOUNTER — LAB VISIT (OUTPATIENT)
Dept: LAB | Facility: OTHER | Age: 69
End: 2019-08-06
Attending: NURSE PRACTITIONER
Payer: MEDICARE

## 2019-08-06 VITALS
SYSTOLIC BLOOD PRESSURE: 137 MMHG | BODY MASS INDEX: 30.98 KG/M2 | RESPIRATION RATE: 20 BRPM | WEIGHT: 204.38 LBS | TEMPERATURE: 98 F | DIASTOLIC BLOOD PRESSURE: 71 MMHG | HEART RATE: 74 BPM | OXYGEN SATURATION: 95 % | HEIGHT: 68 IN

## 2019-08-06 DIAGNOSIS — R74.01 ELEVATED TRANSAMINASE LEVEL: ICD-10-CM

## 2019-08-06 DIAGNOSIS — C22.1 INTRAHEPATIC CHOLANGIOCARCINOMA: ICD-10-CM

## 2019-08-06 DIAGNOSIS — C78.00 MALIGNANT NEOPLASM METASTATIC TO LUNG, UNSPECIFIED LATERALITY: ICD-10-CM

## 2019-08-06 DIAGNOSIS — G89.3 NEOPLASM RELATED PAIN: ICD-10-CM

## 2019-08-06 DIAGNOSIS — C78.6 SECONDARY ADENOCARCINOMA OF RETROPERITONEUM: ICD-10-CM

## 2019-08-06 DIAGNOSIS — T45.1X5A ANTINEOPLASTIC CHEMOTHERAPY INDUCED ANEMIA: Primary | ICD-10-CM

## 2019-08-06 DIAGNOSIS — D64.81 ANTINEOPLASTIC CHEMOTHERAPY INDUCED ANEMIA: ICD-10-CM

## 2019-08-06 DIAGNOSIS — T45.1X5A ANTINEOPLASTIC CHEMOTHERAPY INDUCED ANEMIA: ICD-10-CM

## 2019-08-06 DIAGNOSIS — D64.81 ANTINEOPLASTIC CHEMOTHERAPY INDUCED ANEMIA: Primary | ICD-10-CM

## 2019-08-06 LAB
ABO + RH BLD: NORMAL
BLD GP AB SCN CELLS X3 SERPL QL: NORMAL

## 2019-08-06 PROCEDURE — 86850 RBC ANTIBODY SCREEN: CPT

## 2019-08-06 PROCEDURE — 99215 PR OFFICE/OUTPT VISIT, EST, LEVL V, 40-54 MIN: ICD-10-PCS | Mod: S$GLB,,, | Performed by: NURSE PRACTITIONER

## 2019-08-06 PROCEDURE — 99215 OFFICE O/P EST HI 40 MIN: CPT | Mod: S$GLB,,, | Performed by: NURSE PRACTITIONER

## 2019-08-06 PROCEDURE — 36415 COLL VENOUS BLD VENIPUNCTURE: CPT

## 2019-08-06 PROCEDURE — 99999 PR PBB SHADOW E&M-EST. PATIENT-LVL III: CPT | Mod: PBBFAC,,, | Performed by: NURSE PRACTITIONER

## 2019-08-06 PROCEDURE — 99999 PR PBB SHADOW E&M-EST. PATIENT-LVL III: ICD-10-PCS | Mod: PBBFAC,,, | Performed by: NURSE PRACTITIONER

## 2019-08-06 RX ORDER — DIPHENHYDRAMINE HCL 25 MG
25 CAPSULE ORAL
Status: CANCELLED | OUTPATIENT
Start: 2019-08-06

## 2019-08-06 RX ORDER — MORPHINE SULFATE 15 MG/1
15 TABLET, FILM COATED, EXTENDED RELEASE ORAL
Qty: 90 TABLET | Refills: 0 | Status: SHIPPED | OUTPATIENT
Start: 2019-08-06 | End: 2019-08-07

## 2019-08-06 RX ORDER — HYDROCODONE BITARTRATE AND ACETAMINOPHEN 500; 5 MG/1; MG/1
TABLET ORAL ONCE
Status: CANCELLED | OUTPATIENT
Start: 2019-08-06 | End: 2019-08-06

## 2019-08-06 RX ORDER — MORPHINE SULFATE 15 MG/1
15 TABLET, FILM COATED, EXTENDED RELEASE ORAL
COMMUNITY
Start: 2019-07-30 | End: 2019-08-06 | Stop reason: ALTCHOICE

## 2019-08-06 RX ORDER — ACETAMINOPHEN 325 MG/1
650 TABLET ORAL
Status: CANCELLED | OUTPATIENT
Start: 2019-08-06

## 2019-08-06 NOTE — PROGRESS NOTES
"Subjective:       Patient ID: Eric Brown is a 68 y.o. male.    URGENT CARE:     Chief Complaint: Intrahepatic cholangiocarcinoma    69 yo male with Intrahepatic cholangiocarcinoma, currently on trial at [facility].  -Here today for an urgent care visit with complaints of generalized weakness, SOB on exertion and swelling to lower extremities.   -Patient received C1 Avelumab, Utomilumab 2 weeks ago at [facility] (cycle q 2 weeks).   -He states that he had a reaction during the infusion but was re challanged without further issues.   -Fatigue and generalized weakness developed a few days post.   -He noticed swelling in legs- worse after flight to [place] 2 days after treatment.   -SOB on exertion.  -Tries to stay active.   -Appetite fair. No weight loss noted today.   -Taking MS Contin 15 mg BID without complete relief and needing breakthrough pain meds throughout the day. Taking percocet @6 a day. He would like to take MS Contin every 8 hours as opposed to increasing dose. Son mentions fentanyl may be a better option but patient does not want to switch at this time and states [facility] recommended MS Contin every 8 hrs to him at last visit.   -No CP, palpitations, nausea/vomiting. No bowel issues- takes miralax.     ONCOLOGIC HISTORY:  Mr. Brown is a 68 year old male who in 6/2012 underwent a distal pancreatectomy for a PNET and a liver resection for a T2N0M0 intrahepatic cholangiocarcinoma.  Unfortunately had a recurrence of the cholangiocarcinoma and underwent a repeat resection at [facility].  Completed adjuvant treatment with cisplatin and gemcitabine - 6 cycles.     He has been on surveillance.     He underwent PET scan on 1/3/18 at [facility] which reveals "Scattered subcentimeter pulmonary nodules, some new/larger than on 07/31/2017, nonspecific, possibly metastatic or inflammatory, to be followed.  Small metabolically active foci in the liver, at sites of previously noted subcentimeter hypervascular nodules on liver " "protocol CT dated 10/02/2017, concerning for metastases Metabolically active periportal and retroperitoneal adenopathy, larger than on 10/02/2017, consistent with metastatic disease. Spiculated soft tissue in the pancreatectomy bed region, with low-grade metabolic activity, most likely related to postsurgical change, but local recurrence cannot be entirely excluded, to be followed"  He underwent biopsy of soft tissue of peritoneum and pathology revealed metastatic cancer compatible with mets from cholangiocarcinoma. He has been recommended to start chemo with Cisplatin and gemzar and they are also evaluating him for trials.  He completed 3 cycles of Cisplatin and Gemzar until 3/18 and progressed.  He then went to [facility] and started Protocol with  4/2018 - 12/2018   He recently underwent MRI abdomen at [facility] on 1/28/19 which reveals "Stable to slightly increased size of metastatic disease in the liver remnant (2 lesions). Other arterial foci are nonspecific and may represent shunt phenomena. Focal enhancement in the pancreatic resection bed is stable compared to 11/07/2018 and nonspecific. Surgical bed fluid has resolved. Retroperitoneal lymphadenopathy has moderately increased"  CT chest on 1/29/19 reveals Compared to 11/07/2018, innumerable metastatic lung nodules have increased in size.  He was on a PARP inhibitor trial Niraparib since 3/1/19 at [facility]. CT scans in May from [facility] show progression         Per last note, review his scans (CT chest and MRI abdomen) from [facility] reveal "Stable liver metastases.  Stable metastatic retroperitoneal lymphadenopathy obstructing the right kidney. New and enlarging bone metastases. Increase in lung metastases"  He has been recommended a phase 1 trial with anti PD1/LAG 3       Review of Systems   Constitutional: Positive for fatigue. Negative for activity change, appetite change, fever and unexpected weight change.   HENT: Negative for mouth " sores, nosebleeds and sore throat.    Eyes: Positive for visual disturbance (cataracts).   Respiratory: Positive for shortness of breath. Negative for cough.    Cardiovascular: Positive for leg swelling. Negative for chest pain and palpitations.   Gastrointestinal: Negative for abdominal pain, constipation, diarrhea, nausea and vomiting.   Genitourinary: Negative for difficulty urinating and frequency.   Musculoskeletal: Negative for arthralgias and back pain.   Skin: Negative for rash.   Neurological: Negative for dizziness, numbness and headaches.   Hematological: Negative for adenopathy. Does not bruise/bleed easily.   Psychiatric/Behavioral: Positive for sleep disturbance (urinates frequently at night). Negative for confusion. The patient is not nervous/anxious.    All other systems reviewed and are negative.      PMFSH: all information reviewed and updated as relevant to today's visit  Objective:      Physical Exam   Constitutional: He is oriented to person, place, and time. He appears well-developed and well-nourished.   Presents with spouse and 2 sons (both physicians).   NAD; ambulates to exam table without difficulty and with ease.    HENT:   Mouth/Throat: No oropharyngeal exudate.   Eyes:   Pale conjunctivae.    Cardiovascular: Normal rate and normal heart sounds.   Pulmonary/Chest: Effort normal and breath sounds normal. He has no wheezes.   Abdominal: Soft. Bowel sounds are normal. There is no tenderness.   Musculoskeletal: He exhibits edema (trace-1+ bilateral LE edema-mainly to ankles.). He exhibits no tenderness.   Lymphadenopathy:     He has no cervical adenopathy.   Neurological: He is alert and oriented to person, place, and time. He displays normal reflexes. Coordination normal.   Skin: Skin is warm and dry. No rash noted.   Psychiatric: He has a normal mood and affect. Judgment and thought content normal.   Vitals reviewed.      LABS:  WBC   Date Value Ref Range Status   08/05/2019 8.92 3.90 -  12.70 K/uL Final     Hemoglobin   Date Value Ref Range Status   08/05/2019 8.6 (L) 14.0 - 18.0 g/dL Final     Hematocrit   Date Value Ref Range Status   08/05/2019 28.3 (L) 40.0 - 54.0 % Final     Platelets   Date Value Ref Range Status   08/05/2019 374 (H) 150 - 350 K/uL Final     Gran # (ANC)   Date Value Ref Range Status   08/05/2019 6.8 1.8 - 7.7 K/uL Final     Comment:     The ANC is based on a white cell differential from an   automated cell counter. It has not been microscopically   reviewed for the presence of abnormal cells. Clinical   correlation is required.         Chemistry        Component Value Date/Time     08/05/2019 1405    K 4.9 08/05/2019 1405     08/05/2019 1405    CO2 26 08/05/2019 1405    BUN 22 08/05/2019 1405    CREATININE 1.4 08/05/2019 1405     (H) 08/05/2019 1405        Component Value Date/Time    CALCIUM 9.4 08/05/2019 1405    ALKPHOS 149 (H) 08/05/2019 1405    AST 45 (H) 08/05/2019 1405    ALT 70 (H) 08/05/2019 1405    BILITOT 0.5 08/05/2019 1405    ESTGFRAFRICA 58.8 (A) 08/05/2019 1405    EGFRNONAA 50.9 (A) 08/05/2019 1405          Assessment:       1. Antineoplastic chemotherapy induced anemia    2. Neoplasm related pain    3. Intrahepatic cholangiocarcinoma    4. Malignant neoplasm metastatic to lung, unspecified laterality    5. Secondary adenocarcinoma of retroperitoneum    6. Elevated transaminase level        Plan:         67 yo male with Intrahepatic cholangiocarcinoma currently on trial at [facility] (Avelumab and Utomilumab every 2 weeks) presents to urgent care with symptomatic anemia.     -Discussed possible adverse effects of current regimen- see below.   -Labs reviewed. Anemia parameters noted. Will type and match today and transfuse 1 u PRBC's tomorrow. Of note, son will contact [facility] today to make sure ok for transfusion as he is on trial.   -Liver enzymes increased- will monitor.   -Albumin noted- May need nutrition consult eventually.   -Glucose elevated-  monitor.    -recheck labs next week as scheduled.   -In regards to neoplasm related pain- ok to take MS Contin 15 mg every 8 hrs while awake. Percocet for breakthrough. Bowel regimen discussed. Of note, [facility] stopped prednisone that was intitiated by Dr. Fuentes at palliative visit.   -Handicap tag         Adverse effects:                  Patient is in agreement with the proposed treatment plan. All questions were answered to the patient's satisfaction. Pt knows to call clinic for any new or worsening symptoms and if anything is needed before the next clinic visit.      CHAYO GarayP-C  Hematology & Oncology  [facility] [place] [address]  ph. [phone]  Fax. [phone]     I spent 60 minutes (face to face) with the patient, more than 50% was in counseling and coordination of care as detailed above.     Distress Screening Results: Psychosocial Distress screening score of Distress Score: 7 noted and reviewed. No intervention indicated.

## 2019-08-07 ENCOUNTER — INFUSION (OUTPATIENT)
Dept: INFUSION THERAPY | Facility: OTHER | Age: 69
End: 2019-08-07
Attending: OBSTETRICS & GYNECOLOGY
Payer: MEDICARE

## 2019-08-07 VITALS
HEART RATE: 63 BPM | OXYGEN SATURATION: 94 % | DIASTOLIC BLOOD PRESSURE: 71 MMHG | TEMPERATURE: 98 F | RESPIRATION RATE: 18 BRPM | SYSTOLIC BLOOD PRESSURE: 133 MMHG

## 2019-08-07 DIAGNOSIS — G89.3 NEOPLASM RELATED PAIN: Primary | ICD-10-CM

## 2019-08-07 DIAGNOSIS — D64.81 ANTINEOPLASTIC CHEMOTHERAPY INDUCED ANEMIA: ICD-10-CM

## 2019-08-07 DIAGNOSIS — T45.1X5A ANTINEOPLASTIC CHEMOTHERAPY INDUCED ANEMIA: ICD-10-CM

## 2019-08-07 LAB
BLD PROD TYP BPU: NORMAL
BLOOD UNIT EXPIRATION DATE: NORMAL
BLOOD UNIT TYPE CODE: 5100
BLOOD UNIT TYPE: NORMAL
CODING SYSTEM: NORMAL
DISPENSE STATUS: NORMAL
NUM UNITS TRANS PACKED RBC: NORMAL

## 2019-08-07 PROCEDURE — 25000003 PHARM REV CODE 250: Performed by: NURSE PRACTITIONER

## 2019-08-07 PROCEDURE — 27201040 HC RC 50 FILTER

## 2019-08-07 PROCEDURE — 86920 COMPATIBILITY TEST SPIN: CPT

## 2019-08-07 PROCEDURE — 36430 TRANSFUSION BLD/BLD COMPNT: CPT

## 2019-08-07 PROCEDURE — 63600175 PHARM REV CODE 636 W HCPCS: Performed by: NURSE PRACTITIONER

## 2019-08-07 PROCEDURE — P9038 RBC IRRADIATED: HCPCS

## 2019-08-07 RX ORDER — HYDROCODONE BITARTRATE AND ACETAMINOPHEN 500; 5 MG/1; MG/1
TABLET ORAL ONCE
Status: COMPLETED | OUTPATIENT
Start: 2019-08-07 | End: 2019-08-07

## 2019-08-07 RX ORDER — DIPHENHYDRAMINE HCL 25 MG
25 CAPSULE ORAL
Status: COMPLETED | OUTPATIENT
Start: 2019-08-07 | End: 2019-08-07

## 2019-08-07 RX ORDER — OXYCODONE HYDROCHLORIDE 5 MG/1
5 TABLET ORAL EVERY 6 HOURS PRN
Qty: 120 TABLET | Refills: 0 | Status: SHIPPED | OUTPATIENT
Start: 2019-08-07 | End: 2019-08-26 | Stop reason: SDUPTHER

## 2019-08-07 RX ORDER — ACETAMINOPHEN 325 MG/1
650 TABLET ORAL
Status: DISCONTINUED | OUTPATIENT
Start: 2019-08-07 | End: 2019-08-07 | Stop reason: HOSPADM

## 2019-08-07 RX ORDER — HYDROMORPHONE HYDROCHLORIDE 2 MG/1
2 TABLET ORAL EVERY 4 HOURS PRN
Qty: 90 TABLET | Refills: 0 | Status: SHIPPED | OUTPATIENT
Start: 2019-08-07 | End: 2019-09-08 | Stop reason: SDUPTHER

## 2019-08-07 RX ORDER — FENTANYL 25 UG/1
1 PATCH TRANSDERMAL
Qty: 10 PATCH | Refills: 0 | Status: SHIPPED | OUTPATIENT
Start: 2019-08-07 | End: 2019-08-15 | Stop reason: ALTCHOICE

## 2019-08-07 RX ADMIN — DIPHENHYDRAMINE HYDROCHLORIDE 25 MG: 25 CAPSULE ORAL at 09:08

## 2019-08-07 RX ADMIN — SODIUM CHLORIDE: 0.9 INJECTION, SOLUTION INTRAVENOUS at 09:08

## 2019-08-07 NOTE — PLAN OF CARE
Problem: Adult Inpatient Plan of Care  Goal: Patient-Specific Goal (Individualization)  Outcome: Ongoing (interventions implemented as appropriate)  1 unit PRBC transfusion complete. Pt tolerated well. VSS. NAD. IV to right forearm DC'd. AVS provided. Pt verbalized understanding of discharge instructions before leaving with wife.

## 2019-08-09 ENCOUNTER — PATIENT MESSAGE (OUTPATIENT)
Dept: HEMATOLOGY/ONCOLOGY | Facility: CLINIC | Age: 69
End: 2019-08-09

## 2019-08-15 DIAGNOSIS — G89.3 NEOPLASM RELATED PAIN: ICD-10-CM

## 2019-08-15 RX ORDER — FENTANYL 50 UG/1
1 PATCH TRANSDERMAL
Qty: 10 PATCH | Refills: 0 | Status: SHIPPED | OUTPATIENT
Start: 2019-08-15 | End: 2019-08-15

## 2019-08-15 RX ORDER — FENTANYL 50 UG/1
1 PATCH TRANSDERMAL
Qty: 10 PATCH | Refills: 0 | Status: SHIPPED | OUTPATIENT
Start: 2019-08-15 | End: 2019-08-22

## 2019-08-19 ENCOUNTER — OFFICE VISIT (OUTPATIENT)
Dept: UROLOGY | Facility: CLINIC | Age: 69
End: 2019-08-19
Payer: MEDICARE

## 2019-08-19 ENCOUNTER — TELEPHONE (OUTPATIENT)
Dept: UROLOGY | Facility: CLINIC | Age: 69
End: 2019-08-19

## 2019-08-19 VITALS
WEIGHT: 207.25 LBS | HEART RATE: 72 BPM | BODY MASS INDEX: 32.53 KG/M2 | HEIGHT: 67 IN | DIASTOLIC BLOOD PRESSURE: 71 MMHG | SYSTOLIC BLOOD PRESSURE: 115 MMHG

## 2019-08-19 DIAGNOSIS — N13.30 HYDRONEPHROSIS, UNSPECIFIED HYDRONEPHROSIS TYPE: Primary | ICD-10-CM

## 2019-08-19 DIAGNOSIS — D49.4 BLADDER TUMOR: ICD-10-CM

## 2019-08-19 PROCEDURE — 99213 OFFICE O/P EST LOW 20 MIN: CPT | Mod: S$GLB,,, | Performed by: UROLOGY

## 2019-08-19 PROCEDURE — 99213 PR OFFICE/OUTPT VISIT, EST, LEVL III, 20-29 MIN: ICD-10-PCS | Mod: S$GLB,,, | Performed by: UROLOGY

## 2019-08-19 PROCEDURE — 87086 URINE CULTURE/COLONY COUNT: CPT

## 2019-08-19 PROCEDURE — 99999 PR PBB SHADOW E&M-EST. PATIENT-LVL IV: CPT | Mod: PBBFAC,,, | Performed by: UROLOGY

## 2019-08-19 PROCEDURE — 99999 PR PBB SHADOW E&M-EST. PATIENT-LVL IV: ICD-10-PCS | Mod: PBBFAC,,, | Performed by: UROLOGY

## 2019-08-19 NOTE — LETTER
August 19, 2019      Pacheco Beyer MD  [address]  [facility] [address]           [facility] 4th [facility]  [address]  [facility] [address]  Phone: [phone]          Patient: Eric Brown   MR Number: [id number]   YOB: 1950   Date of Visit: 8/19/2019       Dear Dr. Pacheco Beyer:    Thank you for referring Eric Brown to me for evaluation. Attached you will find relevant portions of my assessment and plan of care.    If you have questions, please do not hesitate to call me. I look forward to following Eric Brown along with you.    Sincerely,    Rodger Noguera MD    Enclosure  CC:  No Recipients    If you would like to receive this communication electronically, please contact [email or web address] or [phone] to request more information on [employer] access.    For providers and/or their staff who would like to refer a patient to [facility], please contact us through our one-stop-shop provider referral line, [facility], at [phone].    If you feel you have received this communication in error or would no longer like to receive these types of communications, please e-mail [email or web address]

## 2019-08-19 NOTE — PROGRESS NOTES
CHIEF COMPLAINT:    Mr. Brown is a 68 y.o. male presenting with hydro.    PRESENTING ILLNESS:    Eric Brown is a 68 y.o. male with stage 4 cholangiocarcinoma of the liver.  Currently undergoing experimental chemo at [facility].  He had a CT scan showing R hydro due to what appears to be para aortic lymph node compression.     He underwent a R JJ stent on 11/27/18.  At that time, it was noted that he had a bladder tumor as well.  This has been discussed with him, his son, and the treating physician at [facility].  It's being observed.    Stent last exchanged 4/16/19.    No dysuria.  No hematuria.    REVIEW OF SYSTEMS:    Eric Brown denies headache, blurred vision, fever, nausea, vomiting, chills, abdominal pain, bleeding per rectum, cough, SOB, recent loss of consciousness, recent mental status changes, seizures, dizziness, or upper or lower extremity weakness.    BILL  1.   2.   3.   4.   5.       PATIENT HISTORY:    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    s/p resection, followed at [facility]    Kidney stone     Prediabetes     Vitreous floaters        Past Surgical History:   Procedure Laterality Date    BIOPSY, BLADDER N/A 11/27/2018    Performed by [facility] at [facility] OR 1ST FLR    CARDIAC SURGERY      CHOLECYSTECTOMY      5-2012    COLONOSCOPY N/A 11/24/2015    Performed by Emma Dhillon MD at [facility] ENDO (4TH FLR)    CYSTOSCOPY Right 4/16/2019    Performed by [facility] at [facility] OR 1ST FLR    CYSTOSCOPY  11/27/2018    Performed by [facility]era MD at [facility] OR 1ST FLR    EXTRACORPOREAL SHOCK WAVE LITHOTRIPSY  2007 and 2008    EXTRACTION,  CATARACT, WITH IOL INSERTION Left 2019    [facility] OR    EXTRACTION, CATARACT, WITH IOL INSERTION Right 6/3/2019    Per[facility] at [facility] OR    litho      LIVER RESECTION      Distal pancreatectomy    LIVER RESECTION  1-22-15    Hepatic cholangiocarcinoma    MITRAL VALVE SURGERY      PLACEMENT-STENT Right 2018    Performed by [facility] at [facility] OR 1ST FLR    PYELOGRAM, RETROGRADE Right 2019    Performed by [facility] at [facility] OR 1ST FLR    REMOVAL-PORT-A-CATH N/A 2016    Performed by [facility] at [facility] OR 2ND FLR    REPLACEMENT, STENT Right 2019    Performed by [facility] at [facility] OR 1ST FLR    URETEROSCOPY Right 2019    Performed by [facility] at [facility] OR 1ST FLR       Family History   Problem Relation Age of Onset    Hypertension Mother     Cataracts Father     Amblyopia Neg Hx     Blindness Neg Hx     Glaucoma Neg Hx     Macular degeneration Neg Hx     Retinal detachment Neg Hx     Strabismus Neg Hx     Melanoma Neg Hx        Social History     Socioeconomic History    Marital status:      Spouse name: Not on file    Number of children: Not on file    Years of education: Not on file    Highest education level: Not on file   Occupational History     Employer: [employer]   Social Needs    Financial resource strain: Not on file    Food insecurity:     Worry: Not on file     Inability: Not on file    Transportation needs:     Medical: Not on file     Non-medical: Not on file   Tobacco Use    Smoking status: Former Smoker     Last attempt to quit: 1985     Years since quittin.5    Smokeless tobacco: Never Used   Substance and Sexual Activity    Alcohol use: No     Comment: occasion    Drug use: No    Sexual activity: Not on file   Lifestyle    Physical activity:     Days per week: Not on file     Minutes per session: Not on file    Stress: Not on  file   Relationships    Social connections:     Talks on phone: Not on file     Gets together: Not on file     Attends Cheondoism service: Not on file     Active member of club or organization: Not on file     Attends meetings of clubs or organizations: Not on file     Relationship status: Not on file   Other Topics Concern    Not on file   Social History Narrative    Not on file       Allergies:  Avelumab; Iodine and iodide containing products; and Iohexol    Medications:    Current Outpatient Medications:     allopurinol (ZYLOPRIM) 100 MG tablet, TAKE 1 TABLET (100 MG TOTAL) BY MOUTH ONCE DAILY., Disp: 90 tablet, Rfl: 3    atorvastatin (LIPITOR) 10 MG tablet, TAKE 1 TABLET (10 MG TOTAL) BY MOUTH ONCE DAILY., Disp: 90 tablet, Rfl: 2    cholecalciferol, vitamin D3, (VITAMIN D3) 1,000 unit capsule, Take 5,000 mg by mouth., Disp: , Rfl:     felodipine (PLENDIL) 5 MG 24 hr tablet, Take 1 tablet (5 mg total) by mouth 2 (two) times daily., Disp: 60 tablet, Rfl: 11    fentaNYL (DURAGESIC) 50 mcg/hr, Place 1 patch onto the skin every 72 hours., Disp: 10 patch, Rfl: 0    HYDROmorphone (DILAUDID) 2 MG tablet, Take 1 tablet (2 mg total) by mouth every 4 (four) hours as needed for Pain (Take if Oxycodone does not work)., Disp: 90 tablet, Rfl: 0    metoprolol succinate (TOPROL-XL) 100 MG 24 hr tablet, Take 1 tablet (100 mg total) by mouth once daily., Disp: 60 tablet, Rfl: 6    MULTIVIT-IRON-MIN-FOLIC ACID 3,500-18-0.4 UNIT-MG-MG ORAL CHEW, Take by mouth., Disp: , Rfl:     multivit-min-FA-lycopen-lutein 0.4-300-250 mg-mcg-mcg Tab, Take by mouth., Disp: , Rfl:     omeprazole (PRILOSEC) 20 MG capsule, Take 1 capsule (20 mg total) by mouth once daily., Disp: 90 capsule, Rfl: 3    ondansetron (ZOFRAN) 8 MG tablet, TAKE 1 TABLET (8 MG TOTAL) BY MOUTH 4 (FOUR) TIMES DAILY AS NEEDED FOR NAUSEA., Disp: 60 tablet, Rfl: 2    oxyCODONE (ROXICODONE) 5 MG immediate release tablet, Take 1 tablet (5 mg total) by mouth every 6  (six) hours as needed for Pain., Disp: 120 tablet, Rfl: 0    predniSONE (DELTASONE) 5 MG tablet, Take 20 mg for 3 d 15 mg for 3 d 10 mg for 3 d 5 mg daily thereafter, Disp: 60 tablet, Rfl: 5    promethazine (PHENERGAN) 25 MG tablet, Take 25 mg by mouth every 6 (six) hours as needed., Disp: , Rfl: 7    tamsulosin (FLOMAX) 0.4 mg Cap, Take 1 capsule (0.4 mg total) by mouth once daily., Disp: 60 capsule, Rfl: 6    triamcinolone acetonide 0.1% (KENALOG) 0.1 % cream, AAA bid, Disp: , Rfl:     PHYSICAL EXAMINATION:    The patient generally appears in good health, is appropriately interactive, and is in no apparent distress.     Eyes: anicteric sclerae, moist conjunctivae; no lid-lag; PERRLA     HENT: Atraumatic; oropharynx clear with moist mucous membranes and no mucosal ulcerations;normal hard and soft palate.  No evidence of lymphadenopathy.    Neck: Trachea midline.  No thyromegaly.    Musculoskeletal: No abnormal gait.    Skin: No lesions.    Mental: Cooperative with normal affect.  Is oriented to time, place, and person.    Neuro: Grossly intact.    Chest: Normal inspiratory effort.   No accessory muscles.  No audible wheezes.  Respirations symmetric on inspiration and expiration.    Heart: Regular rhythm.      Abdomen:  Soft, non-tender. No masses or organomegaly. Bladder is not palpable. No evidence of flank discomfort. No evidence of inguinal hernia.    Genitourinary: The penis has no evidence of plaques or induration. The urethral meatus is normal. The testes, epididymides, and cord structures are normal in size and contour bilaterally. The scrotum is normal in size and contour.      Extremities: No clubbing, cyanosis, or edema      LABS:    UA dipped positive for leukocytes today  Lab Results   Component Value Date    PSA 2.4 02/01/2018    PSA 2.1 02/02/2017    PSA 1.8 11/14/2015       IMPRESSION:    Encounter Diagnoses   Name Primary?    Hydronephrosis, unspecified hydronephrosis type Yes    Bladder tumor           PLAN:    1. Discussed that he appears to have R hydro due to external compression.  Will plan for JJ stent exchange.  I  have explained the risk, benefits, and alternatives of the procedure in detail. The patient voices understanding and all questions have been answered. The patient agrees to proceed as planned.   2. Will send his urine for culture.  3. Continue to observe the bladder tumor.     Copy to:

## 2019-08-19 NOTE — H&P (VIEW-ONLY)
CHIEF COMPLAINT:    Mr. Brown is a 68 y.o. male presenting with hydro.    PRESENTING ILLNESS:    Eric Brown is a 68 y.o. male with stage 4 cholangiocarcinoma of the liver.  Currently undergoing experimental chemo at [facility].  He had a CT scan showing R hydro due to what appears to be para aortic lymph node compression.     He underwent a R JJ stent on 11/27/18.  At that time, it was noted that he had a bladder tumor as well.  This has been discussed with him, his son, and the treating physician at [facility].  It's being observed.    Stent last exchanged 4/16/19.    No dysuria.  No hematuria.    REVIEW OF SYSTEMS:    Eric Brown denies headache, blurred vision, fever, nausea, vomiting, chills, abdominal pain, bleeding per rectum, cough, SOB, recent loss of consciousness, recent mental status changes, seizures, dizziness, or upper or lower extremity weakness.    BILL  1.   2.   3.   4.   5.       PATIENT HISTORY:    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    s/p resection, followed at [facility]    Kidney stone     Prediabetes     Vitreous floaters        Past Surgical History:   Procedure Laterality Date    BIOPSY, BLADDER N/A 11/27/2018    Per[facility] at [facility] OR 1ST FLR    CARDIAC SURGERY      CHOLECYSTECTOMY      5-2012    COLONOSCOPY N/A 11/24/2015    Performed by Emma Dhillon MD at [facility] ENDO (4TH FLR)    CYSTOSCOPY Right 4/16/2019    Performed by Rodger Nog[facility]era MD at [facility] OR 1ST FLR    CYSTOSCOPY  11/27/2018    Performed by [facility] MD at [facility] OR 1ST FLR    EXTRACORPOREAL SHOCK WAVE LITHOTRIPSY  2007 and 2008    EXTRACTION,  CATARACT, WITH IOL INSERTION Left 2019    Per[facility] by [facility] OR    EXTRACTION, CATARACT, WITH IOL INSERTION Right 6/3/2019    Performed by Fei [facility]hillon MD at [facility] OR    litho      LIVER RESECTION      Distal pancreatectomy    LIVER RESECTION  1-22-15    Hepatic cholangiocarcinoma    MITRAL VALVE SURGERY      PLACEMENT-STENT Right 2018    Performed by [facility] MD at [facility] OR 1ST FLR    PYELOGRAM, RETROGRADE Right 2019    Performed by Rod[facility] N[facility]era MD at [facility] OR 1ST FLR    REMOVAL-PORT-A-CATH N/A 2016    Performed by [facility] Diagnostic Provider at [facility] OR 2ND FLR    REPLACEMENT, STENT Right 2019    Performed by Rodger Nog[facility]era MD at [facility] OR 1ST FLR    URETEROSCOPY Right 2019    Performed by Rodger Nog[facility]era MD at [facility] OR 1ST FLR       Family History   Problem Relation Age of Onset    Hypertension Mother     Cataracts Father     Amblyopia Neg Hx     Blindness Neg Hx     Glaucoma Neg Hx     Macular degeneration Neg Hx     Retinal detachment Neg Hx     Strabismus Neg Hx     Melanoma Neg Hx        Social History     Socioeconomic History    Marital status:      Spouse name: Not on file    Number of children: Not on file    Years of education: Not on file    Highest education level: Not on file   Occupational History     Employer: [employer]   Social Needs    Financial resource strain: Not on file    Food insecurity:     Worry: Not on file     Inability: Not on file    Transportation needs:     Medical: Not on file     Non-medical: Not on file   Tobacco Use    Smoking status: Former Smoker     Last attempt to quit: 1985     Years since quittin.5    Smokeless tobacco: Never Used   Substance and Sexual Activity    Alcohol use: No     Comment: occasion    Drug use: No    Sexual activity: Not on file   Lifestyle    Physical activity:     Days per week: Not on file     Minutes per session: Not on file    Stress: Not on  file   Relationships    Social connections:     Talks on phone: Not on file     Gets together: Not on file     Attends Holiness service: Not on file     Active member of club or organization: Not on file     Attends meetings of clubs or organizations: Not on file     Relationship status: Not on file   Other Topics Concern    Not on file   Social History Narrative    Not on file       Allergies:  Avelumab; Iodine and iodide containing products; and Iohexol    Medications:    Current Outpatient Medications:     allopurinol (ZYLOPRIM) 100 MG tablet, TAKE 1 TABLET (100 MG TOTAL) BY MOUTH ONCE DAILY., Disp: 90 tablet, Rfl: 3    atorvastatin (LIPITOR) 10 MG tablet, TAKE 1 TABLET (10 MG TOTAL) BY MOUTH ONCE DAILY., Disp: 90 tablet, Rfl: 2    cholecalciferol, vitamin D3, (VITAMIN D3) 1,000 unit capsule, Take 5,000 mg by mouth., Disp: , Rfl:     felodipine (PLENDIL) 5 MG 24 hr tablet, Take 1 tablet (5 mg total) by mouth 2 (two) times daily., Disp: 60 tablet, Rfl: 11    fentaNYL (DURAGESIC) 50 mcg/hr, Place 1 patch onto the skin every 72 hours., Disp: 10 patch, Rfl: 0    HYDROmorphone (DILAUDID) 2 MG tablet, Take 1 tablet (2 mg total) by mouth every 4 (four) hours as needed for Pain (Take if Oxycodone does not work)., Disp: 90 tablet, Rfl: 0    metoprolol succinate (TOPROL-XL) 100 MG 24 hr tablet, Take 1 tablet (100 mg total) by mouth once daily., Disp: 60 tablet, Rfl: 6    MULTIVIT-IRON-MIN-FOLIC ACID 3,500-18-0.4 UNIT-MG-MG ORAL CHEW, Take by mouth., Disp: , Rfl:     multivit-min-FA-lycopen-lutein 0.4-300-250 mg-mcg-mcg Tab, Take by mouth., Disp: , Rfl:     omeprazole (PRILOSEC) 20 MG capsule, Take 1 capsule (20 mg total) by mouth once daily., Disp: 90 capsule, Rfl: 3    ondansetron (ZOFRAN) 8 MG tablet, TAKE 1 TABLET (8 MG TOTAL) BY MOUTH 4 (FOUR) TIMES DAILY AS NEEDED FOR NAUSEA., Disp: 60 tablet, Rfl: 2    oxyCODONE (ROXICODONE) 5 MG immediate release tablet, Take 1 tablet (5 mg total) by mouth every 6  (six) hours as needed for Pain., Disp: 120 tablet, Rfl: 0    predniSONE (DELTASONE) 5 MG tablet, Take 20 mg for 3 d 15 mg for 3 d 10 mg for 3 d 5 mg daily thereafter, Disp: 60 tablet, Rfl: 5    promethazine (PHENERGAN) 25 MG tablet, Take 25 mg by mouth every 6 (six) hours as needed., Disp: , Rfl: 7    tamsulosin (FLOMAX) 0.4 mg Cap, Take 1 capsule (0.4 mg total) by mouth once daily., Disp: 60 capsule, Rfl: 6    triamcinolone acetonide 0.1% (KENALOG) 0.1 % cream, AAA bid, Disp: , Rfl:     PHYSICAL EXAMINATION:    The patient generally appears in good health, is appropriately interactive, and is in no apparent distress.     Eyes: anicteric sclerae, moist conjunctivae; no lid-lag; PERRLA     HENT: Atraumatic; oropharynx clear with moist mucous membranes and no mucosal ulcerations;normal hard and soft palate.  No evidence of lymphadenopathy.    Neck: Trachea midline.  No thyromegaly.    Musculoskeletal: No abnormal gait.    Skin: No lesions.    Mental: Cooperative with normal affect.  Is oriented to time, place, and person.    Neuro: Grossly intact.    Chest: Normal inspiratory effort.   No accessory muscles.  No audible wheezes.  Respirations symmetric on inspiration and expiration.    Heart: Regular rhythm.      Abdomen:  Soft, non-tender. No masses or organomegaly. Bladder is not palpable. No evidence of flank discomfort. No evidence of inguinal hernia.    Genitourinary: The penis has no evidence of plaques or induration. The urethral meatus is normal. The testes, epididymides, and cord structures are normal in size and contour bilaterally. The scrotum is normal in size and contour.      Extremities: No clubbing, cyanosis, or edema      LABS:    UA dipped positive for leukocytes today  Lab Results   Component Value Date    PSA 2.4 02/01/2018    PSA 2.1 02/02/2017    PSA 1.8 11/14/2015       IMPRESSION:    Encounter Diagnoses   Name Primary?    Hydronephrosis, unspecified hydronephrosis type Yes    Bladder tumor           PLAN:    1. Discussed that he appears to have R hydro due to external compression.  Will plan for JJ stent exchange.  I  have explained the risk, benefits, and alternatives of the procedure in detail. The patient voices understanding and all questions have been answered. The patient agrees to proceed as planned.   2. Will send his urine for culture.  3. Continue to observe the bladder tumor.     Copy to:

## 2019-08-20 LAB — BACTERIA UR CULT: NORMAL

## 2019-08-22 DIAGNOSIS — G89.3 NEOPLASM RELATED PAIN: Primary | ICD-10-CM

## 2019-08-22 RX ORDER — FENTANYL 100 UG/H
1 PATCH TRANSDERMAL
Qty: 10 PATCH | Refills: 0 | Status: SHIPPED | OUTPATIENT
Start: 2019-08-22 | End: 2019-08-26 | Stop reason: SDUPTHER

## 2019-08-25 ENCOUNTER — PATIENT MESSAGE (OUTPATIENT)
Dept: HEMATOLOGY/ONCOLOGY | Facility: CLINIC | Age: 69
End: 2019-08-25

## 2019-08-26 ENCOUNTER — PATIENT MESSAGE (OUTPATIENT)
Dept: HEMATOLOGY/ONCOLOGY | Facility: CLINIC | Age: 69
End: 2019-08-26

## 2019-08-26 ENCOUNTER — OFFICE VISIT (OUTPATIENT)
Dept: HEMATOLOGY/ONCOLOGY | Facility: CLINIC | Age: 69
End: 2019-08-26
Payer: MEDICARE

## 2019-08-26 ENCOUNTER — HOSPITAL ENCOUNTER (OUTPATIENT)
Dept: RADIOLOGY | Facility: HOSPITAL | Age: 69
Discharge: HOME | End: 2019-08-26
Attending: INTERNAL MEDICINE
Payer: MEDICARE

## 2019-08-26 VITALS
WEIGHT: 212.5 LBS | HEIGHT: 68 IN | OXYGEN SATURATION: 86 % | HEART RATE: 60 BPM | TEMPERATURE: 98 F | DIASTOLIC BLOOD PRESSURE: 56 MMHG | RESPIRATION RATE: 18 BRPM | BODY MASS INDEX: 32.21 KG/M2 | SYSTOLIC BLOOD PRESSURE: 108 MMHG

## 2019-08-26 DIAGNOSIS — C78.00 MALIGNANT NEOPLASM METASTATIC TO LUNG, UNSPECIFIED LATERALITY: Primary | ICD-10-CM

## 2019-08-26 DIAGNOSIS — C78.00 MALIGNANT NEOPLASM METASTATIC TO LUNG, UNSPECIFIED LATERALITY: ICD-10-CM

## 2019-08-26 DIAGNOSIS — D64.81 ANTINEOPLASTIC CHEMOTHERAPY INDUCED ANEMIA: ICD-10-CM

## 2019-08-26 DIAGNOSIS — R06.02 SHORTNESS OF BREATH: Primary | ICD-10-CM

## 2019-08-26 DIAGNOSIS — R09.02 HYPOXIA: ICD-10-CM

## 2019-08-26 DIAGNOSIS — G89.3 NEOPLASM RELATED PAIN: ICD-10-CM

## 2019-08-26 DIAGNOSIS — T45.1X5A ANTINEOPLASTIC CHEMOTHERAPY INDUCED ANEMIA: ICD-10-CM

## 2019-08-26 DIAGNOSIS — C78.6 SECONDARY ADENOCARCINOMA OF RETROPERITONEUM: ICD-10-CM

## 2019-08-26 DIAGNOSIS — R06.02 SHORTNESS OF BREATH: ICD-10-CM

## 2019-08-26 DIAGNOSIS — G89.3 NEOPLASM RELATED PAIN: Primary | ICD-10-CM

## 2019-08-26 DIAGNOSIS — C22.1 INTRAHEPATIC CHOLANGIOCARCINOMA: Primary | ICD-10-CM

## 2019-08-26 PROCEDURE — 99999 PR PBB SHADOW E&M-EST. PATIENT-LVL IV: CPT | Mod: PBBFAC,,, | Performed by: INTERNAL MEDICINE

## 2019-08-26 PROCEDURE — 71046 X-RAY EXAM CHEST 2 VIEWS: CPT | Mod: TC

## 2019-08-26 PROCEDURE — 71046 XR CHEST PA AND LATERAL: ICD-10-PCS | Mod: 26,,, | Performed by: RADIOLOGY

## 2019-08-26 PROCEDURE — 99215 OFFICE O/P EST HI 40 MIN: CPT | Mod: S$GLB,,, | Performed by: INTERNAL MEDICINE

## 2019-08-26 PROCEDURE — 99999 PR PBB SHADOW E&M-EST. PATIENT-LVL IV: ICD-10-PCS | Mod: PBBFAC,,, | Performed by: INTERNAL MEDICINE

## 2019-08-26 PROCEDURE — 99215 PR OFFICE/OUTPT VISIT, EST, LEVL V, 40-54 MIN: ICD-10-PCS | Mod: S$GLB,,, | Performed by: INTERNAL MEDICINE

## 2019-08-26 PROCEDURE — 71046 X-RAY EXAM CHEST 2 VIEWS: CPT | Mod: 26,,, | Performed by: RADIOLOGY

## 2019-08-26 RX ORDER — OXYCODONE HYDROCHLORIDE 10 MG/1
10 TABLET ORAL EVERY 4 HOURS PRN
Qty: 120 TABLET | Refills: 0 | Status: SHIPPED | OUTPATIENT
Start: 2019-08-26 | End: 2019-09-08 | Stop reason: SDUPTHER

## 2019-08-26 RX ORDER — FENTANYL 100 UG/H
1 PATCH TRANSDERMAL
Qty: 15 PATCH | Refills: 0 | Status: SHIPPED | OUTPATIENT
Start: 2019-08-26 | End: 2019-10-03 | Stop reason: ALTCHOICE

## 2019-08-26 RX ORDER — OXYCODONE HYDROCHLORIDE 5 MG/1
10 TABLET ORAL EVERY 6 HOURS PRN
Qty: 120 TABLET | Refills: 0 | Status: SHIPPED | OUTPATIENT
Start: 2019-08-26 | End: 2019-08-26

## 2019-08-26 RX ORDER — HYDROCODONE BITARTRATE AND ACETAMINOPHEN 500; 5 MG/1; MG/1
TABLET ORAL ONCE
Status: CANCELLED | OUTPATIENT
Start: 2019-08-26 | End: 2019-08-26

## 2019-08-26 RX ORDER — ACETAMINOPHEN 325 MG/1
650 TABLET ORAL
Status: CANCELLED | OUTPATIENT
Start: 2019-08-26

## 2019-08-26 NOTE — PROGRESS NOTES
Subjective:       Patient ID: Eric Brown is a 68 y.o. male.    Chief Complaint: Intrahepatic cholangiocarcinoma; 6/10 r hip/abdominal/lower back pain; Constipation; bilateral lower ext swelling; and Fatigue    Oncology History   1. Intrahepatic cholangiocarcinoma diagnosed on 05/23/2012 after undergoing a liver biopsy. At this time, the patient was also diagnosed with a pancreatic neuroendocrine tumor.  2. The patient is status post a robotic bisegmentectomy of the liver, cholecystectomy and distal pancreatectomy in [facility] on 05/28/2012. Diag as cholangio and PNET  3. The patient presents to [facility] following his surgery and was seen in 06/2012. He was followed on observation.  4. Imaging in 01/2015 revealed a new hepatic lesion involving segment 7 of   the liver.  5. The patient is status post a laparoscopic partial right hepatectomy with resection of the segment 7 lesion and diaphragmatic resection on 01/22/2015. Pathology was consistent with ubifxjigve-hp-bhofhq differentiated adenocarcinoma consistent with intrahepatic cholangiocarcinoma.  6. The patient underwent post resection chemotherapy with gemcitabine and cisplatin for 12 cycles. His last dose was on 06/25/2015.  F ONE: IDH1, FANCA  7. 3/2017- present developed pulmonary nodules and currently on observation.   8. [place] 1/2018 - 4/2018   9. Protocol with  4/2018 - 12/2018     Protocol: 5820-6414 Xvn095 combination, Part II, Arm A  Mechanism of action: Ttr072 (anti-PD-1) + [id number] (anti-LAG-3)  Cohort 2: Xjp851 3 mg/kg + Mbj492 1.0 mg/kg, IV, Q2W in 28-day cycle    Protocol [id number] Avelumab, utomilumab, and [id number] 7/18/19 -     [facility] 3/12/18  Genomic Alterations Identified  STK11 loss  IDH1 R132C  PBRM1 N458fs*5  Additional Findings  Microsatellite status MS-Stable  Tumor Mutation [place] TMB-Low; 4 Muts/Mb         Cholangiocarcinoma of biliary tract   4/28/2015 Initial Diagnosis   Cholangiocarcinoma of biliary  tract      12/19/2018 - Other   Referred to Phase I: Dr. Romie Cunningham      2/28/2019 - 5/22/2019 Chemotherapy/Biotherapy/Investigational   2017-0682 Niraparib (PARP inhibitor) PK phase 300 mg po X 1 dose capsule Day 1, then 300 mg po X 1 dose tablet Day 15, then maintenance extension 300 mg po daily (capsules)  : Michelle Butt  AE:    2.6.19 PK Phase study  Total cycles = 3      5/21/2019 Progression   CT Chest revealed interval enlargement of pulmonary metastases and paraesophageal prisca metastasis. New and enlarging bone metastases.   RECIST 29% increase      7/18/2019 Chemotherapy/Biotherapy/Investigational (Autogenerated)   Treatment Plan   2017-0014 Arm C: Avelumab, Utomilumab and [id number]    Chemotherapy summary: INV-(2017-0014) avelumab ([id number]) 872 mg in sodium chloride 0.9% (NON-PVC) 250 mL IVPB, intravenous, 1 of 5 cycles  INV-(2017-0014) utomilumab ([id number]) 20 mg in sodium chloride 0.9% (NON-PVC) 100 mL IVPB, intravenous, 1 of 5 cycles  INV-(2017-0014) [id number] 26 mg in sodium chloride 0.9% (NON-PVC) 50 mL IVPB, intravenous, 1 of 5 cycles    Treatment Dates: 7/18/2019 to 11/21/2019 (Planned)   Line of Treatment: Treatment of first Relapse or Greater     HPI Current Treatment: Protocol 2017-0014 Avelumab, utomilumab, and [id number].  He is scheduled to go to [facility] this week on 8/28/19. He is Fentanyl 100 mcg since 8/22/19 and is on Oxycodone sometimes 8/day on Sunday. He is also taking Dilaudid 2 mg once a day. He is having small bowel movements, last one was last night. He also has shortness of breath. His ECOG PS is 2.      Review of Systems   Constitutional: Positive for fatigue. Negative for appetite change and unexpected weight change.   HENT: Negative for mouth sores.    Eyes: Negative for visual disturbance.   Respiratory: Positive for shortness of breath. Negative for cough.    Cardiovascular: Negative for chest pain.   Gastrointestinal: Positive for abdominal  pain and constipation. Negative for diarrhea.   Genitourinary: Negative for frequency.   Musculoskeletal: Negative for back pain.   Skin: Negative for rash.   Neurological: Negative for headaches.   Hematological: Negative for adenopathy.   Psychiatric/Behavioral: The patient is not nervous/anxious.    All other systems reviewed and are negative.      PMFSH: all information reviewed and updated as relevant to today's visit  Objective:      Physical Exam   Constitutional: He is oriented to person, place, and time. He appears lethargic. He appears cachectic. He is cooperative.   Generalized anasarca+   HENT:   Mouth/Throat: No oropharyngeal exudate.   Cardiovascular: Normal rate and normal heart sounds.   Pulmonary/Chest: Effort normal and breath sounds normal. He has no wheezes.   Abdominal: Soft. Bowel sounds are normal. There is no tenderness.   Musculoskeletal: He exhibits no edema or tenderness.   Lymphadenopathy:     He has no cervical adenopathy.   Neurological: He is oriented to person, place, and time. He appears lethargic. Coordination normal.   Skin: Skin is warm and dry. No rash noted.   Psychiatric: He has a normal mood and affect. Judgment and thought content normal.   Vitals reviewed.        LABS:  WBC   Date Value Ref Range Status   08/26/2019 10.76 3.90 - 12.70 K/uL Final     Hemoglobin   Date Value Ref Range Status   08/26/2019 8.9 (L) 14.0 - 18.0 g/dL Final     Hematocrit   Date Value Ref Range Status   08/26/2019 30.1 (L) 40.0 - 54.0 % Final     Platelets   Date Value Ref Range Status   08/26/2019 409 (H) 150 - 350 K/uL Final     Gran # (ANC)   Date Value Ref Range Status   08/26/2019 8.5 (H) 1.8 - 7.7 K/uL Final     Comment:     The ANC is based on a white cell differential from an   automated cell counter. It has not been microscopically   reviewed for the presence of abnormal cells. Clinical   correlation is required.         Chemistry        Component Value Date/Time     08/26/2019 1158     K 4.9 08/26/2019 1158     08/26/2019 1158    CO2 26 08/26/2019 1158    BUN 22 08/26/2019 1158    CREATININE 1.4 08/26/2019 1158     (H) 08/26/2019 1158        Component Value Date/Time    CALCIUM 9.2 08/26/2019 1158    ALKPHOS 112 08/26/2019 1158    AST 32 08/26/2019 1158    ALT 30 08/26/2019 1158    BILITOT 0.4 08/26/2019 1158    ESTGFRAFRICA 58.8 (A) 08/26/2019 1158    EGFRNONAA 50.9 (A) 08/26/2019 1158          Assessment:       1. Intrahepatic cholangiocarcinoma    2. Malignant neoplasm metastatic to lung, unspecified laterality    3. Secondary adenocarcinoma of retroperitoneum    4. Antineoplastic chemotherapy induced anemia    5. Neoplasm related pain    6. Hypoxia        Plan:        1,2,3. He will return to [place] to see Dr. Escobar for trial.  4. Transfuse 1 unit PRBC  5.Continue with fentanyl 100 mcg every 48 hours. Refilled Oxycodone  6. Room air pulse ox is 86% at rest. Started home Oxygen. Will obtain hospital bed (needs for positioning and keep head of the bed elevated) and rollator (for gait instability)    Above care plan was discussed with patient and accompanying wife and son and all questions were addressed to their satisfaction

## 2019-08-27 ENCOUNTER — INFUSION (OUTPATIENT)
Dept: INFUSION THERAPY | Facility: HOSPITAL | Age: 69
End: 2019-08-27
Attending: OBSTETRICS & GYNECOLOGY
Payer: MEDICARE

## 2019-08-27 VITALS
RESPIRATION RATE: 18 BRPM | HEART RATE: 65 BPM | SYSTOLIC BLOOD PRESSURE: 126 MMHG | TEMPERATURE: 98 F | DIASTOLIC BLOOD PRESSURE: 67 MMHG

## 2019-08-27 DIAGNOSIS — T45.1X5A ANTINEOPLASTIC CHEMOTHERAPY INDUCED ANEMIA: ICD-10-CM

## 2019-08-27 DIAGNOSIS — D64.81 ANTINEOPLASTIC CHEMOTHERAPY INDUCED ANEMIA: ICD-10-CM

## 2019-08-27 LAB
BLD PROD TYP BPU: NORMAL
BLOOD UNIT EXPIRATION DATE: NORMAL
BLOOD UNIT TYPE CODE: 6200
BLOOD UNIT TYPE: NORMAL
CODING SYSTEM: NORMAL
DISPENSE STATUS: NORMAL
NUM UNITS TRANS PACKED RBC: NORMAL

## 2019-08-27 PROCEDURE — 27201040 HC RC 50 FILTER

## 2019-08-27 PROCEDURE — 36430 TRANSFUSION BLD/BLD COMPNT: CPT

## 2019-08-27 PROCEDURE — 63600175 PHARM REV CODE 636 W HCPCS: Performed by: INTERNAL MEDICINE

## 2019-08-27 PROCEDURE — 86644 CMV ANTIBODY: CPT

## 2019-08-27 PROCEDURE — P9038 RBC IRRADIATED: HCPCS

## 2019-08-27 PROCEDURE — 86920 COMPATIBILITY TEST SPIN: CPT

## 2019-08-27 PROCEDURE — 25000003 PHARM REV CODE 250: Performed by: INTERNAL MEDICINE

## 2019-08-27 RX ORDER — ACETAMINOPHEN 325 MG/1
650 TABLET ORAL
Status: COMPLETED | OUTPATIENT
Start: 2019-08-27 | End: 2019-08-27

## 2019-08-27 RX ORDER — HYDROCODONE BITARTRATE AND ACETAMINOPHEN 500; 5 MG/1; MG/1
TABLET ORAL ONCE
Status: COMPLETED | OUTPATIENT
Start: 2019-08-27 | End: 2019-08-27

## 2019-08-27 RX ORDER — DIPHENHYDRAMINE HCL 25 MG
25 CAPSULE ORAL ONCE
Status: COMPLETED | OUTPATIENT
Start: 2019-08-27 | End: 2019-08-27

## 2019-08-27 RX ADMIN — SODIUM CHLORIDE: 0.9 INJECTION, SOLUTION INTRAVENOUS at 02:08

## 2019-08-27 RX ADMIN — ACETAMINOPHEN 650 MG: 325 TABLET ORAL at 02:08

## 2019-08-27 RX ADMIN — DIPHENHYDRAMINE HYDROCHLORIDE 25 MG: 25 CAPSULE ORAL at 03:08

## 2019-08-27 NOTE — PLAN OF CARE
Problem: Adult Inpatient Plan of Care  Goal: Plan of Care Review  Outcome: Ongoing (interventions implemented as appropriate)  Pt tolerated 1 unit of PRBC today. NAD. PIV Removed. Catheter Tip intact. Printed AVS, given to pt wife. Discharged home. Escorted by wife in wheelchair.

## 2019-08-28 ENCOUNTER — DOCUMENTATION ONLY (OUTPATIENT)
Dept: HEMATOLOGY/ONCOLOGY | Facility: CLINIC | Age: 69
End: 2019-08-28

## 2019-08-28 NOTE — PROGRESS NOTES
All received from Zoie @ [facility] ([id number]) re: equipment order. Per Zoie, they are not a provider for pts. Insurance. Spoke with pts. Primary sw'er (Radha Lo) and was informed that referral for equipment made [employer] ([phone]).

## 2019-08-29 ENCOUNTER — TELEPHONE (OUTPATIENT)
Dept: TRANSPLANT | Facility: CLINIC | Age: 69
End: 2019-08-29

## 2019-08-29 ENCOUNTER — PATIENT MESSAGE (OUTPATIENT)
Dept: TRANSPLANT | Facility: CLINIC | Age: 69
End: 2019-08-29

## 2019-08-29 ENCOUNTER — TELEPHONE (OUTPATIENT)
Dept: HEMATOLOGY/ONCOLOGY | Facility: CLINIC | Age: 69
End: 2019-08-29

## 2019-08-29 DIAGNOSIS — I50.22 CHRONIC SYSTOLIC HEART FAILURE: Primary | ICD-10-CM

## 2019-08-29 NOTE — TELEPHONE ENCOUNTER
Spoke with mary. They will fax over CMN as nurse needs to have dr ernts initial the form---then fax back.  Nurse provided summer with fax number. She thanked nurse.

## 2019-08-29 NOTE — TELEPHONE ENCOUNTER
1:30 pm:  Dr. Saucedo called and asked pt be scheduled to see Dr. Gibson tomorrow afternoon in that he himself is out of town.  Dr. Saucedo stated pt is currently @ [facility] expecting to return to town tomorrow @ 11:00 am  Called and spoke with pt at this time.  He was expecting this call. Pt confirmed he is still @ [facility] for treatment and his flight will land tomorrow @ 11:00 am.  Pt informed of lab appt tomorrow @ 1:30 pm and appt with Dr. Gibson @ 2:30 pm. Informed of location of appts and will enter a message to him in his [facility] with this office contact phone number since he was not able to write it down at this time.

## 2019-08-30 ENCOUNTER — LAB VISIT (OUTPATIENT)
Dept: LAB | Facility: HOSPITAL | Age: 69
End: 2019-08-30
Attending: INTERNAL MEDICINE
Payer: MEDICARE

## 2019-08-30 ENCOUNTER — OFFICE VISIT (OUTPATIENT)
Dept: TRANSPLANT | Facility: CLINIC | Age: 69
End: 2019-08-30
Attending: INTERNAL MEDICINE
Payer: MEDICARE

## 2019-08-30 VITALS
WEIGHT: 214.5 LBS | HEIGHT: 68 IN | BODY MASS INDEX: 32.51 KG/M2 | SYSTOLIC BLOOD PRESSURE: 146 MMHG | HEART RATE: 71 BPM | DIASTOLIC BLOOD PRESSURE: 79 MMHG

## 2019-08-30 DIAGNOSIS — C78.6 SECONDARY ADENOCARCINOMA OF RETROPERITONEUM: ICD-10-CM

## 2019-08-30 DIAGNOSIS — I50.22 CHRONIC SYSTOLIC HEART FAILURE: ICD-10-CM

## 2019-08-30 DIAGNOSIS — I50.32 CHRONIC DIASTOLIC CONGESTIVE HEART FAILURE: Primary | ICD-10-CM

## 2019-08-30 DIAGNOSIS — I27.20 PULMONARY HYPERTENSION: ICD-10-CM

## 2019-08-30 DIAGNOSIS — C22.1 INTRAHEPATIC CHOLANGIOCARCINOMA: ICD-10-CM

## 2019-08-30 DIAGNOSIS — C79.51 METASTASIS TO BONE: ICD-10-CM

## 2019-08-30 DIAGNOSIS — I50.33 ACUTE ON CHRONIC DIASTOLIC CONGESTIVE HEART FAILURE: Primary | ICD-10-CM

## 2019-08-30 DIAGNOSIS — D64.9 ANEMIA, UNSPECIFIED TYPE: ICD-10-CM

## 2019-08-30 LAB
ALBUMIN SERPL BCP-MCNC: 2.8 G/DL (ref 3.5–5.2)
ALP SERPL-CCNC: 106 U/L (ref 55–135)
ALT SERPL W/O P-5'-P-CCNC: 31 U/L (ref 10–44)
ANION GAP SERPL CALC-SCNC: 9 MMOL/L (ref 8–16)
AST SERPL-CCNC: 31 U/L (ref 10–40)
BILIRUB SERPL-MCNC: 0.5 MG/DL (ref 0.1–1)
BNP SERPL-MCNC: 447 PG/ML (ref 0–99)
BUN SERPL-MCNC: 27 MG/DL (ref 8–23)
CALCIUM SERPL-MCNC: 9.3 MG/DL (ref 8.7–10.5)
CHLORIDE SERPL-SCNC: 100 MMOL/L (ref 95–110)
CO2 SERPL-SCNC: 28 MMOL/L (ref 23–29)
CREAT SERPL-MCNC: 1.4 MG/DL (ref 0.5–1.4)
EST. GFR  (AFRICAN AMERICAN): 58.8 ML/MIN/1.73 M^2
EST. GFR  (NON AFRICAN AMERICAN): 50.9 ML/MIN/1.73 M^2
GLUCOSE SERPL-MCNC: 111 MG/DL (ref 70–110)
MAGNESIUM SERPL-MCNC: 2 MG/DL (ref 1.6–2.6)
POTASSIUM SERPL-SCNC: 5 MMOL/L (ref 3.5–5.1)
PROT SERPL-MCNC: 7.5 G/DL (ref 6–8.4)
SODIUM SERPL-SCNC: 137 MMOL/L (ref 136–145)

## 2019-08-30 PROCEDURE — 99204 OFFICE O/P NEW MOD 45 MIN: CPT | Mod: S$GLB,,, | Performed by: INTERNAL MEDICINE

## 2019-08-30 PROCEDURE — 83735 ASSAY OF MAGNESIUM: CPT

## 2019-08-30 PROCEDURE — 99999 PR PBB SHADOW E&M-EST. PATIENT-LVL III: CPT | Mod: PBBFAC,,, | Performed by: INTERNAL MEDICINE

## 2019-08-30 PROCEDURE — 83880 ASSAY OF NATRIURETIC PEPTIDE: CPT

## 2019-08-30 PROCEDURE — 36415 COLL VENOUS BLD VENIPUNCTURE: CPT

## 2019-08-30 PROCEDURE — 99999 PR PBB SHADOW E&M-EST. PATIENT-LVL III: ICD-10-PCS | Mod: PBBFAC,,, | Performed by: INTERNAL MEDICINE

## 2019-08-30 PROCEDURE — 80053 COMPREHEN METABOLIC PANEL: CPT

## 2019-08-30 PROCEDURE — 99204 PR OFFICE/OUTPT VISIT, NEW, LEVL IV, 45-59 MIN: ICD-10-PCS | Mod: S$GLB,,, | Performed by: INTERNAL MEDICINE

## 2019-08-30 RX ORDER — POTASSIUM CHLORIDE 750 MG/1
10 TABLET, EXTENDED RELEASE ORAL 2 TIMES DAILY
Qty: 60 TABLET | Refills: 1 | Status: SHIPPED | OUTPATIENT
Start: 2019-08-30 | End: 2019-09-06

## 2019-08-30 RX ORDER — BUMETANIDE 1 MG/1
1 TABLET ORAL 2 TIMES DAILY
Qty: 60 TABLET | Refills: 1 | Status: SHIPPED | OUTPATIENT
Start: 2019-08-30 | End: 2019-09-19 | Stop reason: SDUPTHER

## 2019-08-30 NOTE — PATIENT INSTRUCTIONS
Weigh every day in the morning and keep a log  If he loses more than 10 pounds by Monday then reduce bumetanide and KCL potassium to once a day

## 2019-08-30 NOTE — Clinical Note
FERN Crespo need to coordinate his visit where he is seen at [facility] alternating with us for now every week sees one of us.  OK to add on for me

## 2019-08-31 NOTE — PROGRESS NOTES
Subjective:     NOTE TO SELF: DO NOT GET HIM ONTO EXAM TABLE UNLESS MANDATORY DUE TO PAIN FROM BONE METASTATIC DISEASE     Patient ID:  Eric Brown is a 68 y.o. male who presents for evaluation of Congestive Heart Failure    HPI:  This 68-year-old male migrated [place] the [place] [facility] and was recently diagnosed with congestive heart failure with preserved left ventricular ejection fraction and pulmonary hypertension.  Echocardiograms demonstrate evidence restrictive cardiomyopathy and pulmonary hypertension.  He has a prior history of mitral repair and a Maze performed [facility] 2005.  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 He is treatment metastatic lesions bone and to the periaortic at [facility] with an experimental chemotherapy regimen.  He did receive radiation for eligio-aortic adenopathy and as ureter stents for obstruction due to this adenopathy.  He never had radiation of the chest.    As part of that treatment gets IV therapy every 2 weeks. On  July 30th had a reaction requiring IV steroids.  Since that he has noted increasing shortness of breath and peripheral edema.  Prior to this was able walk 3-5 miles a day.  Currently sleeps on 1 pillow elevates his legs on 3 pillows for the edema.  Notes isolated extra beats of his heart but nothing sustained or rapid.  He occasionally has dizzy spells he stands quickly.  No presyncope or syncope.    At his last treatment [facility] he was given lasix 40 mg IVP with marked diuresis.  Also important to note that within the past 10 days received blood transfusion.    No family history of amyloidosis.  No personal history of carpal tunnel or neuropathy.  No history of DVT or PE.    He has significant pain related to his malignancy and bone pain.  Currently on oxycodone, dilaudid and fentanyl patch for relief.    Past Medical History:   Diagnosis Date    A-fib     s/p operative MAZE currently sinus rhythm--done with MV repair 2005  at [facility]    BPH (benign prostatic hypertrophy) with urinary obstruction     Cataract     Cholangiocarcinoma of liver 2012 with recurrence 2015 with periaortic lymph invasion and metastatic to long bone of lower extremity; s/p resection x 2, XRT x1, chemotherapy ongoing and followed at [facility] 1-22-15    recurrence    Gout, chronic     Hx of colonic polyps     Hyperlipidemia     Hypertension     Kidney disease  Kidney stone  Ureteral obstruction due to periaortic lymphadenopathy from cancer treated with stent     Prediabetes     Vitreous floaters        Past Surgical History:   Procedure Laterality Date    BIOPSY, BLADDER N/A 11/27/2018    Performed by Rodger [facility]og[facility]era [facility] at [facility] OR 1ST FLR    CARDIAC SURGERY  2005    [facility] MV repair + MAZE    CHOLECYSTECTOMY      5-2012    COLONOSCOPY N/A 11/24/2015    Performed by Emma Dhillon MD at [facility] ENDO (4TH FLR)    CYSTOSCOPY Right 4/16/2019    Performed by Rodger Nog[facility]era MD at [facility] OR 1ST FLR    CYSTOSCOPY  11/27/2018    Performed by Rodger [facility]og[facility]era MD at [facility] 1ST [facility]    EXTRACORPOREAL SHOCK WAVE LITHOTRIPSY  2007 and 2008    EXTRACTION, CATARACT, WITH IOL INSERTION Left 6/17/2019    Per[facility] by [facility] at [facility] OR    EXTRACTION, CATARACT, WITH IOL INSERTION Right 6/3/2019    Performed by [facility] at [facility] OR    litho      LIVER RESECTION  5-2012    Distal pancreatectomy    LIVER RESECTION  1-22-15    Hepatic cholangiocarcinoma    PLACEMENT-STENT Right 11/27/2018    Performed by Rodger Noguera MD at [facility] OR 1ST [place]    PYELOGRAM, RETROGRADE Right 4/16/2019    Performed by Rodger Noguera MD at [facility] OR 1ST FLR    REMOVAL-PORT-A-CATH N/A 2/22/2016    Performed by [facility] at [facility] OR 2ND FLR    REPLACEMENT, STENT Right 4/16/2019    Performed by Rodger Noguera MD at [facility] OR 1ST FLR    URETEROSCOPY Right 4/16/2019    Performed by Rodger Noguera MD at [facility] OR [address] [place] [address]        Family History   Problem Relation Age of Onset    Hypertension Mother     Cataracts Father     Amblyopia Neg Hx     Blindness Neg Hx     Glaucoma Neg Hx     Macular degeneration Neg Hx     Retinal detachment Neg Hx     Strabismus Neg Hx     Melanoma Neg Hx        Social History     Socioeconomic History    Marital status:    Occupational History     Employer: [employer]   Tobacco Use    Smoking status: Former Smoker     Last attempt to quit: 1985     Years since quittin.5    Smokeless tobacco: Never Used   Substance and Sexual Activity    Alcohol use: No    Drug use: No     Medication Sig    allopurinol (ZYLOPRIM) 100 MG tablet TAKE 1 TABLET (100 MG TOTAL) BY MOUTH ONCE DAILY.    atorvastatin (LIPITOR) 10 MG tablet TAKE 1 TABLET (10 MG TOTAL) BY MOUTH ONCE DAILY.    cholecalciferol, vitamin D3, (VITAMIN D3) 1,000 unit capsule Take 5,000 mg by mouth.    felodipine (PLENDIL) 5 MG 24 hr tablet Take 1 tablet (5 mg total) by mouth 2 (two) times daily.    fentaNYL (DURAGESIC) 100 mcg/hr Place 1 patch onto the skin every 48 hours.    HYDROmorphone (DILAUDID) 2 MG tablet Take 1 tablet (2 mg total) by mouth every 4 (four) hours as needed for Pain (Take if Oxycodone does not work).    metoprolol succinate (TOPROL-XL) 100 MG 24 hr tablet Take 1 tablet (100 mg total) by mouth once daily.    MULTIVIT-IRON-MIN-FOLIC ACID 3,500-18-0.4 UNIT-MG-MG ORAL CHEW Take by mouth.    multivit-min-FA-lycopen-lutein 0.4-300-250 mg-mcg-mcg Tab Take by mouth.    omeprazole (PRILOSEC) 20 MG capsule Take 1 capsule (20 mg total) by mouth once daily.    ondansetron (ZOFRAN) 8 MG tablet TAKE 1 TABLET (8 MG TOTAL) BY MOUTH 4 (FOUR) TIMES DAILY AS NEEDED FOR NAUSEA.    oxyCODONE (ROXICODONE) 10 mg Tab immediate release tablet Take 1 tablet (10 mg total) by mouth every 4 (four) hours as needed for Pain. More than 7 days is medically necessary    promethazine (PHENERGAN) 25 MG tablet Take 25 mg by mouth every 6  (six) hours as needed.    tamsulosin (FLOMAX) 0.4 mg Cap Take 1 capsule (0.4 mg total) by mouth once daily.    triamcinolone acetonide 0.1% (KENALOG) 0.1 % cream AAA bid     Review of patient's allergies indicates:   Allergen Reactions    Avelumab Other (See Comments)     Mild SOB, wheezing, chills    Iodine and iodide containing products Rash     5/22/18 premedicated prenednsone 50 mg po X3 and benadryl 50 mg po tablet  Prior to  Ct scan.  7/3/18- As per pt last scan 6 weeks ago took 13 hr prep med prednisone 50 mg & benadryl 50 mg tablet & was tolerated well post scan    Iohexol Hives and Rash     Rash on L leg, resolves in 1 hour. Pt did well with 13hr prep of Prednisone 50mg POx3 doses and Benadryl 50mg PO during CT done back in 2/2017.      Review of Systems   Constitution: Positive for malaise/fatigue and weight gain (went from 190 to 214# past month). Negative for chills, fever, night sweats and weight loss.   HENT: Negative for hoarse voice.    Cardiovascular: Positive for dyspnea on exertion, leg swelling and palpitations. Negative for chest pain, irregular heartbeat, near-syncope, orthopnea, paroxysmal nocturnal dyspnea and syncope.   Respiratory: Positive for cough (dry), shortness of breath and snoring. Negative for sputum production and wheezing.    Hematologic/Lymphatic: Bruises/bleeds easily (bruising).   Musculoskeletal: Positive for joint pain (hip). Negative for arthritis and stiffness.        Bone pain due to metastatic disease   Gastrointestinal: Negative for hematochezia and melena. Constipation: since on opiates for pain control.   Genitourinary: Positive for hematuria (occassional).   Neurological: Positive for dizziness (occasionally if stands up quickly). Negative for brief paralysis, focal weakness, seizures and weakness.     Objective:   Physical Exam   Constitutional: He is oriented to person, place, and time. No distress.   BP (!) 146/79 (BP Location: Left arm, Patient Position:  "Sitting, BP Method: Medium (Automatic))  Later by me manual cuff both arms, sitting right 136/76 and left 140/80   Pulse 71   Ht 5' 8" (1.727 m)   Wt 97.3 kg (214 lb 8.1 oz)   BMI 32.62 kg/m²   Friendly, cooperative, thin male in NAD; clearly in pain getting onto and off of exam table as well as when supine on back for exam--it was at that time that I learned of the bone mets   HENT:   Head: Normocephalic and atraumatic.   Eyes: Conjunctivae are normal. Right eye exhibits no discharge. Left eye exhibits no discharge. No scleral icterus.   Neck: JVD (18 cm water) present. No thyromegaly present.   Cardiovascular: Normal rate and regular rhythm. Exam reveals no gallop and no friction rub.   Murmur (Grade 1/6 systolic murmur LLSB. RLSB and apex) heard.  Pulmonary/Chest: Effort normal and breath sounds normal.   Abdominal: Soft. Bowel sounds are normal. He exhibits mass (liver span 14 cm). He exhibits no distension. There is no tenderness. There is no rebound and no guarding.   Musculoskeletal: He exhibits edema (3+). He exhibits no tenderness.   Clearly in pain lying supine on hard exam table   Neurological: He is alert and oriented to person, place, and time.   Skin: Skin is warm and dry. He is not diaphoretic.   Psychiatric: He has a normal mood and affect. His behavior is normal. Judgment and thought content normal.        Lab Results   Component Value Date     (H) 08/30/2019     08/30/2019    K 5.0 08/30/2019    MG 2.0 08/30/2019     08/30/2019    CO2 28 08/30/2019    BUN 27 (H) 08/30/2019    CREATININE 1.4 08/30/2019     (H) 08/30/2019    HGBA1C 5.9 (H) 08/13/2018    AST 31 08/30/2019    ALT 31 08/30/2019    ALBUMIN 2.8 (L) 08/30/2019    PROT 7.5 08/30/2019    BILITOT 0.5 08/30/2019    WBC 10.76 08/26/2019    HGB 8.9 (L) 08/26/2019    HCT 30.1 (L) 08/26/2019     (H) 08/26/2019 8/5/2019 ECHO Conclusion   · Normal left ventricular systolic function. The estimated ejection " fraction is 55%  · Indeterminate left ventricular diastolic function left ventricular diastolic dysfunction.  · Mild tricuspid regurgitation.  · Severe left atrial enlargement.  · Normal right ventricular systolic function.  · Mild mitral regurgitation; mitral valve ring noted  · Mild right atrial enlargement.  · Intermediate central venous pressure (8 mm Hg).  · The estimated PA systolic pressure is 54 mm Hg  · Pulmonary hypertension present.        Last EKG 11/23/2018 sinus donna otherwise normal    Assessment     1. Acute on chronic diastolic congestive heart failure    2. Pulmonary hypertension, most likely WHO Group 2    3. Intrahepatic cholangiocarcinoma    4. Secondary adenocarcinoma of retroperitoneum    5. Metastasis to bone      Plan:   The most likely cause of the PH is left heart disease.  Will reassess PA pressure after diuresis  Diuresis with bumetanide 1 mg BID and KCl 10 meq BID  BMP on Tuesday 9/3  RTC 1 week and thereafter will try and see him between his trips to [facility] (goes every 2 weeks) for now until HF controlled  Weigh every day in the morning and keep a log  If he loses more than 10 pounds by Monday then reduce bumetanide and KCL potassium to once a day  Enroll in HF clinic    NOTE TO SELF: DO NOT GET HIM ONTO EXAM TABLE UNLESS MANDATORY DUE TO PAIN FROM BONE METASTATIC DISEASE

## 2019-09-01 PROBLEM — C79.51 METASTASIS TO BONE: Status: ACTIVE | Noted: 2019-09-01

## 2019-09-01 PROBLEM — I50.33 ACUTE ON CHRONIC DIASTOLIC CONGESTIVE HEART FAILURE: Status: ACTIVE | Noted: 2019-09-01

## 2019-09-03 ENCOUNTER — LAB VISIT (OUTPATIENT)
Dept: LAB | Facility: HOSPITAL | Age: 69
End: 2019-09-03
Attending: INTERNAL MEDICINE
Payer: MEDICARE

## 2019-09-03 DIAGNOSIS — I50.33 ACUTE ON CHRONIC DIASTOLIC CONGESTIVE HEART FAILURE: ICD-10-CM

## 2019-09-03 LAB
ANION GAP SERPL CALC-SCNC: 11 MMOL/L (ref 8–16)
BUN SERPL-MCNC: 24 MG/DL (ref 8–23)
CALCIUM SERPL-MCNC: 9.2 MG/DL (ref 8.7–10.5)
CHLORIDE SERPL-SCNC: 100 MMOL/L (ref 95–110)
CO2 SERPL-SCNC: 29 MMOL/L (ref 23–29)
CREAT SERPL-MCNC: 1.4 MG/DL (ref 0.5–1.4)
EST. GFR  (AFRICAN AMERICAN): 58.8 ML/MIN/1.73 M^2
EST. GFR  (NON AFRICAN AMERICAN): 50.9 ML/MIN/1.73 M^2
GLUCOSE SERPL-MCNC: 142 MG/DL (ref 70–110)
POTASSIUM SERPL-SCNC: 5.2 MMOL/L (ref 3.5–5.1)
SODIUM SERPL-SCNC: 140 MMOL/L (ref 136–145)

## 2019-09-03 PROCEDURE — 80048 BASIC METABOLIC PNL TOTAL CA: CPT

## 2019-09-03 PROCEDURE — 36415 COLL VENOUS BLD VENIPUNCTURE: CPT

## 2019-09-06 ENCOUNTER — OFFICE VISIT (OUTPATIENT)
Dept: TRANSPLANT | Facility: CLINIC | Age: 69
End: 2019-09-06
Attending: INTERNAL MEDICINE
Payer: MEDICARE

## 2019-09-06 VITALS
BODY MASS INDEX: 31.97 KG/M2 | HEIGHT: 67 IN | OXYGEN SATURATION: 93 % | WEIGHT: 203.69 LBS | DIASTOLIC BLOOD PRESSURE: 74 MMHG | SYSTOLIC BLOOD PRESSURE: 126 MMHG | HEART RATE: 78 BPM

## 2019-09-06 DIAGNOSIS — I50.32 CHRONIC DIASTOLIC CONGESTIVE HEART FAILURE: Primary | ICD-10-CM

## 2019-09-06 DIAGNOSIS — I13.0 HYPERTENSIVE CARDIOVASCULAR-RENAL DISEASE, STAGE 1-4 OR UNSPECIFIED CHRONIC KIDNEY DISEASE, WITH HEART FAILURE: ICD-10-CM

## 2019-09-06 DIAGNOSIS — C22.1 CHOLANGIOCARCINOMA: ICD-10-CM

## 2019-09-06 DIAGNOSIS — I27.20 PULMONARY HYPERTENSION: ICD-10-CM

## 2019-09-06 DIAGNOSIS — I10 ESSENTIAL HYPERTENSION: ICD-10-CM

## 2019-09-06 PROCEDURE — 99213 OFFICE O/P EST LOW 20 MIN: CPT | Mod: S$GLB,,, | Performed by: INTERNAL MEDICINE

## 2019-09-06 PROCEDURE — 99213 PR OFFICE/OUTPT VISIT, EST, LEVL III, 20-29 MIN: ICD-10-PCS | Mod: S$GLB,,, | Performed by: INTERNAL MEDICINE

## 2019-09-06 PROCEDURE — 99999 PR PBB SHADOW E&M-EST. PATIENT-LVL III: ICD-10-PCS | Mod: PBBFAC,,, | Performed by: INTERNAL MEDICINE

## 2019-09-06 PROCEDURE — 99999 PR PBB SHADOW E&M-EST. PATIENT-LVL III: CPT | Mod: PBBFAC,,, | Performed by: INTERNAL MEDICINE

## 2019-09-06 NOTE — PATIENT INSTRUCTIONS
Stop plendil but if BP is consistently above 140 to 150 top number or 90 on bottom resume but take only one tablet at supper

## 2019-09-06 NOTE — PROGRESS NOTES
"Subjective:     Patient ID:  Eric Brown is a 68 y.o. male who presents for follow-up of Congestive Heart Failure (Acute on chronic diastolic congestive heart failure,f/u visit)    HPI:  67 yo male seen 1 week ago (see note) with ADHFpEF and PH (most likely WHO group 2).  I treated him with bumetanide 1 mg BID and KCl 10 meq BID until BMP returned 9/3 with mildly elevated K 5.2.  At that time KCL was stopped.  He comes today on bumetanide 1 mg twice daily and his prior medical regimen.    On this treatment he has lost between 10 and 11 lb.  He reports his legs and abdomen feel less tense and he is breathing better.  He did note a little dizzyness today in the early morning after 1st getting up.  His wife reports oxygen saturations from the upper 80s to the upper 90s though the patient reports they run 94-95% most of the time.  He reports this morning heart rate  beats per minute and he usually runs a little slower in the 70s and 80s.    ROS not repeated     Objective:   Physical Exam   Constitutional: He is oriented to person, place, and time. No distress.   /64 (BP Location: Left arm, Patient Position: Sitting, BP Method: Medium (Automatic))   Pulse 77   Ht 5' 7" (1.702 m)   Wt 92.4 kg (203 lb 11.3 oz)   SpO2 (!) 93%   BMI 31.90 kg/m²   Last visit wt 97.3 kg (214 lb 8.1 oz)  Friendly, cooperative, thin male in NAD; clearly in pain getting onto and off of exam table as well as when supine on back for exam--it was at that time that I learned of the bone mets    At end of visit BP sitting 124/72 P 78 and standing 126/74    HENT:   Head: Normocephalic and atraumatic.   Eyes: Conjunctivae are normal. Right eye exhibits no discharge. Left eye exhibits no discharge. No scleral icterus.   Neck: JVD (12 cm water) present. No thyromegaly present.   Cardiovascular: Normal rate and regular rhythm. Exam reveals no gallop and no friction rub.   Murmur (Grade 1/6 systolic murmur LLSB. RLSB and apex) " heard.  Pulmonary/Chest: Effort normal and breath sounds normal.   Abdominal:   Examined in wheelchair due to pain at last visit   Musculoskeletal: He exhibits edema (2-3+). He exhibits no tenderness.   Neurological: He is alert and oriented to person, place, and time.   Skin: Skin is warm and dry. He is not diaphoretic.   Psychiatric: He has a normal mood and affect. His behavior is normal. Judgment and thought content normal.      Assessment:     1. Chronic diastolic congestive heart failure    2. Hypertensive cardiovascular-renal disease, stage 1-4 or unspecified chronic kidney disease, with heart failure    3. Pulmonary hypertension    4. Essential hypertension    5. Cholangiocarcinoma      Plan:   They asked about the timing of a repeat echo I explained we should remove the fluid first and then repeat study to check on pulmonary artery pressures which they are very concerned about.  I also explained that I do not feel the result would   Stop plendil--currently on 5 mg BID but if BP consistently above 140-150/90 resume plendil 5 mg with supper  He is going to [facility] Monday and returns Friday of next week.  He will be doing lab on Tuesday thus I will order no testing for next week and use their results.  RTC see me 9/19 as add on to [facility] clinic with BMP

## 2019-09-08 DIAGNOSIS — G89.3 NEOPLASM RELATED PAIN: ICD-10-CM

## 2019-09-08 DIAGNOSIS — C22.1 INTRAHEPATIC CHOLANGIOCARCINOMA: ICD-10-CM

## 2019-09-09 RX ORDER — OXYCODONE HYDROCHLORIDE 10 MG/1
10 TABLET ORAL EVERY 4 HOURS PRN
Qty: 120 TABLET | Refills: 0 | Status: SHIPPED | OUTPATIENT
Start: 2019-09-09 | End: 2019-10-03 | Stop reason: SDUPTHER

## 2019-09-09 RX ORDER — HYDROMORPHONE HYDROCHLORIDE 2 MG/1
2 TABLET ORAL EVERY 4 HOURS PRN
Qty: 90 TABLET | Refills: 0 | Status: SHIPPED | OUTPATIENT
Start: 2019-09-09 | End: 2019-10-03 | Stop reason: SDUPTHER

## 2019-09-09 RX ORDER — ONDANSETRON HYDROCHLORIDE 8 MG/1
8 TABLET, FILM COATED ORAL 4 TIMES DAILY PRN
Qty: 60 TABLET | Refills: 2 | Status: SHIPPED | OUTPATIENT
Start: 2019-09-09 | End: 2020-09-08

## 2019-09-12 ENCOUNTER — PATIENT MESSAGE (OUTPATIENT)
Dept: HEMATOLOGY/ONCOLOGY | Facility: CLINIC | Age: 69
End: 2019-09-12

## 2019-09-13 ENCOUNTER — TELEPHONE (OUTPATIENT)
Dept: HEMATOLOGY/ONCOLOGY | Facility: CLINIC | Age: 69
End: 2019-09-13

## 2019-09-13 NOTE — TELEPHONE ENCOUNTER
----- Message from Louis Naylor RN sent at 9/12/2019  1:55 PM CDT -----  I have sent a message to him to ask if he can come in  ----- Message -----  From: Lindsay Fuentes MD  Sent: 9/12/2019   1:34 PM  To: Brie Hines RN, Louis Naylor, RN, #    Louis,  Could you schedule Dr. Brown for a visit on Monday morning 9/16 in the oncology space. I have 8-10 am free if that will work.   Thanks

## 2019-09-16 ENCOUNTER — OFFICE VISIT (OUTPATIENT)
Dept: PAIN MEDICINE | Facility: CLINIC | Age: 69
End: 2019-09-16
Attending: ANESTHESIOLOGY
Payer: MEDICARE

## 2019-09-16 ENCOUNTER — ANESTHESIA EVENT (OUTPATIENT)
Dept: SURGERY | Facility: HOSPITAL | Age: 69
End: 2019-09-16
Payer: MEDICARE

## 2019-09-16 ENCOUNTER — PATIENT OUTREACH (OUTPATIENT)
Dept: ADMINISTRATIVE | Facility: OTHER | Age: 69
End: 2019-09-16

## 2019-09-16 ENCOUNTER — TELEPHONE (OUTPATIENT)
Dept: UROLOGY | Facility: CLINIC | Age: 69
End: 2019-09-16

## 2019-09-16 ENCOUNTER — HOSPITAL ENCOUNTER (OUTPATIENT)
Dept: RADIATION THERAPY | Facility: HOSPITAL | Age: 69
Discharge: HOME | End: 2019-09-16
Attending: RADIOLOGY
Payer: MEDICARE

## 2019-09-16 ENCOUNTER — TELEPHONE (OUTPATIENT)
Dept: HEMATOLOGY/ONCOLOGY | Facility: CLINIC | Age: 69
End: 2019-09-16

## 2019-09-16 ENCOUNTER — OFFICE VISIT (OUTPATIENT)
Dept: RADIATION ONCOLOGY | Facility: CLINIC | Age: 69
End: 2019-09-16
Payer: MEDICARE

## 2019-09-16 ENCOUNTER — OFFICE VISIT (OUTPATIENT)
Dept: HEMATOLOGY/ONCOLOGY | Facility: CLINIC | Age: 69
End: 2019-09-16
Payer: MEDICARE

## 2019-09-16 VITALS
TEMPERATURE: 99 F | DIASTOLIC BLOOD PRESSURE: 67 MMHG | WEIGHT: 206.81 LBS | RESPIRATION RATE: 16 BRPM | HEART RATE: 73 BPM | OXYGEN SATURATION: 92 % | SYSTOLIC BLOOD PRESSURE: 141 MMHG | BODY MASS INDEX: 31.34 KG/M2 | HEIGHT: 68 IN

## 2019-09-16 VITALS
WEIGHT: 206.13 LBS | HEIGHT: 68 IN | HEART RATE: 70 BPM | SYSTOLIC BLOOD PRESSURE: 130 MMHG | BODY MASS INDEX: 31.24 KG/M2 | DIASTOLIC BLOOD PRESSURE: 73 MMHG | TEMPERATURE: 98 F

## 2019-09-16 VITALS
RESPIRATION RATE: 16 BRPM | BODY MASS INDEX: 31.24 KG/M2 | TEMPERATURE: 98 F | HEIGHT: 68 IN | SYSTOLIC BLOOD PRESSURE: 130 MMHG | HEART RATE: 70 BPM | WEIGHT: 206.13 LBS | DIASTOLIC BLOOD PRESSURE: 73 MMHG

## 2019-09-16 DIAGNOSIS — Z51.5 PALLIATIVE CARE BY SPECIALIST: ICD-10-CM

## 2019-09-16 DIAGNOSIS — C79.51 METASTASIS TO BONE: ICD-10-CM

## 2019-09-16 DIAGNOSIS — G89.3 CANCER ASSOCIATED PAIN: Primary | ICD-10-CM

## 2019-09-16 DIAGNOSIS — F11.90 CHRONIC, CONTINUOUS USE OF OPIOIDS: ICD-10-CM

## 2019-09-16 DIAGNOSIS — G89.29 CHRONIC HIP PAIN, BILATERAL: Primary | ICD-10-CM

## 2019-09-16 DIAGNOSIS — C22.1 INTRAHEPATIC CHOLANGIOCARCINOMA: Primary | ICD-10-CM

## 2019-09-16 DIAGNOSIS — C22.1 CHOLANGIOCARCINOMA: Primary | ICD-10-CM

## 2019-09-16 DIAGNOSIS — M25.551 CHRONIC HIP PAIN, BILATERAL: Primary | ICD-10-CM

## 2019-09-16 DIAGNOSIS — G89.29 CHRONIC HIP PAIN, BILATERAL: ICD-10-CM

## 2019-09-16 DIAGNOSIS — M25.552 CHRONIC HIP PAIN, BILATERAL: Primary | ICD-10-CM

## 2019-09-16 DIAGNOSIS — C79.51 MALIGNANT NEOPLASM METASTATIC TO BONE: ICD-10-CM

## 2019-09-16 DIAGNOSIS — M25.552 CHRONIC HIP PAIN, BILATERAL: ICD-10-CM

## 2019-09-16 DIAGNOSIS — M25.551 CHRONIC HIP PAIN, BILATERAL: ICD-10-CM

## 2019-09-16 DIAGNOSIS — I50.32 CHRONIC DIASTOLIC CONGESTIVE HEART FAILURE: ICD-10-CM

## 2019-09-16 PROCEDURE — 77290 PR  SET RADN THERAPY FIELD COMPLEX: ICD-10-PCS | Mod: 26,,, | Performed by: RADIOLOGY

## 2019-09-16 PROCEDURE — 77014 HC CT GUIDANCE RADIATION THERAPY FLDS PLACEMENT: CPT | Mod: TC | Performed by: RADIOLOGY

## 2019-09-16 PROCEDURE — 77290 THER RAD SIMULAJ FIELD CPLX: CPT | Mod: 26,,, | Performed by: RADIOLOGY

## 2019-09-16 PROCEDURE — 99204 OFFICE O/P NEW MOD 45 MIN: CPT | Mod: GC,S$GLB,, | Performed by: ANESTHESIOLOGY

## 2019-09-16 PROCEDURE — 99215 PR OFFICE/OUTPT VISIT, EST, LEVL V, 40-54 MIN: ICD-10-PCS | Mod: S$GLB,,, | Performed by: INTERNAL MEDICINE

## 2019-09-16 PROCEDURE — 99999 PR PBB SHADOW E&M-EST. PATIENT-LVL III: CPT | Mod: PBBFAC,,, | Performed by: INTERNAL MEDICINE

## 2019-09-16 PROCEDURE — 99499 UNLISTED E&M SERVICE: CPT | Mod: S$GLB,,, | Performed by: RADIOLOGY

## 2019-09-16 PROCEDURE — 99999 PR PBB SHADOW E&M-EST. PATIENT-LVL III: CPT | Mod: PBBFAC,,, | Performed by: ANESTHESIOLOGY

## 2019-09-16 PROCEDURE — 99999 PR PBB SHADOW E&M-EST. PATIENT-LVL III: ICD-10-PCS | Mod: PBBFAC,,, | Performed by: RADIOLOGY

## 2019-09-16 PROCEDURE — 99204 PR OFFICE/OUTPT VISIT, NEW, LEVL IV, 45-59 MIN: ICD-10-PCS | Mod: GC,S$GLB,, | Performed by: ANESTHESIOLOGY

## 2019-09-16 PROCEDURE — 77263 THER RADIOLOGY TX PLNG CPLX: CPT | Mod: ,,, | Performed by: RADIOLOGY

## 2019-09-16 PROCEDURE — 99499 NO LOS: ICD-10-PCS | Mod: S$GLB,,, | Performed by: RADIOLOGY

## 2019-09-16 PROCEDURE — 99999 PR PBB SHADOW E&M-EST. PATIENT-LVL III: CPT | Mod: PBBFAC,,, | Performed by: RADIOLOGY

## 2019-09-16 PROCEDURE — 77290 THER RAD SIMULAJ FIELD CPLX: CPT | Mod: TC | Performed by: RADIOLOGY

## 2019-09-16 PROCEDURE — 99999 PR PBB SHADOW E&M-EST. PATIENT-LVL III: ICD-10-PCS | Mod: PBBFAC,,, | Performed by: ANESTHESIOLOGY

## 2019-09-16 PROCEDURE — 77263 PR  RADIATION THERAPY PLAN COMPLEX: ICD-10-PCS | Mod: ,,, | Performed by: RADIOLOGY

## 2019-09-16 PROCEDURE — 99999 PR PBB SHADOW E&M-EST. PATIENT-LVL III: ICD-10-PCS | Mod: PBBFAC,,, | Performed by: INTERNAL MEDICINE

## 2019-09-16 PROCEDURE — 99215 OFFICE O/P EST HI 40 MIN: CPT | Mod: S$GLB,,, | Performed by: INTERNAL MEDICINE

## 2019-09-16 RX ORDER — METHADONE HYDROCHLORIDE 5 MG/1
10 TABLET ORAL EVERY 12 HOURS
Qty: 120 TABLET | Refills: 0 | Status: SHIPPED | OUTPATIENT
Start: 2019-09-16 | End: 2019-10-03 | Stop reason: SDUPTHER

## 2019-09-16 RX ORDER — PREDNISONE 5 MG/1
10 TABLET ORAL DAILY
Qty: 30 TABLET | Refills: 0 | Status: SHIPPED | OUTPATIENT
Start: 2019-09-16 | End: 2019-10-01 | Stop reason: SDUPTHER

## 2019-09-16 RX ORDER — NALOXONE HYDROCHLORIDE 4 MG/.1ML
1 SPRAY NASAL ONCE
Qty: 2 EACH | Refills: 0 | Status: SHIPPED | OUTPATIENT
Start: 2019-09-16 | End: 2019-09-17

## 2019-09-16 NOTE — PROGRESS NOTES
Progress Note  Palliative Care      Reason for Consult: Better pain control      ASSESSMENT/PLAN:     Plan/Recommendations:  Eric was seen today for consult and pain.    Diagnoses and all orders for this visit:    Intrahepatic cholangiocarcinoma  -     methadone (DOLOPHINE) 5 MG tablet; Take 2 tablets (10 mg total) by mouth every 12 (twelve) hours.  -     predniSONE (DELTASONE) 5 MG tablet; Take 2 tablets (10 mg total) by mouth once daily. for 10 days  Having ureteral stents changed tomorrow.     Palliative care by specialist  -     methadone (DOLOPHINE) 5 MG tablet; Take 2 tablets (10 mg total) by mouth every 12 (twelve) hours.  -     predniSONE (DELTASONE) 5 MG tablet; Take 2 tablets (10 mg total) by mouth once daily. for 10 days  Back pain is not well controlled - encouraged to see Anesthesia/Pain to see if any nerve block would be helpful.   Chronic diastolic congestive heart failure  Encouraged to take Bumetanide 1 mg in am and early afternoon to decrease edema and for shortness of breath. He has an appointment with Dr. Gibson on Thursday.    Discussed hospice with patient/wife and son Wayne. Will refer once current evaluations complete. Reviewed how to choose hospice provider.    Ethical / Legal Advance Care Planning     surrogate decision maker: Name: Elicia, Relationship: wife. Then son Wayne and daughter Roshni              - Code Status: full for now. Wants fixable things fixed. Does not want death to be prolonged.               - [place]: none         - other advance directive: yes, Capacity to make medical decisions: yes, Conflicts: NONE   Patient does not want to be a burden to wife or kids. Hopes he dies quickly when disease progresses-preferably after good times with family. Still wants to be able to go to football games. Wants better pain control.    -          SUBJECTIVE:     History of Present Illness:  Patient is a 68 y.o. year old male presenting with pain in hips and lower back. He says that the  pain is always there but is excruciating with weight bearing. He is on fentanyl 100 mcg every 48 hours and 4 mg hydromorphone (which relieves the pain to ambulate) but then is sleepy. His wife says he is sleeping a lot. He felt better when taking prednisone for bone pain but it was stopped at [facility] when he was enrolled in a trial. He is off trial now and would like it restarted.     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     Metastasis to bone 9/1/2019    Prediabetes     Vitreous floaters      Past Surgical History:   Procedure Laterality Date    BIOPSY, BLADDER N/A 11/27/2018    Performed [facility] at [facility] OR 1ST FLR    CARDIAC SURGERY  2005    [facility] MV repair + MAZE    CHOLECYSTECTOMY      5-2012    COLONOSCOPY N/A 11/24/2015    Performed by Emma Dhillon MD at [facility] ENDO (4TH FLR)    CYSTOSCOPY Right 4/16/2019    Performed by [facility] at [facility] OR 1ST FLR    CYSTOSCOPY  11/27/2018    Performed by [facility] at [facility] OR 1ST FLR    EXTRACORPOREAL SHOCK WAVE LITHOTRIPSY  2007 and 2008    EXTRACTION, CATARACT, WITH IOL INSERTION Left 6/17/2019    [facility]    EXTRACTION, CATARACT, WITH IOL INSERTION Right 6/3/2019    Per[facility] by [facility] at [facility] OR    litho      LIVER RESECTION  5-2012    Distal pancreatectomy    LIVER RESECTION  1-22-15    Hepatic cholangiocarcinoma    PLACEMENT-STENT Right 11/27/2018    [facility]     PYELOGRAM, RETROGRADE Right 4/16/2019    Performed by [facility] at [facility] OR 1ST FLR    REMOVAL-PORT-A-CATH N/A 2/22/2016    Performed by [facility] at [facility] OR 2ND FLR    REPLACEMENT, STENT Right 4/16/2019    Performed by Rod[facility] at [facility] OR 1ST FLR    URETEROSCOPY Right 4/16/2019    Performed by Rod[facility]era [facility] at [facility] OR 1ST FLR     Family History   Problem Relation Age of Onset    Hypertension Mother     Cataracts Father     Amblyopia Neg Hx     Blindness Neg Hx     Glaucoma Neg Hx     Macular degeneration Neg Hx     Retinal detachment Neg Hx     Strabismus Neg Hx     Melanoma Neg Hx      Review of patient's allergies indicates:   Allergen Reactions    Avelumab Other (See Comments)     Mild SOB, wheezing, chills    Iodine and iodide containing products Rash     5/22/18 premedicated prenednsone 50 mg po X3 and benadryl 50 mg po tablet  Prior to  Ct scan.  7/3/18- As per pt last scan 6 weeks ago took 13 hr prep med prednisone 50 mg & benadryl 50 mg tablet & was tolerated well post scan    Iohexol Hives and Rash     Rash on L leg, resolves in 1 hour. Pt did well with 13hr prep of Prednisone 50mg POx3 doses and Benadryl 50mg PO during CT done back in 2/2017.        Medications:    Current Outpatient Medications:     allopurinol (ZYLOPRIM) 100 MG tablet, TAKE 1 TABLET (100 MG TOTAL) BY MOUTH ONCE DAILY., Disp: 90 tablet, Rfl: 3    bumetanide (BUMEX) 1 MG tablet, Take 1 tablet (1 mg total) by mouth 2 (two) times daily., Disp: 60 tablet, Rfl: 1    cholecalciferol, vitamin D3, (VITAMIN D3) 1,000 unit capsule, Take 5,000 mg by mouth., Disp: , Rfl:     fentaNYL (DURAGESIC) 100 mcg/hr, Place 1 patch onto the skin every 48 hours., Disp: 15 patch, Rfl: 0    HYDROmorphone (DILAUDID) 2 MG tablet, Take 1 tablet (2 mg total) by mouth every 4 (four) hours as needed for Pain (Take if Oxycodone does not work). (Patient taking differently: Take 4 mg by mouth every 4 (four)  hours as needed for Pain (Take if Oxycodone does not work). ), Disp: 90 tablet, Rfl: 0    metoprolol succinate (TOPROL-XL) 100 MG 24 hr tablet, Take 1 tablet (100 mg total) by mouth once daily., Disp: 60 tablet, Rfl: 6    multivit-min-FA-lycopen-lutein 0.4-300-250 mg-mcg-mcg Tab, Take by mouth., Disp: , Rfl:     omeprazole (PRILOSEC) 20 MG capsule, Take 1 capsule (20 mg total) by mouth once daily., Disp: 90 capsule, Rfl: 3    ondansetron (ZOFRAN) 8 MG tablet, Take 1 tablet (8 mg total) by mouth 4 (four) times daily as needed for Nausea., Disp: 60 tablet, Rfl: 2    oxyCODONE (ROXICODONE) 10 mg Tab immediate release tablet, Take 1 tablet (10 mg total) by mouth every 4 (four) hours as needed for Pain. More than 7 days is medically necessary (Patient taking differently: Take 20 mg by mouth every 4 (four) hours as needed for Pain. More than 7 days is medically necessary), Disp: 120 tablet, Rfl: 0    promethazine (PHENERGAN) 25 MG tablet, Take 25 mg by mouth every 6 (six) hours as needed., Disp: , Rfl: 7    tamsulosin (FLOMAX) 0.4 mg Cap, Take 1 capsule (0.4 mg total) by mouth once daily., Disp: 60 capsule, Rfl: 6    methadone (DOLOPHINE) 5 MG tablet, Take 2 tablets (10 mg total) by mouth every 12 (twelve) hours., Disp: 120 tablet, Rfl: 0    predniSONE (DELTASONE) 5 MG tablet, Take 2 tablets (10 mg total) by mouth once daily. for 10 days, Disp: 30 tablet, Rfl: 0      24h Oral Morphine Equivalents (OME):     OBJECTIVE:   Symptom Assessment (ESAS 0-10 scale)     ESAS 0 1 2 3 4 5 6 7 8 9 10   Pain     x         Dyspnea  x            Anxiety              Nausea              Depression               Anorexia              Fatigue    x          Insomnia              Restlessness               Agitation              Constipation    no  Bowel Management Plan (BMP): yes  Diarrhea        no  Comments:   Performance Status: PPS Score 50  ROS:  Review of Systems   Constitutional: Positive for fatigue and unexpected weight  change.   HENT: Negative.    Eyes: Negative.    Respiratory: Positive for shortness of breath.    Cardiovascular: Positive for leg swelling.   Gastrointestinal: Positive for abdominal distention.   Endocrine: Negative.    Genitourinary: Negative.    Musculoskeletal: Positive for arthralgias and back pain.   Skin: Negative.    Allergic/Immunologic: Negative.    Neurological: Negative.    Hematological: Negative.    Psychiatric/Behavioral: Negative.        Physical Exam:  Vitals: Temp: 98.9 °F (37.2 °C) (09/16/19 0906)  Pulse: 73 (09/16/19 0906)  Resp: 16 (09/16/19 0906)  BP: (!) 141/67 (09/16/19 0906)  SpO2: (!) 92 % (09/16/19 0906)  Physical Exam   Constitutional: He is oriented to person, place, and time and well-developed, well-nourished, and in no distress.   HENT:   Head: Normocephalic.   Nose: Nose normal.   Mouth/Throat: Oropharynx is clear and moist.   Eyes: Pupils are equal, round, and reactive to light. Conjunctivae and EOM are normal.   Neck: Normal range of motion. Neck supple.   Cardiovascular: Normal rate, regular rhythm and normal heart sounds.   4 + edema to knee.   Pulmonary/Chest: Effort normal and breath sounds normal.   Few crackles in the bases.   Abdominal: Bowel sounds are normal.   Musculoskeletal: Normal range of motion. He exhibits edema.   Neurological: He is alert and oriented to person, place, and time.   Wobbly gait due to pain. Uses another person for support.   Psychiatric: Mood, memory, affect and judgment normal.   Nursing note and vitals reviewed.    ECG from [facility] 7/2019 .  Labs:  CBC:   WBC   Date Value Ref Range Status   08/26/2019 10.76 3.90 - 12.70 K/uL Final       Hemoglobin   Date Value Ref Range Status   08/26/2019 8.9 (L) 14.0 - 18.0 g/dL Final       Hematocrit   Date Value Ref Range Status   08/26/2019 30.1 (L) 40.0 - 54.0 % Final       Mean Corpuscular Volume   Date Value Ref Range Status   08/26/2019 90 82 - 98 fL Final       Platelets   Date Value Ref Range Status    08/26/2019 409 (H) 150 - 350 K/uL Final           LFT:   Lab Results   Component Value Date    AST 31 08/30/2019    ALKPHOS 106 08/30/2019    BILITOT 0.5 08/30/2019       Albumin:   Albumin   Date Value Ref Range Status   08/30/2019 2.8 (L) 3.5 - 5.2 g/dL Final     Protein:   Total Protein   Date Value Ref Range Status   08/30/2019 7.5 6.0 - 8.4 g/dL Final       Radiology:None      > 50% of 40 min visit spent in chart review, face to face discussion of goals of care,  symptom assessment, coordination of care and emotional support.      Signature: Lindsay Fuentes MD

## 2019-09-16 NOTE — TELEPHONE ENCOUNTER
Called pt son Dr Brown to confirm arrival time of 515am for procedure on 9-17-19. Gave pt son NPO instructions and gave pt son opportunity to ask questions. Pt son verbalized understanding.

## 2019-09-16 NOTE — TELEPHONE ENCOUNTER
----- Message from Lindsay Fuentes MD sent at 9/12/2019  1:34 PM CDT -----  Louis,  Could you schedule Dr. Brown for a visit on Monday morning 9/16 in the oncology space. I have 8-10 am free if that will work.   Thanks

## 2019-09-16 NOTE — PROGRESS NOTES
"  Chronic Pain - New Consult    Referring Physician: Self, Aaareferral    Chief Complaint: No chief complaint on file.       SUBJECTIVE: Disclaimer: This note has been generated using voice-recognition software. There may be typographical errors that have been missed during proof-reading    Initial encounter:    Eric Brown presents to the clinic for the evaluation of cancer related pain in his bilateral hips (R>L). The pain started 7 years ago following cholangiocarcinoma s/p resections in 2012 and 2015 with stage 4 metastasized cholangiocarcinoma in 2017 which has had metastasis "everywhere" and symptoms have been worsening. He also has lower abdominal pain that is worse with eating related to his cancer.     Brief history:    Pain Description:    The pain is located in the bilateral hip area and radiates to the groin and back and down to his mid-thigh bilaterally.      At BEST  2/10     At WORST  9/10 on the WORST day.      On average pain is rated as 6/10.     Today the pain is rated as 6/10    The pain is described as aching and sharp      Symptoms interfere with daily activity.     Exacerbating factors: Standing, Bending, Touching, Walking and Lifting.      Mitigating factors medications.     Patient denies night fever/night sweats, urinary incontinence, bowel incontinence and significant weight loss. He has generalized weakness in his legs.   Patient denies any suicidal or homicidal ideations    Pain Medications:  Current:  Methadone 10mg q12hrs  Oxycodone 20mg q6hrs prn  Dilaudid 4mg q6hrs prn    Tried in Past:  NSAIDs - has tried, but has had episodes of SALLY.   TCA -Never  SNRI -Never  Anti-convulsants -Never  Muscle Relaxants -Never  Opioids- yes    Physical Therapy/Home Exercise: no       report:  Reviewed and consistent with medication use as prescribed.    Pain Procedures: none    Chiropractor -never  Acupuncture - never  TENS unit -never  Spinal decompression -never  Joint replacement " -never    Imaging: none available for review today    Past Medical History:   Diagnosis Date    A-fib     s/p operative MAZE currently sinus rhythm    BPH (benign prostatic hypertrophy) with urinary obstruction     Cataract     Cholangiocarcinoma of liver 1-22-15    recurrence    Gout, chronic     Hx of colonic polyps     Hyperlipidemia     Hypertension     Intrahepatic cholangiocarcinoma 5-2-12    s/p resection, followed at [facility]    Kidney stone     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     Metastasis to bone 9/1/2019    Prediabetes     Vitreous floaters      Past Surgical History:   Procedure Laterality Date    BIOPSY, BLADDER N/A 11/27/2018    Performed by [facility] at [facility] OR 1ST FLR    CARDIAC SURGERY  2005    [facility] MV repair + MAZE    CHOLECYSTECTOMY      5-2012    COLONOSCOPY N/A 11/24/2015    Performed by Emma Dhillon MD at [facility] ENDO (4TH FLR)    CYSTOSCOPY Right 4/16/2019    Performed by [facility] at [facility] OR 1ST FLR    CYSTOSCOPY  11/27/2018    Performed by [facility] at [facility] OR 1ST FLR    EXTRACORPOREAL SHOCK WAVE LITHOTRIPSY  2007 and 2008    EXTRACTION, CATARACT, WITH IOL INSERTION Left 6/17/2019    Per[facility] at [facility] OR    EXTRACTION, CATARACT, WITH IOL INSERTION Right 6/3/2019    Performed by [facility] at [facility] OR    litho      LIVER RESECTION  5-2012    Distal pancreatectomy    LIVER RESECTION  1-22-15    Hepatic cholangiocarcinoma    PLACEMENT-STENT Right 11/27/2018    Performed by [facility] at [facility] OR 1ST FLR    PYELOGRAM, RETROGRADE Right 4/16/2019    Performed by [facility] at [facility] OR 1ST FLR    REMOVAL-PORT-A-CATH N/A 2/22/2016    Performed by [facility] at [facility] OR 2ND FLR    REPLACEMENT, STENT Right 4/16/2019    Performed by [facility] at [facility] OR 1ST FLR    URETEROSCOPY Right 4/16/2019    Performed  by [facility] at [facility] OR [address] [place] [address]     Social History     Socioeconomic History    Marital status:      Spouse name: Not on file    Number of children: Not on file    Years of education: Not on file    Highest education level: Not on file   Occupational History     Employer: [employer]   Social Needs    Financial resource strain: Not on file    Food insecurity:     Worry: Not on file     Inability: Not on file    Transportation needs:     Medical: Not on file     Non-medical: Not on file   Tobacco Use    Smoking status: Former Smoker     Last attempt to quit: 1985     Years since quittin.5    Smokeless tobacco: Never Used   Substance and Sexual Activity    Alcohol use: No     Comment: occasion    Drug use: No    Sexual activity: Not on file   Lifestyle    Physical activity:     Days per week: Not on file     Minutes per session: Not on file    Stress: Not on file   Relationships    Social connections:     Talks on phone: Not on file     Gets together: Not on file     Attends Christianity service: Not on file     Active member of club or organization: Not on file     Attends meetings of clubs or organizations: Not on file     Relationship status: Not on file   Other Topics Concern    Not on file   Social History Narrative    Not on file     Family History   Problem Relation Age of Onset    Hypertension Mother     Cataracts Father     Amblyopia Neg Hx     Blindness Neg Hx     Glaucoma Neg Hx     Macular degeneration Neg Hx     Retinal detachment Neg Hx     Strabismus Neg Hx     Melanoma Neg Hx        Review of patient's allergies indicates:   Allergen Reactions    Avelumab Other (See Comments)     Mild SOB, wheezing, chills    Iodine and iodide containing products Rash     18 premedicated prenednsone 50 mg po X3 and benadryl 50 mg po tablet  Prior to  Ct scan.  7/3/18- As per pt last scan 6 weeks ago took 13 hr prep med prednisone 50 mg & benadryl 50 mg tablet & was  tolerated well post scan    Iohexol Hives and Rash     Rash on L leg, resolves in 1 hour. Pt did well with 13hr prep of Prednisone 50mg POx3 doses and Benadryl 50mg PO during CT done back in 2/2017.        Current Outpatient Medications   Medication Sig    allopurinol (ZYLOPRIM) 100 MG tablet TAKE 1 TABLET (100 MG TOTAL) BY MOUTH ONCE DAILY.    bumetanide (BUMEX) 1 MG tablet Take 1 tablet (1 mg total) by mouth 2 (two) times daily.    cholecalciferol, vitamin D3, (VITAMIN D3) 1,000 unit capsule Take 5,000 mg by mouth.    fentaNYL (DURAGESIC) 100 mcg/hr Place 1 patch onto the skin every 48 hours.    HYDROmorphone (DILAUDID) 2 MG tablet Take 1 tablet (2 mg total) by mouth every 4 (four) hours as needed for Pain (Take if Oxycodone does not work). (Patient taking differently: Take 4 mg by mouth every 4 (four) hours as needed for Pain (Take if Oxycodone does not work). )    methadone (DOLOPHINE) 5 MG tablet Take 2 tablets (10 mg total) by mouth every 12 (twelve) hours.    metoprolol succinate (TOPROL-XL) 100 MG 24 hr tablet Take 1 tablet (100 mg total) by mouth once daily.    multivit-min-FA-lycopen-lutein 0.4-300-250 mg-mcg-mcg Tab Take by mouth.    omeprazole (PRILOSEC) 20 MG capsule Take 1 capsule (20 mg total) by mouth once daily.    ondansetron (ZOFRAN) 8 MG tablet Take 1 tablet (8 mg total) by mouth 4 (four) times daily as needed for Nausea.    oxyCODONE (ROXICODONE) 10 mg Tab immediate release tablet Take 1 tablet (10 mg total) by mouth every 4 (four) hours as needed for Pain. More than 7 days is medically necessary (Patient taking differently: Take 20 mg by mouth every 4 (four) hours as needed for Pain. More than 7 days is medically necessary)    predniSONE (DELTASONE) 5 MG tablet Take 2 tablets (10 mg total) by mouth once daily. for 10 days    promethazine (PHENERGAN) 25 MG tablet Take 25 mg by mouth every 6 (six) hours as needed.    tamsulosin (FLOMAX) 0.4 mg Cap Take 1 capsule (0.4 mg total) by  "mouth once daily.    naloxone (NARCAN) 4 mg/actuation Spry 1 spray (4 mg total) by Nasal route once. for 1 dose     No current facility-administered medications for this visit.        REVIEW OF SYSTEMS:    GENERAL:  No weight loss, malaise or fevers.  HEENT:   No recent changes in vision or hearing  NECK:  Negative for lumps, no difficulty with swallowing.  RESPIRATORY:  Negative for cough, wheezing, patient denies any recent URI. + Pulmonary edema and SOB  CARDIOVASCULAR:  Negative for chest pain, leg swelling or palpitations.  GI:  + for abdominal discomfort worse with eating. Negative for blood in stools or black stools or change in bowel habits. No constipation.   MUSCULOSKELETAL:  See HPI.  SKIN:  Negative for lesions, rash, and itching.  PSYCH:  No mood disorder or recent psychosocial stressors.  Patients sleep is not disturbed secondary to pain.  HEMATOLOGY/LYMPHOLOGY:  Negative for prolonged bleeding, bruising easily or swollen nodes.  Patient is not currently taking any anti-coagulants  ENDO: No history of diabetes or thyroid dysfunction  NEURO:   No history of headaches, syncope, paralysis, seizures or tremors.  All other reviewed and negative other than HPI.    OBJECTIVE:    /73   Pulse 70   Temp 98.2 °F (36.8 °C)   Ht 5' 8" (1.727 m)   Wt 93.5 kg (206 lb 2.1 oz)   BMI 31.34 kg/m²     PHYSICAL EXAMINATION:    GENERAL: Well appearing, in no acute distress, alert and oriented x3.  PSYCH:  Mood and affect appropriate.  SKIN: Skin color, texture, turgor normal, no rashes or lesions.  HEAD/FACE:  Normocephalic, atraumatic. Cranial nerves grossly intact.   CV: RRR with palpation of the radial artery.  PULM: No evidence of respiratory difficulty, symmetric chest rise.  GI:  Soft and non-tender.  BACK: Straight leg raising in the supine position is negative to radicular pain. No pain to palpation over the facet joints of the lumbar spine or spinous processes. Mild pain over right lumbar paraspinals. " Normal range of motion without pain reproduction.  EXTREMITIES: Peripheral joint ROM is full and pain free without obvious instability or laxity in all four extremities. No deformities, edema, or skin discoloration. Good capillary refill.  MUSCULOSKELETAL:  Bilateral hip provocative maneuvers are positive for groin pain with IR/ER bilaterally (R>L).  There is no pain with palpation over the sacroiliac joints bilaterally.  FABERs test is positive bilaterally for groin pain.     Bilateral lower extremity strength is normal and symmetric.  No atrophy or tone abnormalities are noted.  NEURO: Bilateral lower extremity coordination and muscle stretch reflexes are physiologic and symmetric.  Plantar response are downgoing. No clonus.  No loss of sensation is noted.  GAIT: sitting in wheelchair    Lab Results   Component Value Date    WBC 10.76 08/26/2019    HGB 8.9 (L) 08/26/2019    HCT 30.1 (L) 08/26/2019    MCV 90 08/26/2019     (H) 08/26/2019     BMP  Lab Results   Component Value Date     09/06/2019    K 4.1 09/06/2019    CL 95 09/06/2019    CO2 33 (H) 09/06/2019    BUN 20 09/06/2019    CREATININE 1.5 (H) 09/06/2019    CALCIUM 9.6 09/06/2019    ANIONGAP 12 09/06/2019    ESTGFRAFRICA 54.1 (A) 09/06/2019    EGFRNONAA 46.8 (A) 09/06/2019         ASSESSMENT: 68 y.o. year old male with pain, consistent with     Encounter Diagnoses   Name Primary?    Cancer associated pain Yes    Chronic hip pain, bilateral     Chronic, continuous use of opioids     Malignant neoplasm metastatic to bone        PLAN:     - Will schedule for bilateral sensory branches of the femoral and obturator nerve blocks as well as bilateral hip joint injections at the same time.     - If he has good effect will schedule for bilateral sensory branches of the femoral and obturator nerve RFA    - Discussed that he can take Tylenol 650mg q6hrs prn at the same time as he takes his Oxycodone for synergistic effect to help with his pain.     -  Discussed him using a pain diary to help gauge effects of his pain medications    - Continue Methadone, Oxycodone, and Dilaudid. Discussed extensively safe usage of Methadone and opioid medications.     - Can consider starting Gabapentin at next visit to help with neuropathic component of pain.     - Will give RX for naloxone prn reversal of opioid related respiratory depression    - RTC in 1 week    The above plan and management options were discussed at length with patient. Patient is in agreement with the above and verbalized understanding. It will be communicated with the referring physician via electronic record, fax, or mail.    Patient was initially seen and examined by [facility] PGY-IV resident Dr. Raudel Kim. Thank you for allowing me to participate in the care of this patient.       Raudel Kim  09/16/2019     I reviewed and edited the  fellow's note, I conducted my own interview and physical examination and agree with the findings.     Mariah Galeana  09/23/2019

## 2019-09-17 ENCOUNTER — HOSPITAL ENCOUNTER (OUTPATIENT)
Facility: HOSPITAL | Age: 69
Discharge: HOME | End: 2019-09-17
Attending: UROLOGY | Admitting: UROLOGY
Payer: MEDICARE

## 2019-09-17 ENCOUNTER — ANESTHESIA (OUTPATIENT)
Dept: SURGERY | Facility: HOSPITAL | Age: 69
End: 2019-09-17
Payer: MEDICARE

## 2019-09-17 VITALS
BODY MASS INDEX: 30.31 KG/M2 | DIASTOLIC BLOOD PRESSURE: 81 MMHG | RESPIRATION RATE: 14 BRPM | OXYGEN SATURATION: 98 % | WEIGHT: 200 LBS | HEIGHT: 68 IN | SYSTOLIC BLOOD PRESSURE: 155 MMHG | TEMPERATURE: 98 F | HEART RATE: 58 BPM

## 2019-09-17 DIAGNOSIS — M25.551 CHRONIC HIP PAIN, BILATERAL: Primary | ICD-10-CM

## 2019-09-17 DIAGNOSIS — N13.30 HYDRONEPHROSIS OF RIGHT KIDNEY: ICD-10-CM

## 2019-09-17 DIAGNOSIS — N28.9 KIDNEY DISORDER: ICD-10-CM

## 2019-09-17 DIAGNOSIS — M25.552 CHRONIC HIP PAIN, BILATERAL: Primary | ICD-10-CM

## 2019-09-17 DIAGNOSIS — G89.29 CHRONIC HIP PAIN, BILATERAL: Primary | ICD-10-CM

## 2019-09-17 PROBLEM — M16.9 DEGENERATIVE JOINT DISEASE (DJD) OF HIP: Status: ACTIVE | Noted: 2019-09-17

## 2019-09-17 PROCEDURE — 63600175 PHARM REV CODE 636 W HCPCS: Performed by: STUDENT IN AN ORGANIZED HEALTH CARE EDUCATION/TRAINING PROGRAM

## 2019-09-17 PROCEDURE — 37000009 HC ANESTHESIA EA ADD 15 MINS: Performed by: UROLOGY

## 2019-09-17 PROCEDURE — 76000 FLUOROSCOPY <1 HR PHYS/QHP: CPT | Mod: 26,59,, | Performed by: UROLOGY

## 2019-09-17 PROCEDURE — D9220A PRA ANESTHESIA: ICD-10-PCS | Mod: CRNA,,, | Performed by: NURSE ANESTHETIST, CERTIFIED REGISTERED

## 2019-09-17 PROCEDURE — D9220A PRA ANESTHESIA: ICD-10-PCS | Mod: ANES,,, | Performed by: ANESTHESIOLOGY

## 2019-09-17 PROCEDURE — 71000015 HC POSTOP RECOV 1ST HR: Performed by: UROLOGY

## 2019-09-17 PROCEDURE — 76000 PR  FLUOROSCOPE EXAMINATION: ICD-10-PCS | Mod: 26,59,, | Performed by: UROLOGY

## 2019-09-17 PROCEDURE — 71000044 HC DOSC ROUTINE RECOVERY FIRST HOUR: Performed by: UROLOGY

## 2019-09-17 PROCEDURE — 37000008 HC ANESTHESIA 1ST 15 MINUTES: Performed by: UROLOGY

## 2019-09-17 PROCEDURE — 25000003 PHARM REV CODE 250: Performed by: UROLOGY

## 2019-09-17 PROCEDURE — C2617 STENT, NON-COR, TEM W/O DEL: HCPCS | Performed by: UROLOGY

## 2019-09-17 PROCEDURE — D9220A PRA ANESTHESIA: Mod: CRNA,,, | Performed by: NURSE ANESTHETIST, CERTIFIED REGISTERED

## 2019-09-17 PROCEDURE — 52332 PR CYSTOSCOPY,INSERT URETERAL STENT: ICD-10-PCS | Mod: RT,,, | Performed by: UROLOGY

## 2019-09-17 PROCEDURE — D9220A PRA ANESTHESIA: Mod: ANES,,, | Performed by: ANESTHESIOLOGY

## 2019-09-17 PROCEDURE — 63600175 PHARM REV CODE 636 W HCPCS: Performed by: NURSE ANESTHETIST, CERTIFIED REGISTERED

## 2019-09-17 PROCEDURE — 52332 CYSTOSCOPY AND TREATMENT: CPT | Mod: RT,,, | Performed by: UROLOGY

## 2019-09-17 PROCEDURE — 36000707: Performed by: UROLOGY

## 2019-09-17 PROCEDURE — C1758 CATHETER, URETERAL: HCPCS | Performed by: UROLOGY

## 2019-09-17 PROCEDURE — 36000706: Performed by: UROLOGY

## 2019-09-17 PROCEDURE — C1769 GUIDE WIRE: HCPCS | Performed by: UROLOGY

## 2019-09-17 PROCEDURE — 25000003 PHARM REV CODE 250: Performed by: NURSE ANESTHETIST, CERTIFIED REGISTERED

## 2019-09-17 DEVICE — STENT URETERAL UNIV 6FR 26CM: Type: IMPLANTABLE DEVICE | Site: URETER | Status: FUNCTIONAL

## 2019-09-17 RX ORDER — FENTANYL CITRATE 50 UG/ML
25 INJECTION, SOLUTION INTRAMUSCULAR; INTRAVENOUS EVERY 5 MIN PRN
Status: DISCONTINUED | OUTPATIENT
Start: 2019-09-17 | End: 2019-09-17 | Stop reason: HOSPADM

## 2019-09-17 RX ORDER — GLYCOPYRROLATE 0.2 MG/ML
INJECTION INTRAMUSCULAR; INTRAVENOUS
Status: DISCONTINUED | OUTPATIENT
Start: 2019-09-17 | End: 2019-09-17

## 2019-09-17 RX ORDER — CEFAZOLIN SODIUM 1 G/3ML
2 INJECTION, POWDER, FOR SOLUTION INTRAMUSCULAR; INTRAVENOUS
Status: COMPLETED | OUTPATIENT
Start: 2019-09-17 | End: 2019-09-17

## 2019-09-17 RX ORDER — FENTANYL CITRATE 50 UG/ML
INJECTION, SOLUTION INTRAMUSCULAR; INTRAVENOUS
Status: DISCONTINUED | OUTPATIENT
Start: 2019-09-17 | End: 2019-09-17

## 2019-09-17 RX ORDER — LIDOCAINE HCL/PF 100 MG/5ML
SYRINGE (ML) INTRAVENOUS
Status: DISCONTINUED | OUTPATIENT
Start: 2019-09-17 | End: 2019-09-17

## 2019-09-17 RX ORDER — ONDANSETRON 2 MG/ML
4 INJECTION INTRAMUSCULAR; INTRAVENOUS ONCE AS NEEDED
Status: DISCONTINUED | OUTPATIENT
Start: 2019-09-17 | End: 2019-09-17 | Stop reason: HOSPADM

## 2019-09-17 RX ORDER — SODIUM CHLORIDE 9 MG/ML
INJECTION, SOLUTION INTRAVENOUS CONTINUOUS
Status: DISCONTINUED | OUTPATIENT
Start: 2019-09-17 | End: 2019-09-17 | Stop reason: HOSPADM

## 2019-09-17 RX ORDER — MIDAZOLAM HYDROCHLORIDE 1 MG/ML
INJECTION, SOLUTION INTRAMUSCULAR; INTRAVENOUS
Status: DISCONTINUED | OUTPATIENT
Start: 2019-09-17 | End: 2019-09-17

## 2019-09-17 RX ORDER — PROPOFOL 10 MG/ML
VIAL (ML) INTRAVENOUS CONTINUOUS PRN
Status: DISCONTINUED | OUTPATIENT
Start: 2019-09-17 | End: 2019-09-17

## 2019-09-17 RX ORDER — KETAMINE HYDROCHLORIDE 100 MG/ML
INJECTION, SOLUTION INTRAMUSCULAR; INTRAVENOUS
Status: DISCONTINUED | OUTPATIENT
Start: 2019-09-17 | End: 2019-09-17

## 2019-09-17 RX ORDER — HYDROCODONE BITARTRATE AND ACETAMINOPHEN 5; 325 MG/1; MG/1
1 TABLET ORAL EVERY 4 HOURS PRN
Status: DISCONTINUED | OUTPATIENT
Start: 2019-09-17 | End: 2019-09-17 | Stop reason: HOSPADM

## 2019-09-17 RX ORDER — PROPOFOL 10 MG/ML
VIAL (ML) INTRAVENOUS
Status: DISCONTINUED | OUTPATIENT
Start: 2019-09-17 | End: 2019-09-17

## 2019-09-17 RX ORDER — LIDOCAINE HYDROCHLORIDE 10 MG/ML
INJECTION, SOLUTION EPIDURAL; INFILTRATION; INTRACAUDAL; PERINEURAL
Status: DISCONTINUED
Start: 2019-09-17 | End: 2019-09-17 | Stop reason: HOSPADM

## 2019-09-17 RX ORDER — LIDOCAINE HYDROCHLORIDE 20 MG/ML
JELLY TOPICAL
Status: DISCONTINUED | OUTPATIENT
Start: 2019-09-17 | End: 2019-09-17 | Stop reason: HOSPADM

## 2019-09-17 RX ORDER — ONDANSETRON 2 MG/ML
INJECTION INTRAMUSCULAR; INTRAVENOUS
Status: DISCONTINUED | OUTPATIENT
Start: 2019-09-17 | End: 2019-09-17

## 2019-09-17 RX ORDER — HYOSCYAMINE SULFATE 0.125 MG
125 TABLET ORAL EVERY 6 HOURS PRN
Qty: 10 TABLET | Refills: 0 | Status: SHIPPED | OUTPATIENT
Start: 2019-09-17 | End: 2019-09-17 | Stop reason: HOSPADM

## 2019-09-17 RX ADMIN — ONDANSETRON 4 MG: 2 INJECTION INTRAMUSCULAR; INTRAVENOUS at 06:09

## 2019-09-17 RX ADMIN — PROPOFOL 20 MG: 10 INJECTION, EMULSION INTRAVENOUS at 06:09

## 2019-09-17 RX ADMIN — LIDOCAINE HYDROCHLORIDE 100 MG: 20 INJECTION, SOLUTION INTRAVENOUS at 06:09

## 2019-09-17 RX ADMIN — SODIUM CHLORIDE, SODIUM GLUCONATE, SODIUM ACETATE, POTASSIUM CHLORIDE, MAGNESIUM CHLORIDE, SODIUM PHOSPHATE, DIBASIC, AND POTASSIUM PHOSPHATE: .53; .5; .37; .037; .03; .012; .00082 INJECTION, SOLUTION INTRAVENOUS at 07:09

## 2019-09-17 RX ADMIN — FENTANYL CITRATE 50 MCG: 50 INJECTION, SOLUTION INTRAMUSCULAR; INTRAVENOUS at 07:09

## 2019-09-17 RX ADMIN — PROPOFOL 50 MCG/KG/MIN: 10 INJECTION, EMULSION INTRAVENOUS at 06:09

## 2019-09-17 RX ADMIN — GLYCOPYRROLATE 0.2 MG: 0.2 INJECTION, SOLUTION INTRAMUSCULAR; INTRAVENOUS at 06:09

## 2019-09-17 RX ADMIN — KETAMINE HYDROCHLORIDE 25 MG: 100 INJECTION, SOLUTION, CONCENTRATE INTRAMUSCULAR; INTRAVENOUS at 06:09

## 2019-09-17 RX ADMIN — CEFAZOLIN 2 G: 330 INJECTION, POWDER, FOR SOLUTION INTRAMUSCULAR; INTRAVENOUS at 06:09

## 2019-09-17 RX ADMIN — FENTANYL CITRATE 50 MCG: 50 INJECTION, SOLUTION INTRAMUSCULAR; INTRAVENOUS at 06:09

## 2019-09-17 RX ADMIN — MIDAZOLAM HYDROCHLORIDE 2 MG: 1 INJECTION, SOLUTION INTRAMUSCULAR; INTRAVENOUS at 06:09

## 2019-09-17 RX ADMIN — SODIUM CHLORIDE: 0.9 INJECTION, SOLUTION INTRAVENOUS at 05:09

## 2019-09-17 RX ADMIN — KETAMINE HYDROCHLORIDE 25 MG: 100 INJECTION, SOLUTION, CONCENTRATE INTRAMUSCULAR; INTRAVENOUS at 07:09

## 2019-09-17 NOTE — ANESTHESIA POSTPROCEDURE EVALUATION
Anesthesia Post Evaluation    Patient: Eric Brown    Procedure(s) Performed: Procedure(s) (LRB):  REPLACEMENT, STENT (Right)  CYSTOSCOPY (N/A)    Final Anesthesia Type: general  Patient location during evaluation: PACU  Patient participation: Yes- Able to Participate  Level of consciousness: awake and alert and oriented  Post-procedure vital signs: reviewed and stable  Pain management: adequate  Airway patency: patent  PONV status at discharge: No PONV  Anesthetic complications: no      Cardiovascular status: blood pressure returned to baseline and hemodynamically stable  Respiratory status: unassisted, spontaneous ventilation and room air  Hydration status: euvolemic  Follow-up not needed.          Vitals Value Taken Time   /81 9/17/2019  8:33 AM   Temp 36.6 °C (97.8 °F) 9/17/2019  8:30 AM   Pulse 58 9/17/2019  8:35 AM   Resp 14 9/17/2019  8:34 AM   SpO2 97 % 9/17/2019  8:35 AM   Vitals shown include unvalidated device data.      No case tracking events are documented in the log.      Pain/Michael Score: Michael Score: 10 (9/17/2019  8:15 AM)

## 2019-09-17 NOTE — OP NOTE
[facility]  Operative Note    Date: 09/17/2019    Pre-Op Diagnosis: Right hydronephrosis  Patient Active Problem List    Diagnosis Date Noted    Hydronephrosis of right kidney 09/17/2019    Hypertensive cardiovascular-renal disease, stage 1-4 or unspecified chronic kidney disease, with heart failure 09/06/2019    Pulmonary hypertension 09/06/2019    Metastasis to bone 09/01/2019    Chronic diastolic congestive heart failure 09/01/2019    Nuclear sclerosis, bilateral 06/03/2019    Hydronephrosis 04/16/2019    Bladder tumor 03/11/2019    Other hydronephrosis 11/27/2018    Left hip pain 01/31/2018    Secondary adenocarcinoma of retroperitoneum 01/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  Cystoscopy with Right JJ ureteral stent exchange  2.  Fluoro < 1 h    Specimen(s): none    Staff Surgeon: Rodger Noguera MD    Assistant Surgeon: Cristopher Alonzo MD; Rodger Ordoñez MD    Anesthesia: Monitored Local Anesthesia with Sedation    Indications: Eric Brown is a 68 y.o. male with Right hydronephrosis, currently with a 6 x 26 Right double J ureteral stent currently in place x 5 months.  He presents for stent exchange today.      Findings:   - known papillary, low grade appearing, bladder mass visualized medial and posterior to the left ureteral orifice. The patient has been counseled and will continue observation for the tumor given his medical co-morbidities  - bullous edema noted near right ureteral orifice; likely due to stent  - difficulty advancing guide wire through area of ureteral narrowing; will plan to use angled-tip glide wire during next  exchange       Estimated Blood Loss: min    Drains:   1.  6 x 26 Right Double J ureteral stent without strings    Procedure in Detail:  After risks, benefits and possible complications of the procedure were explained, the patient elected to undergo the procedure and informed consent was obtained.  All questions were answered in the eligio-operative area. The patient was transferred to the cystoscopy suite and placed on the fluoroscopy table in the supine position.  SCDs were applied and working. Time out was performed, eligio-procedural antibiotics were given. Anesthesia was administered.  After adequate anesthesia the patient was placed in dorsal lithotomy position and prepped and draped in the usual sterile fashion.     A rigid cystoscope in a 22 Fr sheath was introduced into the patients bladder per urethra.  This passed easily.  The entire urethra was visualized and revealed no strictures or masses.  Cystoscopy was performed which showed the right and left ureteral orifices in the normal anatomic position effluxing clear urine.  A papillary, low-grade lesion was noted medial and posterior to the left ureteral orifice as before. There was bullous edema near the right ureteral orifice, likely edema from stent. No other masses were noted.There were  no bladder stones, no diverticuli and no trabeculations.      Our attention was turned to the patients right ureteral orifice which had a stent in place.  A motionwire was advanced up the right ureteral orifice alongside the stent; it was unable to be advanced past the midureter. We then used alligator graspers to grasp the stent and pulled it to the urethral meatus. A guidewire was unable to be advanced through the stent, so the stent was pulled out.  This was done under continuous fluoro to ensure the wire stayed in place.  We inspected the stent and it was found to be in tact.  The motion wire was able to be passed under live fluoroscopy to the level of the renal pelvis,  albeit with some difficulty. We then passed a 6 x 26 double J ureteral stent without strings over the wire to the level of the renal pelvis under direct vision as well as flouroscopy. The guide wire was removed. A 180 degree coil was observed in the renal pelvis as well as the bladder using fluoro.  A 180 degree coil was also seen using direct visualization in the bladder.      The patient's bladder was drained and the procedure was terminated.  The patient tolerated the procedure well and was transferred to the recovery room in stable condition.      Follow up care: The patient will follow up with Dr. Noguera in 4 weeks. The patient voiced understanding that the ureteral stent must be removed or changed and failure to do so can result in infection, pain, future procedures or loss of their kidney.    Cristopher Alonzo MD

## 2019-09-17 NOTE — TRANSFER OF CARE
"Anesthesia Transfer of Care Note    Patient: Eric Brown    Procedure(s) Performed: Procedure(s) (LRB):  REPLACEMENT, STENT (Right)  CYSTOSCOPY (N/A)    Patient location: PACU    Anesthesia Type: general    Transport from OR: Transported from OR on 2-3 L/min O2 by NC with adequate spontaneous ventilation    Post pain: adequate analgesia    Post assessment: no apparent anesthetic complications and tolerated procedure well    Post vital signs: stable    Level of consciousness: sedated and responds to stimulation    Nausea/Vomiting: no nausea/vomiting    Complications: none    Transfer of care protocol was followed      Last vitals:   Visit Vitals  BP (!) 150/70 (BP Location: Left arm, Patient Position: Lying)   Pulse 66   Temp (P) 36.2 °C (97.2 °F) (Temporal)   Resp (P) 12   Ht 5' 7.5" (1.715 m)   Wt 90.7 kg (200 lb)   SpO2 (!) 94%   BMI 30.86 kg/m²     "

## 2019-09-17 NOTE — ANESTHESIA PREPROCEDURE EVALUATION
09/17/2019  Eric Brown is a 68 y.o., male.    Anesthesia Evaluation    I have reviewed the Patient Summary Reports.     I have reviewed the Medications.     Review of Systems  Anesthesia Hx:  No problems with previous Anesthesia Denies Hx of Anesthetic complications  History of prior surgery of interest to airway management or planning:  Denies Personal Hx of Anesthesia complications.   Social:  Former Smoker, No Alcohol Use    Hematology/Oncology:         -- Cancer in past history:   EENT/Dental:EENT/Dental Normal   Cardiovascular:   Hypertension Denies MI.  Denies CAD.   Dysrhythmias atrial fibrillation  Denies Angina. CHF hyperlipidemia        Pulmonary:   Denies Shortness of breath.    Renal/:   renal calculi    Musculoskeletal:  Musculoskeletal Normal    Neurological:  Neurology Normal    Endocrine:  Endocrine Normal    Dermatological:  Skin Normal    Psych:  Psychiatric Normal           Physical Exam  General:  Well nourished    Airway/Jaw/Neck:  Airway Findings: Mouth Opening: Normal Tongue: Normal  General Airway Assessment: Adult  Mallampati: I      Dental:  Dental Findings: In tact   Chest/Lungs:  Chest/Lungs Findings: Clear to auscultation, Normal Respiratory Rate     Heart/Vascular:  Heart Findings: Rate: Normal  Rhythm: Regular Rhythm  Sounds: Normal        Mental Status:  Mental Status Findings:  Cooperative, Alert and Oriented         Anesthesia Plan  Type of Anesthesia, risks & benefits discussed:  Anesthesia Type:  general, MAC  Patient's Preference:   Intra-op Monitoring Plan: standard ASA monitors  Intra-op Monitoring Plan Comments:   Post Op Pain Control Plan: multimodal analgesia  Post Op Pain Control Plan Comments:   Induction:   IV  Beta Blocker:  Patient is not currently on a Beta-Blocker (No further documentation required).       Informed Consent: Patient understands risks  and agrees with Anesthesia plan.  Questions answered.   ASA Score: 3     Day of Surgery Review of History & Physical:    H&P update referred to the surgeon.         Ready For Surgery From Anesthesia Perspective.

## 2019-09-17 NOTE — DISCHARGE INSTRUCTIONS
Recovery After Procedural Sedation (Adult)  You have been given medicine by vein to make you sleep during your surgery. This may have included both a pain medicine and sleeping medicine. Most of the effects have worn off. But you may still have some drowsiness for the next 6 to 8 hours.  Home care  Follow these guidelines when you get home:  · For the next 8 hours, you should be watched by a responsible adult. This person should make sure your condition is not getting worse.  · Don't drink any alcohol for the next 24 hours.  · Don't drive, operate dangerous machinery, or make important business or personal decisions during the next 24 hours.  Note: Your healthcare provider may tell you not to take any medicine by mouth for pain or sleep in the next 4 hours. These medicines may react with the medicines you were given in the hospital. This could cause a much stronger response than usual.  Follow-up care  Follow up with your healthcare provider if you are not alert and back to your usual level of activity within 12 hours.  When to seek medical advice  Call your healthcare provider right away if any of these occur:  · Drowsiness gets worse  · Weakness or dizziness gets worse  · Repeated vomiting  · You can't be awakened   Date Last Reviewed: 10/18/2016  © 8509-6097 [employer]. [address] [place] [address], [place] [address]. All rights reserved. This information is not intended as a substitute for professional medical care. Always follow your healthcare professional's instructions.    Ureteral Stents  A ureteral stent is a soft plastic tube with holes in it. Its temporarily inserted into a ureter to help drain urine into the bladder. One end goes in the kidney. The other end goes in the bladder. A coil on each end holds the stent in place. The stent cant be seen from outside the body. It shouldnt interfere with your normal routine. Your stent will be put in by a doctor trained in treating the urinary  tract (a urologist) or another specialist. The procedure is done in a hospital or surgery center. Youll likely go home the same day.  When is a ureteral stent used?  A ureteral stent may be used:  · To bypass a blockage in a kidney or ureter.  · During kidney stone removal.  · To let a ureter heal after surgery.    Before the Procedure  Your healthcare provider will give you instructions to prepare for the procedure. X-rays or other imaging tests of your kidneys and ureters may be done beforehand.  During the procedure  · You receive medicine to prevent pain and help you relax or sleep during the procedure. Once this takes effect, the procedure starts.  · The doctor inserts a cystoscope (lighted instrument) through the urethra and into the bladder. This shows the opening to the ureter.  · A thin wire is carefully threaded through the cystoscope, up the ureter, and into the kidney. The stent is inserted over the wire.  · A fluoroscope (special X-ray machine) is used to help position the stent. When the stent is in place, the wire and cystoscope are removed.  While you have a stent  · Some discomfort is normal. Certain movements may trigger pain or a feeling that you need to urinate. You may also feel mild soreness or pressure before or during urination. These symptoms will go away a few days after the stent is removed.  · Medicine to control pain or bladder spasms or to prevent infection may be prescribed. Take this as directed.  · Drink plenty of fluids to help flush out your urinary tract.  · Your urine may be slightly pink or red. This is due to bleeding caused by minor irritation from the stent. This may happen on and off while you have the stent.  · As with any synthetic device placed in the body, there is a risk of infection. The stent may have to be removed if this happens.   How long will you need a stent?  The stent is often taken out after the blockage in the ureter is treated or the ureter has healed.  This may take 1 week to 2 weeks, or longer. If a stent is needed for a long time, it may need to be changed every few months.  When to call your healthcare provider  Contact your healthcare provider right away if:  · Your urine contains blood clots or you see a large amount of blood-tinged urine  · You have symptoms similar to those you had before the stent was placed  · You constantly leak urine  · You have a fever over 100.4°F (38°C), chills, nausea, or vomiting  · Your pain is not relieved with medicine  · The end of the stent comes out of the urethra   Date Last Reviewed: 1/1/2017 © 2000-2017 [employer]. [address] [place] [address], [place] [address]. All rights reserved. This information is not intended as a substitute for professional medical care. Always follow your healthcare professional's instructions.        Cystoscopy    Cystoscopy is a procedure that lets your doctor look directly inside your urethra and bladder. It can be used to:  · Help diagnose a problem with your urethra, bladder, or kidneys.  · Take a sample (biopsy) of bladder or urethral tissue.  · Treat certain problems (such as removing kidney stones).  · Place a stent to bypass an obstruction.  · Take special X-rays of the kidneys.  Based on the findings, your doctor may recommend other tests or treatments.  What is a cystoscope?  A cystoscope is a telescope-like instrument that contains lenses and fiberoptics (small glass wires that make bright light). The cystoscope may be straight and rigid, or flexible to bend around curves in the urethra. The doctor may look directly into the cystoscope, or project the image onto a monitor.  Getting ready  · Ask your doctor if you should stop taking any medicines before the procedure.  · Ask whether you should avoid eating or drinking anything after midnight before the procedure.  · Follow any other instructions your doctor gives you.  Tell your doctor before the exam if you:  · Take any  medicines, such as aspirin or blood thinners  · Have allergies to any medicines  · Are pregnant   The procedure  Cystoscopy is done in the doctors office, surgery center, or hospital. The doctor and a nurse are present during the procedure. It takes only a few minutes, longer if a biopsy, X-ray, or treatment needs to be done.  During the procedure:  · You lie on an exam table on your back, knees bent and legs apart. You are covered with a drape.  · Your urethra and the area around it are washed. Anesthetic jelly may be applied to numb the urethra. Other pain medicine is usually not needed. In some cases, you may be offered a mild sedative to help you relax. If a more extensive procedure is to be done, such as a biopsy or kidney stone removal, general anesthesia may be needed.  · The cystoscope is inserted. A sterile fluid is put into the bladder to expand it. You may feel pressure from this fluid.  · When the procedure is done, the cystoscope is removed.  After the procedure  If you had a sedative, general anesthesia, or spinal anesthesia, you must have someone drive you home. Once youre home:  · Drink plenty of fluids.  · You may have burning or light bleeding when you urinate--this is normal.  · Medicines may be prescribed to ease any discomfort or prevent infection. Take these as directed.  · Call your doctor if you have heavy bleeding or blood clots, burning that lasts more than a day, a fever over 100°F  (38° C), or trouble urinating.  Date Last Reviewed: 1/1/2017  © 4100-9613 [employer]. [address] [place] [address], [place] [address]. All rights reserved. This information is not intended as a substitute for professional medical care. Always follow your healthcare professional's instructions.

## 2019-09-17 NOTE — PLAN OF CARE
D/c instructions reviewed with patient and spouse. Follow-up discussed. Meets PADSS criteria for d/c. Able to void spontaneously, meets D/C order criteria.

## 2019-09-17 NOTE — DISCHARGE SUMMARY
[facility]  Discharge Note  Short Stay    Admit Date: 9/17/2019    Discharge Date and Time: 09/17/2019 7:27 AM      Attending Physician: Rodger Noguera MD     Discharge Provider: Cristopher Alonzo    Diagnoses:  Active Hospital Problems    Diagnosis  POA    Hydronephrosis of right kidney [N13.30]  Yes      Resolved Hospital Problems   No resolved problems to display.       Discharged Condition: good    Hospital Course: Patient was admitted for cystoscopy with right ureteral stent exchange and tolerated the procedure well with no complications. The patient was discharged home in good condition on the same day.       Final Diagnoses: Same as principal problem.    Disposition: Home or Self Care    Follow up/Patient Instructions:    Medications:  Reconciled Home Medications:   Current Discharge Medication List      CONTINUE these medications which have NOT CHANGED    Details   allopurinol (ZYLOPRIM) 100 MG tablet TAKE 1 TABLET (100 MG TOTAL) BY MOUTH ONCE DAILY.  Qty: 90 tablet, Refills: 3      bumetanide (BUMEX) 1 MG tablet Take 1 tablet (1 mg total) by mouth 2 (two) times daily.  Qty: 60 tablet, Refills: 1    Associated Diagnoses: Acute on chronic diastolic congestive heart failure      cholecalciferol, vitamin D3, (VITAMIN D3) 1,000 unit capsule Take 5,000 mg by mouth.      fentaNYL (DURAGESIC) 100 mcg/hr Place 1 patch onto the skin every 48 hours.  Qty: 15 patch, Refills: 0    Associated Diagnoses: Neoplasm related pain      HYDROmorphone (DILAUDID) 2 MG tablet Take 1 tablet (2 mg total) by mouth every 4 (four) hours as needed for Pain (Take if Oxycodone does not work).  Qty: 90 tablet, Refills: 0    Comments: Quantity prescribed more than 7 day supply? Yes, quantity medically necessary  Associated Diagnoses: Neoplasm related pain      methadone (DOLOPHINE) 5 MG tablet Take 2 tablets (10 mg total) by mouth every 12 (twelve) hours.  Qty: 120 tablet, Refills: 0    Comments: Quantity prescribed more than 7  day supply? Yes, quantity medically necessary  Associated Diagnoses: Intrahepatic cholangiocarcinoma; Palliative care by specialist      metoprolol succinate (TOPROL-XL) 100 MG 24 hr tablet Take 1 tablet (100 mg total) by mouth once daily.  Qty: 60 tablet, Refills: 6    Associated Diagnoses: Essential hypertension      multivit-min-FA-lycopen-lutein 0.4-300-250 mg-mcg-mcg Tab Take by mouth.      omeprazole (PRILOSEC) 20 MG capsule Take 1 capsule (20 mg total) by mouth once daily.  Qty: 90 capsule, Refills: 3      ondansetron (ZOFRAN) 8 MG tablet Take 1 tablet (8 mg total) by mouth 4 (four) times daily as needed for Nausea.  Qty: 60 tablet, Refills: 2    Associated Diagnoses: Intrahepatic cholangiocarcinoma      oxyCODONE (ROXICODONE) 10 mg Tab immediate release tablet Take 1 tablet (10 mg total) by mouth every 4 (four) hours as needed for Pain. More than 7 days is medically necessary  Qty: 120 tablet, Refills: 0    Comments: Quantity prescribed more than 7 day supply? Yes, quantity medically necessary  Associated Diagnoses: Neoplasm related pain      predniSONE (DELTASONE) 5 MG tablet Take 2 tablets (10 mg total) by mouth once daily. for 10 days  Qty: 30 tablet, Refills: 0    Associated Diagnoses: Intrahepatic cholangiocarcinoma; Palliative care by specialist      promethazine (PHENERGAN) 25 MG tablet Take 25 mg by mouth every 6 (six) hours as needed.  Refills: 7      tamsulosin (FLOMAX) 0.4 mg Cap Take 1 capsule (0.4 mg total) by mouth once daily.  Qty: 60 capsule, Refills: 6    Associated Diagnoses: Benign prostatic hyperplasia with urinary obstruction      naloxone (NARCAN) 4 mg/actuation Spry Inhale 1 spray (4 mg total) by Nasal route once for 1 dose.  Qty: 2 each, Refills: 0    Associated Diagnoses: Chronic, continuous use of opioids           Discharge Procedure Orders   Diet general     Call MD for:  extreme fatigue     Call MD for:  persistent dizziness or light-headedness     Call MD for:  hives     Call MD  for:  redness, tenderness, or signs of infection (pain, swelling, redness, odor or green/yellow discharge around incision site)     Call MD for:  difficulty breathing, headache or visual disturbances     Call MD for:  severe uncontrolled pain     Call MD for:  persistent nausea and vomiting     Call MD for:  temperature >100.4     Follow-up Information     Rodger Noguera MD In 4 months.    Specialty:  Urology  Contact information:  [address]  [facility] [address] [phone]

## 2019-09-17 NOTE — INTERVAL H&P NOTE
The patient has been examined and the H&P has been reviewed:    I concur with the findings and no changes have occurred since H&P was written.   Urine dipstick today positive for blood, negative for nitrites and LE    Anesthesia/Surgery risks, benefits and alternative options discussed and understood by patient/family.          Active Hospital Problems    Diagnosis  POA    Hydronephrosis of right kidney [N13.30]  Yes      Resolved Hospital Problems   No resolved problems to display.

## 2019-09-18 PROCEDURE — 77417 THER RADIOLOGY PORT IMAGE(S): CPT | Performed by: RADIOLOGY

## 2019-09-18 PROCEDURE — 77301 RADIOTHERAPY DOSE PLAN IMRT: CPT | Mod: TC | Performed by: RADIOLOGY

## 2019-09-18 PROCEDURE — 77301 RADIOTHERAPY DOSE PLAN IMRT: CPT | Mod: 26,,, | Performed by: RADIOLOGY

## 2019-09-18 PROCEDURE — 77386 HC IMRT, COMPLEX: CPT | Performed by: RADIOLOGY

## 2019-09-18 PROCEDURE — 77301 PR  INTEN MOD RADIOTHER PLAN W/DOSE VOL HIST: ICD-10-PCS | Mod: 26,,, | Performed by: RADIOLOGY

## 2019-09-18 NOTE — PROGRESS NOTES
Subjective:       Patient ID: Eric Brown is a 68 y.o. male.    Chief Complaint: Bone Cancer    This patient returns today for discussion of further palliative radiotherapy.     Mr. Brown has a history of metastatic cholangiocarcinoma.  He initially presented in  6/2012 when he underwent a diistal pancreatectomy for a PNET and a liver resection for a T2N0M0 intrahepatic cholangiocarcinoma.  Unfortunately had a recurrence of the cholangiocarcinoma and underwent a repeat resection followed by adjuvant chemotherapy with cisplatin and gemcitabine - 6 cycles.  He did well until PET scan on 1/3/18 showed new disease and subsequent biopsy of soft tissue of peritoneum revealed metastatic cancer compatible with mets from cholangiocarcinoma  He completed 3 cycles of Cisplatin and Gemzar until 3/18.  He then started Protocol with  4/2018 - 12/2018. In Jan 2019, he developed progression of disease and was placed on PARP inhibitor trial Niraparib since 3/1/19 at [facility], last 5/22/19, stopped due to progression of disease.  The patient was referred to our department and completed a course of palliative radiotherpay to the Lt. ischium and Rt. acetabulum.  The patient now presents with further pain.      Review of Systems   Constitutional: Positive for activity change. Negative for appetite change, chills and fatigue.   Musculoskeletal: Positive for back pain and gait problem.       Objective:      Physical Exam   Constitutional: He is oriented to person, place, and time. He appears well-developed and well-nourished. No distress.   Neurological: He is alert and oriented to person, place, and time.   Psychiatric: He has a normal mood and affect. Judgment normal.       Assessment:       1. Cholangiocarcinoma    2. Metastasis to bone        Plan:       Will plan further radiotherapy to the pelvis and lumbar spine.

## 2019-09-19 ENCOUNTER — DOCUMENTATION ONLY (OUTPATIENT)
Dept: RADIATION ONCOLOGY | Facility: CLINIC | Age: 69
End: 2019-09-19

## 2019-09-19 ENCOUNTER — OFFICE VISIT (OUTPATIENT)
Dept: TRANSPLANT | Facility: CLINIC | Age: 69
End: 2019-09-19
Attending: INTERNAL MEDICINE
Payer: MEDICARE

## 2019-09-19 ENCOUNTER — LAB VISIT (OUTPATIENT)
Dept: LAB | Facility: HOSPITAL | Age: 69
End: 2019-09-19
Attending: INTERNAL MEDICINE
Payer: MEDICARE

## 2019-09-19 VITALS
SYSTOLIC BLOOD PRESSURE: 149 MMHG | BODY MASS INDEX: 31.81 KG/M2 | DIASTOLIC BLOOD PRESSURE: 83 MMHG | WEIGHT: 209.88 LBS | HEIGHT: 68 IN | HEART RATE: 65 BPM

## 2019-09-19 DIAGNOSIS — I13.0 HYPERTENSIVE CARDIOVASCULAR-RENAL DISEASE, STAGE 1-4 OR UNSPECIFIED CHRONIC KIDNEY DISEASE, WITH HEART FAILURE: ICD-10-CM

## 2019-09-19 DIAGNOSIS — C22.1 INTRAHEPATIC CHOLANGIOCARCINOMA: ICD-10-CM

## 2019-09-19 DIAGNOSIS — I50.32 CHRONIC DIASTOLIC CONGESTIVE HEART FAILURE: ICD-10-CM

## 2019-09-19 DIAGNOSIS — C79.51 METASTASIS TO BONE: ICD-10-CM

## 2019-09-19 DIAGNOSIS — N18.30 STAGE 3 CHRONIC KIDNEY DISEASE: ICD-10-CM

## 2019-09-19 DIAGNOSIS — C78.6 SECONDARY ADENOCARCINOMA OF RETROPERITONEUM: ICD-10-CM

## 2019-09-19 DIAGNOSIS — I50.33 ACUTE ON CHRONIC DIASTOLIC CONGESTIVE HEART FAILURE: ICD-10-CM

## 2019-09-19 DIAGNOSIS — I27.20 PULMONARY HYPERTENSION: ICD-10-CM

## 2019-09-19 DIAGNOSIS — I50.32 CHRONIC DIASTOLIC CONGESTIVE HEART FAILURE: Primary | ICD-10-CM

## 2019-09-19 LAB
ANION GAP SERPL CALC-SCNC: 8 MMOL/L (ref 8–16)
BUN SERPL-MCNC: 36 MG/DL (ref 8–23)
CALCIUM SERPL-MCNC: 9.2 MG/DL (ref 8.7–10.5)
CHLORIDE SERPL-SCNC: 94 MMOL/L (ref 95–110)
CO2 SERPL-SCNC: 34 MMOL/L (ref 23–29)
CREAT SERPL-MCNC: 1.5 MG/DL (ref 0.5–1.4)
EST. GFR  (AFRICAN AMERICAN): 54.1 ML/MIN/1.73 M^2
EST. GFR  (NON AFRICAN AMERICAN): 46.8 ML/MIN/1.73 M^2
GLUCOSE SERPL-MCNC: 156 MG/DL (ref 70–110)
POTASSIUM SERPL-SCNC: 4.3 MMOL/L (ref 3.5–5.1)
SODIUM SERPL-SCNC: 136 MMOL/L (ref 136–145)

## 2019-09-19 PROCEDURE — 99214 PR OFFICE/OUTPT VISIT, EST, LEVL IV, 30-39 MIN: ICD-10-PCS | Mod: S$GLB,,, | Performed by: INTERNAL MEDICINE

## 2019-09-19 PROCEDURE — G6002 STEREOSCOPIC X-RAY GUIDANCE: HCPCS | Mod: 26,,, | Performed by: RADIOLOGY

## 2019-09-19 PROCEDURE — 80048 BASIC METABOLIC PNL TOTAL CA: CPT

## 2019-09-19 PROCEDURE — 77300 PR RADIATION THERAPY,DOSIMETRY PLAN: ICD-10-PCS | Mod: 26,,, | Performed by: RADIOLOGY

## 2019-09-19 PROCEDURE — 77300 RADIATION THERAPY DOSE PLAN: CPT | Mod: 26,,, | Performed by: RADIOLOGY

## 2019-09-19 PROCEDURE — 36415 COLL VENOUS BLD VENIPUNCTURE: CPT

## 2019-09-19 PROCEDURE — 77338 DESIGN MLC DEVICE FOR IMRT: CPT | Mod: 26,,, | Performed by: RADIOLOGY

## 2019-09-19 PROCEDURE — G6002 PR STEREOSCOPIC XRAY GUIDE FOR RADIATION TX DELIV: ICD-10-PCS | Mod: 26,,, | Performed by: RADIOLOGY

## 2019-09-19 PROCEDURE — 99999 PR PBB SHADOW E&M-EST. PATIENT-LVL IV: CPT | Mod: PBBFAC,,, | Performed by: INTERNAL MEDICINE

## 2019-09-19 PROCEDURE — 77338 PR  MLC IMRT DESIGN & CONSTRUCTION PER IMRT PLAN: ICD-10-PCS | Mod: 26,,, | Performed by: RADIOLOGY

## 2019-09-19 PROCEDURE — 77300 RADIATION THERAPY DOSE PLAN: CPT | Mod: TC | Performed by: RADIOLOGY

## 2019-09-19 PROCEDURE — 99999 PR PBB SHADOW E&M-EST. PATIENT-LVL IV: ICD-10-PCS | Mod: PBBFAC,,, | Performed by: INTERNAL MEDICINE

## 2019-09-19 PROCEDURE — 77338 DESIGN MLC DEVICE FOR IMRT: CPT | Mod: TC,59 | Performed by: RADIOLOGY

## 2019-09-19 PROCEDURE — 77386 HC IMRT, COMPLEX: CPT | Performed by: RADIOLOGY

## 2019-09-19 PROCEDURE — 99214 OFFICE O/P EST MOD 30 MIN: CPT | Mod: S$GLB,,, | Performed by: INTERNAL MEDICINE

## 2019-09-19 RX ORDER — BUMETANIDE 1 MG/1
2 TABLET ORAL 2 TIMES DAILY
Qty: 120 TABLET | Refills: 1 | Status: SHIPPED | OUTPATIENT
Start: 2019-09-19 | End: 2020-09-18

## 2019-09-19 RX ORDER — POTASSIUM CHLORIDE 750 MG/1
10 TABLET, EXTENDED RELEASE ORAL 2 TIMES DAILY
Qty: 60 TABLET | Refills: 3
Start: 2019-09-19 | End: 2019-10-24

## 2019-09-19 NOTE — PROGRESS NOTES
Subjective:     Patient ID:  Eric Brown is a 68 y.o. male who presents for follow-up of Congestive Heart Failure    HPI:  67 yo male seen 1 week ago (see note) with ADHFpEF and PH (most likely WHO group 2).  I treated him with bumetanide 1 mg BID and KCl 10 meq BID until BMP returned 9/3 with mildly elevated K 5.2.  At that time KCL was stopped.  When seen 9/16/19 he was on bumetanide 1 mg twice daily and reported losing 11 lb.  At that visit I stopped plendil (he was on 5 mg BID) and asked that he watch BP.  He reports also had him reduce bumex to 1 mg qd.  Yesterday he reports started taking BUmex 1 mg BID and KCl 10 meq nightly.    He returns today and reports that he has stopped the immunotherapy at [facility] as was not working.  On Monday started on prednisone 10 mg qd, on Tuesday had 1 liter of IV fluid (per patient) during procedure to change ureteral stent on Tuesday and also pain management more steroid via injection.  He has some shortness of breath but is not sure that it is any worse even with the weight gain.  The edema and abdominal distention do appear worse to him.    Review of Systems   Constitution: Positive for malaise/fatigue and weight gain (Difficult to tell as he weighs a lot in the afternoon but he feel that he gained at least 5 lb since his visit with me). Negative for chills and fever.   Cardiovascular: Positive for dyspnea on exertion, leg swelling and orthopnea. Negative for chest pain, near-syncope, palpitations, paroxysmal nocturnal dyspnea and syncope.   Respiratory: Negative for cough, sputum production and wheezing.    Hematologic/Lymphatic: Does not bruise/bleed easily.   Musculoskeletal: Positive for back pain and joint pain.   Gastrointestinal: Positive for bloating and constipation.   Genitourinary: Positive for frequency.   Neurological: Positive for weakness. Negative for dizziness and light-headedness.      Objective:   Physical Exam   Constitutional: He is oriented  "to person, place, and time. No distress.   BP (!) 149/83 (BP Location: Right arm, Patient Position: Sitting, BP Method: Medium (Automatic))   Pulse 65   Ht 5' 8" (1.727 m)   Wt 95.2 kg (209 lb 14.1 oz)   BMI 31.91 kg/m²   He was not weighed at last visit as too uncomfortable  Original visit wt 97.3 kg (214 lb 8.1 oz)  Friendly, cooperative, thin male in NAD  He tolerated it getting onto the table very well today with the pain medication changes made earlier   HENT:   Head: Normocephalic and atraumatic.   Eyes: Conjunctivae are normal. Right eye exhibits no discharge. Left eye exhibits no discharge. No scleral icterus.   Neck: JVD (14-16 cm water ) present. No thyromegaly present.   Cardiovascular: Normal rate and regular rhythm. Exam reveals no gallop and no friction rub.   Murmur (Grade 1/6 systolic murmur LLSB. RLSB and apex) heard.  Pulmonary/Chest: Effort normal.   Diminished breath sounds bases worse on the right   Abdominal: Soft. He exhibits distension (Hepatomegaly). There is no tenderness. There is no rebound and no guarding.   Musculoskeletal: He exhibits edema (3+). He exhibits no tenderness.   Neurological: He is alert and oriented to person, place, and time.   Skin: Skin is warm and dry. He is not diaphoretic.   Psychiatric: He has a normal mood and affect. His behavior is normal. Judgment and thought content normal.      Lab Results   Component Value Date     09/19/2019    K 4.3 09/19/2019    CL 94 (L) 09/19/2019    CO2 34 (H) 09/19/2019    BUN 36 (H) 09/19/2019    CREATININE 1.5 (H) 09/19/2019    CALCIUM 9.2 09/19/2019    ANIONGAP 8 09/19/2019    ESTGFRAFRICA 54.1 (A) 09/19/2019    EGFRNONAA 46.8 (A) 09/19/2019       Assessment:     1. Chronic diastolic congestive heart failure    2. Hypertensive cardiovascular-renal disease, stage 1-4 or unspecified chronic kidney disease, with heart failure    3. Stage 3 chronic kidney disease    4. Pulmonary hypertension    5. Intrahepatic " cholangiocarcinoma    6. Secondary adenocarcinoma of retroperitoneum    7. Metastasis to bone      Plan:   Change bumex to 2 mg BID for now as off plendil so room with BP. Take KCL 10 meq BID   Purpose of diuresis in his case is solely for comfort and will aim to remove as much fluid as his blood pressure and renal function will allow unless limited by dizziness and/or lightheadedness  BMP Monday  I will arrange for HH   When the patient went to the restroom I spoke with the wife who informed me that physicians did speak with him about entering hospice.  At this time they are not ready to consider that.  I did not have any direct conversation with the patient about hospice.  Return to clinic in 4-6 weeks.  I will work him in sooner if the need arises.  I reviewed his case with heart failure nurse

## 2019-09-19 NOTE — PLAN OF CARE
Problem: Adult Inpatient Plan of Care  Goal: Plan of Care Review  Outcome: Ongoing (interventions implemented as appropriate)  Day 1 of outpatient XRT to the left hip. Nursing education done.

## 2019-09-19 NOTE — PATIENT INSTRUCTIONS
Change bumetanide to 2 mg (take 2 of the 1 mg pills) twice a day  Take potassium 10 meq one tablet twice a day  Lab Monday--if home health not yet set up go to any [facility] lab

## 2019-09-20 ENCOUNTER — TELEPHONE (OUTPATIENT)
Dept: HEMATOLOGY/ONCOLOGY | Facility: CLINIC | Age: 69
End: 2019-09-20

## 2019-09-20 ENCOUNTER — LAB VISIT (OUTPATIENT)
Dept: LAB | Facility: HOSPITAL | Age: 69
End: 2019-09-20
Attending: INTERNAL MEDICINE
Payer: MEDICARE

## 2019-09-20 ENCOUNTER — DOCUMENTATION ONLY (OUTPATIENT)
Dept: TRANSPLANT | Facility: HOSPITAL | Age: 69
End: 2019-09-20

## 2019-09-20 ENCOUNTER — TELEPHONE (OUTPATIENT)
Dept: TRANSPLANT | Facility: HOSPITAL | Age: 69
End: 2019-09-20

## 2019-09-20 DIAGNOSIS — C22.1 INTRAHEPATIC CHOLANGIOCARCINOMA: ICD-10-CM

## 2019-09-20 DIAGNOSIS — C22.1 INTRAHEPATIC CHOLANGIOCARCINOMA: Primary | ICD-10-CM

## 2019-09-20 LAB
ABO + RH BLD: NORMAL
ALBUMIN SERPL BCP-MCNC: 2.9 G/DL (ref 3.5–5.2)
ALP SERPL-CCNC: 117 U/L (ref 55–135)
ALT SERPL W/O P-5'-P-CCNC: 37 U/L (ref 10–44)
ANION GAP SERPL CALC-SCNC: 11 MMOL/L (ref 8–16)
AST SERPL-CCNC: 35 U/L (ref 10–40)
BILIRUB SERPL-MCNC: 0.6 MG/DL (ref 0.1–1)
BLD GP AB SCN CELLS X3 SERPL QL: NORMAL
BUN SERPL-MCNC: 37 MG/DL (ref 8–23)
CALCIUM SERPL-MCNC: 9.4 MG/DL (ref 8.7–10.5)
CHLORIDE SERPL-SCNC: 94 MMOL/L (ref 95–110)
CO2 SERPL-SCNC: 33 MMOL/L (ref 23–29)
CREAT SERPL-MCNC: 1.6 MG/DL (ref 0.5–1.4)
ERYTHROCYTE [DISTWIDTH] IN BLOOD BY AUTOMATED COUNT: 17.2 % (ref 11.5–14.5)
EST. GFR  (AFRICAN AMERICAN): 50.1 ML/MIN/1.73 M^2
EST. GFR  (NON AFRICAN AMERICAN): 43.3 ML/MIN/1.73 M^2
GLUCOSE SERPL-MCNC: 198 MG/DL (ref 70–110)
HCT VFR BLD AUTO: 30.3 % (ref 40–54)
HGB BLD-MCNC: 9 G/DL (ref 14–18)
IMM GRANULOCYTES # BLD AUTO: 0.12 K/UL (ref 0–0.04)
MCH RBC QN AUTO: 26 PG (ref 27–31)
MCHC RBC AUTO-ENTMCNC: 29.7 G/DL (ref 32–36)
MCV RBC AUTO: 88 FL (ref 82–98)
NEUTROPHILS # BLD AUTO: 12.3 K/UL (ref 1.8–7.7)
PLATELET # BLD AUTO: 393 K/UL (ref 150–350)
PMV BLD AUTO: 9.3 FL (ref 9.2–12.9)
POTASSIUM SERPL-SCNC: 4 MMOL/L (ref 3.5–5.1)
PROT SERPL-MCNC: 7.6 G/DL (ref 6–8.4)
RBC # BLD AUTO: 3.46 M/UL (ref 4.6–6.2)
SODIUM SERPL-SCNC: 138 MMOL/L (ref 136–145)
WBC # BLD AUTO: 14.3 K/UL (ref 3.9–12.7)

## 2019-09-20 PROCEDURE — G6002 PR STEREOSCOPIC XRAY GUIDE FOR RADIATION TX DELIV: ICD-10-PCS | Mod: 26,,, | Performed by: RADIOLOGY

## 2019-09-20 PROCEDURE — 77386 HC IMRT, COMPLEX: CPT | Performed by: RADIOLOGY

## 2019-09-20 PROCEDURE — 77387 GUIDANCE FOR RADJ TX DLVR: CPT | Mod: TC | Performed by: RADIOLOGY

## 2019-09-20 PROCEDURE — 77412 RADIATION TX DELIVERY LVL 3: CPT | Performed by: RADIOLOGY

## 2019-09-20 PROCEDURE — 86850 RBC ANTIBODY SCREEN: CPT

## 2019-09-20 PROCEDURE — G6002 STEREOSCOPIC X-RAY GUIDANCE: HCPCS | Mod: 26,,, | Performed by: RADIOLOGY

## 2019-09-20 PROCEDURE — 85027 COMPLETE CBC AUTOMATED: CPT

## 2019-09-20 PROCEDURE — 80053 COMPREHEN METABOLIC PANEL: CPT

## 2019-09-20 RX ORDER — ACETAMINOPHEN 325 MG/1
650 TABLET ORAL
Status: CANCELLED | OUTPATIENT
Start: 2019-09-20

## 2019-09-20 RX ORDER — DIPHENHYDRAMINE HCL 25 MG
25 CAPSULE ORAL
Status: CANCELLED | OUTPATIENT
Start: 2019-09-20

## 2019-09-20 RX ORDER — HYDROCODONE BITARTRATE AND ACETAMINOPHEN 500; 5 MG/1; MG/1
TABLET ORAL ONCE
Status: CANCELLED | OUTPATIENT
Start: 2019-09-20 | End: 2019-09-20

## 2019-09-20 NOTE — TELEPHONE ENCOUNTER
Spoke with wife. She notes fatigue and increased SOB. Patient is coming in today for left hip RT. Nurse spoke with dr ernst----cbc/cmp/type and screen today. Patient will wait in the lobby for results, as he feels like he did a few weeks ago when he needed a blood transfusion. Denies bleeding.    Wife thanked nurse.        Message routed to dr ernst

## 2019-09-20 NOTE — TELEPHONE ENCOUNTER
"----- Message from Archana Lockwood sent at 9/20/2019  8:21 AM CDT -----  Contact: Elicia Brown   Scheduling Request    Patient Status: established   Scheduling Appt : lab   Time/Date Preference: no preference   MyChart Active User?: Yes  Relationship to Patient?: wife   Contact Preference?: [phone]   Treating Provider: Parker HAHN MD   Do you feel you need to be seen today? Yes     Additional Notes:    - Current symptom- low hemoglobin   - Are you currently receiving treatment for your diagnosis: yes   - When was your last treatment ? No   - How long have you had the symptom? Unclear   "Thank you for all that you do for our patients'"      "

## 2019-09-20 NOTE — TELEPHONE ENCOUNTER
SW notified by Heart Failure RN Coordinator Yolanda Rose that pt interested in being set up by HH program that can bridge pt to hospice. SW able to assist.    SW contacted pt's wife Elicia Brown (ph#[phone]) in order to initiate HH referral. Wife reports that she is open to any agency that can bridge pt from HH to Hospice when appropriate. Wife amenable to using [facility] program. SW to follow up once [facility] confirms ability to see pt. SW remains available.    SW contacted intake at [facility] (ph#[phone] fax#[phone]) and notified agency of referral. SW faxed clinical and HH/AIM orders. SW notified by [facility] that pt to be admitted to their services and that someone will f/u with pt tomorrow, Saturday 9/21. SW expressed understanding and remains available.     SW contacted pt's wife and notified her of this information. Wife expressed appreciation and has [facility]'s contact number if she has any questions. SW remains available.

## 2019-09-20 NOTE — PROGRESS NOTES
[facility]   Heart Transplant Clinic  [address] [place] [address]   [phone] after hours        HOME  HEALTH / AIM ORDERS      Admit to Home Health/AIM    Diagnosis:   Patient Active Problem List   Diagnosis    Hypertension    Hyperlipidemia    Benign prostatic hyperplasia with urinary obstruction    Gout, chronic    Prediabetes    Intrahepatic cholangiocarcinoma    Cholangiocarcinoma    Arm numbness    Right shoulder pain    Secondary lung cancer    Secondary adenocarcinoma of retroperitoneum    Left hip pain    Other hydronephrosis    Bladder tumor    Hydronephrosis    Nuclear sclerosis, bilateral    Metastasis to bone    Chronic diastolic congestive heart failure    Hypertensive cardiovascular-renal disease, stage 1-4 or unspecified chronic kidney disease, with heart failure    Pulmonary hypertension    Hydronephrosis of right kidney    Degenerative joint disease (DJD) of hip    Stage 3 chronic kidney disease     Patient is homebound due to: End stage HF.   Diet: Low Sodium  Acitivities: As Tolerated    Nursing:   SN to complete comprehensive assessment including routine vital signs. Instruct on disease process and s/s of complications to report to MD. Review/verify medication list sent home with the patient at time of discharge  and instruct patient/caregiver as needed. Frequency may be adjusted depending on start of care date.    Notify MD if SBP > 160 or < 90; DBP > 90 or < 50; HR > 120 or < 50; Temp > 101; Weight gain >3lbs in 1 day or 5lbs in 1 week.    LABS:  SN to perform labs: Weekly bmp/mag.     Transition to Hospice when necessary.     CONSULTS:                                              to evaluate for community resources/long-range planning.     Aide to provide assistance with personal care, ADLs, and vital signs    Send initial Home Health orders to [facility] attending physician on call.  Send follow up  questions to [phone] or fax:                      Heart Failure:      [phone]

## 2019-09-21 ENCOUNTER — INFUSION (OUTPATIENT)
Dept: INFUSION THERAPY | Facility: HOSPITAL | Age: 69
End: 2019-09-21
Attending: INTERNAL MEDICINE
Payer: MEDICARE

## 2019-09-21 VITALS
HEART RATE: 62 BPM | DIASTOLIC BLOOD PRESSURE: 74 MMHG | SYSTOLIC BLOOD PRESSURE: 160 MMHG | TEMPERATURE: 98 F | RESPIRATION RATE: 18 BRPM

## 2019-09-21 DIAGNOSIS — C22.1 INTRAHEPATIC CHOLANGIOCARCINOMA: ICD-10-CM

## 2019-09-21 PROCEDURE — 86920 COMPATIBILITY TEST SPIN: CPT

## 2019-09-21 PROCEDURE — 36430 TRANSFUSION BLD/BLD COMPNT: CPT

## 2019-09-21 PROCEDURE — P9040 RBC LEUKOREDUCED IRRADIATED: HCPCS

## 2019-09-21 PROCEDURE — 63600175 PHARM REV CODE 636 W HCPCS: Performed by: INTERNAL MEDICINE

## 2019-09-21 RX ORDER — DIPHENHYDRAMINE HCL 25 MG
25 CAPSULE ORAL
Status: DISCONTINUED | OUTPATIENT
Start: 2019-09-21 | End: 2019-09-21 | Stop reason: HOSPADM

## 2019-09-21 RX ORDER — ACETAMINOPHEN 325 MG/1
650 TABLET ORAL
Status: DISCONTINUED | OUTPATIENT
Start: 2019-09-21 | End: 2019-09-21 | Stop reason: HOSPADM

## 2019-09-21 RX ORDER — HYDROCODONE BITARTRATE AND ACETAMINOPHEN 500; 5 MG/1; MG/1
TABLET ORAL ONCE
Status: COMPLETED | OUTPATIENT
Start: 2019-09-21 | End: 2019-09-21

## 2019-09-21 RX ADMIN — SODIUM CHLORIDE: 0.9 INJECTION, SOLUTION INTRAVENOUS at 08:09

## 2019-09-21 NOTE — PLAN OF CARE
Problem: Adult Inpatient Plan of Care  Goal: Plan of Care Review  Outcome: Ongoing (interventions implemented as appropriate)  Pt received one unit of PRBC ;tolerated well. VSS and NAD. Pt instructed to call MD with any concerns. Pt discharged home independently.

## 2019-09-21 NOTE — PLAN OF CARE
Problem: Anemia  Goal: Anemia Symptom Improvement    Intervention: Monitor and Manage Anemia  Pt here today for 1 unit PRBC. Reports SOB with exertion and increased fatigue. VSS. PIV started without issue. No questions at this time.

## 2019-09-22 ENCOUNTER — PATIENT MESSAGE (OUTPATIENT)
Dept: TRANSPLANT | Facility: CLINIC | Age: 69
End: 2019-09-22

## 2019-09-23 ENCOUNTER — LAB VISIT (OUTPATIENT)
Dept: LAB | Facility: HOSPITAL | Age: 69
End: 2019-09-23
Attending: INTERNAL MEDICINE
Payer: MEDICARE

## 2019-09-23 ENCOUNTER — PATIENT MESSAGE (OUTPATIENT)
Dept: HEMATOLOGY/ONCOLOGY | Facility: CLINIC | Age: 69
End: 2019-09-23

## 2019-09-23 DIAGNOSIS — N18.30 STAGE 3 CHRONIC KIDNEY DISEASE: ICD-10-CM

## 2019-09-23 DIAGNOSIS — I13.0 HYPERTENSIVE CARDIOVASCULAR-RENAL DISEASE, STAGE 1-4 OR UNSPECIFIED CHRONIC KIDNEY DISEASE, WITH HEART FAILURE: ICD-10-CM

## 2019-09-23 DIAGNOSIS — I50.32 CHRONIC DIASTOLIC CONGESTIVE HEART FAILURE: ICD-10-CM

## 2019-09-23 LAB
ANION GAP SERPL CALC-SCNC: 10 MMOL/L (ref 8–16)
BUN SERPL-MCNC: 39 MG/DL (ref 8–23)
CALCIUM SERPL-MCNC: 9.3 MG/DL (ref 8.7–10.5)
CHLORIDE SERPL-SCNC: 94 MMOL/L (ref 95–110)
CO2 SERPL-SCNC: 34 MMOL/L (ref 23–29)
CREAT SERPL-MCNC: 1.3 MG/DL (ref 0.5–1.4)
EST. GFR  (AFRICAN AMERICAN): >60 ML/MIN/1.73 M^2
EST. GFR  (NON AFRICAN AMERICAN): 55.7 ML/MIN/1.73 M^2
GLUCOSE SERPL-MCNC: 159 MG/DL (ref 70–110)
POTASSIUM SERPL-SCNC: 3.8 MMOL/L (ref 3.5–5.1)
SODIUM SERPL-SCNC: 138 MMOL/L (ref 136–145)

## 2019-09-23 PROCEDURE — G6002 PR STEREOSCOPIC XRAY GUIDE FOR RADIATION TX DELIV: ICD-10-PCS | Mod: 26,,, | Performed by: RADIOLOGY

## 2019-09-23 PROCEDURE — 77386 HC IMRT, COMPLEX: CPT | Performed by: RADIOLOGY

## 2019-09-23 PROCEDURE — 80048 BASIC METABOLIC PNL TOTAL CA: CPT

## 2019-09-23 PROCEDURE — G6002 STEREOSCOPIC X-RAY GUIDANCE: HCPCS | Mod: 26,,, | Performed by: RADIOLOGY

## 2019-09-23 PROCEDURE — 36415 COLL VENOUS BLD VENIPUNCTURE: CPT

## 2019-09-24 ENCOUNTER — DOCUMENTATION ONLY (OUTPATIENT)
Dept: RADIATION ONCOLOGY | Facility: CLINIC | Age: 69
End: 2019-09-24

## 2019-09-24 ENCOUNTER — TELEPHONE (OUTPATIENT)
Dept: HOME HEALTH SERVICES | Facility: HOSPITAL | Age: 69
End: 2019-09-24

## 2019-09-24 DIAGNOSIS — C22.1 INTRAHEPATIC CHOLANGIOCARCINOMA: Primary | ICD-10-CM

## 2019-09-24 PROCEDURE — 77336 RADIATION PHYSICS CONSULT: CPT | Performed by: RADIOLOGY

## 2019-09-24 PROCEDURE — 77386 HC IMRT, COMPLEX: CPT | Performed by: RADIOLOGY

## 2019-09-24 PROCEDURE — G6002 PR STEREOSCOPIC XRAY GUIDE FOR RADIATION TX DELIV: ICD-10-PCS | Mod: 26,,, | Performed by: RADIOLOGY

## 2019-09-24 PROCEDURE — G6002 STEREOSCOPIC X-RAY GUIDANCE: HCPCS | Mod: 26,,, | Performed by: RADIOLOGY

## 2019-09-27 ENCOUNTER — TELEPHONE (OUTPATIENT)
Dept: HEMATOLOGY/ONCOLOGY | Facility: CLINIC | Age: 69
End: 2019-09-27

## 2019-09-27 NOTE — TELEPHONE ENCOUNTER
----- Message from Ean Caro sent at 9/27/2019 11:43 AM CDT -----  Contact: [facility] nurse monie   Patient has reported an increase episodes of chocking while eating    [phone] Monie

## 2019-09-30 ENCOUNTER — PATIENT MESSAGE (OUTPATIENT)
Dept: HEMATOLOGY/ONCOLOGY | Facility: CLINIC | Age: 69
End: 2019-09-30

## 2019-09-30 DIAGNOSIS — R13.10 DYSPHAGIA, UNSPECIFIED TYPE: Primary | ICD-10-CM

## 2019-10-01 ENCOUNTER — DOCUMENTATION ONLY (OUTPATIENT)
Dept: HEMATOLOGY/ONCOLOGY | Facility: CLINIC | Age: 69
End: 2019-10-01

## 2019-10-01 DIAGNOSIS — Z51.5 PALLIATIVE CARE BY SPECIALIST: ICD-10-CM

## 2019-10-01 DIAGNOSIS — C22.1 CHOLANGIOCARCINOMA: Primary | ICD-10-CM

## 2019-10-01 DIAGNOSIS — G89.3 NEOPLASM RELATED PAIN: ICD-10-CM

## 2019-10-01 DIAGNOSIS — R53.0 NEOPLASTIC (MALIGNANT) RELATED FATIGUE: ICD-10-CM

## 2019-10-01 DIAGNOSIS — C22.1 INTRAHEPATIC CHOLANGIOCARCINOMA: ICD-10-CM

## 2019-10-01 RX ORDER — PREDNISONE 5 MG/1
10 TABLET ORAL DAILY
Qty: 60 TABLET | Refills: 5 | Status: SHIPPED | OUTPATIENT
Start: 2019-10-01 | End: 2019-10-03 | Stop reason: SDUPTHER

## 2019-10-01 RX ORDER — METHYLPHENIDATE HYDROCHLORIDE 5 MG/1
5 TABLET ORAL 2 TIMES DAILY WITH MEALS
Qty: 60 TABLET | Refills: 0 | Status: SHIPPED | OUTPATIENT
Start: 2019-10-01 | End: 2019-10-03 | Stop reason: SDUPTHER

## 2019-10-01 RX ORDER — PREDNISONE 5 MG/1
10 TABLET ORAL DAILY
Qty: 30 TABLET | Refills: 0 | OUTPATIENT
Start: 2019-10-01 | End: 2019-10-11

## 2019-10-01 NOTE — PROGRESS NOTES
Phone conference with patient and his family last night.  Dr. Brown, and two sons Dr. Wayne Brown and Romeo Brown and patient's wife.  Patient feels better on prednisone and is about to run out.  Patient says that pain is 2/10 when sitting still but unbearable with ambulation in lower back and hips. He has had XRT and selective nerve blocks without relief.  He is taking Methadone 10 mg every 12 hours and 50 mg  Oxycodone or 9-14 mg of dilaudid in a day.  Would increase methadone to 15 mg every 12 hours and he will see if the meds are easily broken or take 10 mg every 8 hours (I have no preference). In addition he can take oxycodone 10 mg every 2 hours or dilaudid 2 mg every 2 hours and perhaps consider taking 15 minutes prior to planned activity.  We also talked about fatigue. There is the most literature with methylphenidate rather than modafanil (will review again with access to UTD) Discussed use at low dose relative to increasing BP, HR. Limited side effects.  Will start with ritalin 5 mg at 8 am and 12 noon and proceed from there. Will send RX to [facility] pharmacy.

## 2019-10-03 ENCOUNTER — TELEPHONE (OUTPATIENT)
Dept: PHARMACY | Facility: CLINIC | Age: 69
End: 2019-10-03

## 2019-10-03 DIAGNOSIS — G89.3 NEOPLASM RELATED PAIN: ICD-10-CM

## 2019-10-03 DIAGNOSIS — Z51.5 PALLIATIVE CARE BY SPECIALIST: ICD-10-CM

## 2019-10-03 DIAGNOSIS — C22.1 INTRAHEPATIC CHOLANGIOCARCINOMA: ICD-10-CM

## 2019-10-03 DIAGNOSIS — C22.1 CHOLANGIOCARCINOMA: ICD-10-CM

## 2019-10-03 DIAGNOSIS — R53.0 NEOPLASTIC (MALIGNANT) RELATED FATIGUE: ICD-10-CM

## 2019-10-03 RX ORDER — OXYCODONE HYDROCHLORIDE 10 MG/1
10 TABLET ORAL EVERY 4 HOURS PRN
Qty: 120 TABLET | Refills: 0 | Status: SHIPPED | OUTPATIENT
Start: 2019-10-03

## 2019-10-03 RX ORDER — PREDNISONE 5 MG/1
10 TABLET ORAL DAILY
Qty: 60 TABLET | Refills: 3 | Status: SHIPPED | OUTPATIENT
Start: 2019-10-03 | End: 2019-11-02

## 2019-10-03 RX ORDER — HYDROMORPHONE HYDROCHLORIDE 2 MG/1
2 TABLET ORAL EVERY 4 HOURS PRN
Qty: 90 TABLET | Refills: 0 | Status: SHIPPED | OUTPATIENT
Start: 2019-10-03 | End: 2020-10-02

## 2019-10-03 RX ORDER — METHYLPHENIDATE HYDROCHLORIDE 5 MG/1
5 TABLET ORAL 2 TIMES DAILY WITH MEALS
Qty: 60 TABLET | Refills: 0 | Status: SHIPPED | OUTPATIENT
Start: 2019-10-03 | End: 2019-10-27 | Stop reason: SDUPTHER

## 2019-10-03 RX ORDER — METHADONE HYDROCHLORIDE 10 MG/1
20 TABLET ORAL EVERY 12 HOURS
Qty: 120 TABLET | Refills: 0 | Status: SHIPPED | OUTPATIENT
Start: 2019-10-03 | End: 2019-10-27 | Stop reason: SDUPTHER

## 2019-10-03 NOTE — PROGRESS NOTES
Despite sending RX for ritalin and prednisone electronically to [facility] on 10/1, they have no record. Both RX sent to [facility] at patient's request.

## 2019-10-03 NOTE — PROGRESS NOTES
Phone conference with patient and his wife.  He is taking 7-9 extra oxycodone or 5 hydromorphone 2 mg per day. Feels better with increase in methadone to 15 mg twice daily.  Did not get notified of RX for ritalin at [facility]. Has not had any to take. Has been taking prednisone.  Discussed pain meds. He seems brighter and more active.  Would increase the methadone to 20 mg twice daily. RX sent to [facility] for methadone/oxycodone/hydromorphone.

## 2019-10-08 ENCOUNTER — DOCUMENTATION ONLY (OUTPATIENT)
Dept: HEMATOLOGY/ONCOLOGY | Facility: CLINIC | Age: 69
End: 2019-10-08

## 2019-10-08 NOTE — PROGRESS NOTES
Spoke with patient by phone. He is at [facility] in [place] with daughter from [place], celebrating her birthday. He says he feels better with the addition of the Ritalin. He also thinks his pain is better but still taking 70 mg extra of oxycodone. Discussed pain meds and the type of pain he is experiencing. He will increase methadone to 25 mg every 12 hours. He will not come to appointment on Friday and we will talk by phone as needed or on Monday.

## 2019-10-09 ENCOUNTER — TELEPHONE (OUTPATIENT)
Dept: TRANSPLANT | Facility: CLINIC | Age: 69
End: 2019-10-09
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
## 2019-10-11 ENCOUNTER — TELEPHONE (OUTPATIENT)
Dept: TRANSPLANT | Facility: CLINIC | Age: 69
End: 2019-10-11

## 2019-10-11 NOTE — TELEPHONE ENCOUNTER
Weekly labs reviewed. Labs are within pts usual range.  Informed pts wife of results. Released lab results in [facility] per wifes request. Wife verbalized understanding of the above.

## 2019-10-11 NOTE — TELEPHONE ENCOUNTER
10/10 Called [facility]  to see if pt was getting has labs drawn today I spoke with Aggie and she said  that pt is getting his labs drawn tomorrow. I called back and ask to speak to Azalea she informed me that pt is out of town and will  be back sometime tonight. Azalea said she didn't know that the pt labs had to be drawn today. I reminded her about the conversation that she had with Ifeoma the day before, and she said that she misunderstood the conversation and pt will have his labs drawn on tomorrow.

## 2019-10-14 ENCOUNTER — TELEPHONE (OUTPATIENT)
Dept: SPEECH THERAPY | Facility: HOSPITAL | Age: 69
End: 2019-10-14

## 2019-10-16 ENCOUNTER — TELEPHONE (OUTPATIENT)
Dept: TRANSPLANT | Facility: CLINIC | Age: 69
End: 2019-10-16

## 2019-10-16 ENCOUNTER — LAB VISIT (OUTPATIENT)
Dept: LAB | Facility: HOSPITAL | Age: 69
End: 2019-10-16
Attending: INTERNAL MEDICINE
Payer: MEDICARE

## 2019-10-16 DIAGNOSIS — I13.0 HYPERTENSIVE HEART AND RENAL DISEASE WITH CONGESTIVE HEART FAILURE: Primary | ICD-10-CM

## 2019-10-16 LAB
ANION GAP SERPL CALC-SCNC: 11 MMOL/L (ref 8–16)
BUN SERPL-MCNC: 28 MG/DL (ref 8–23)
CALCIUM SERPL-MCNC: 8.8 MG/DL (ref 8.7–10.5)
CHLORIDE SERPL-SCNC: 90 MMOL/L (ref 95–110)
CO2 SERPL-SCNC: 32 MMOL/L (ref 23–29)
CREAT SERPL-MCNC: 1.5 MG/DL (ref 0.5–1.4)
EST. GFR  (AFRICAN AMERICAN): 54.1 ML/MIN/1.73 M^2
EST. GFR  (NON AFRICAN AMERICAN): 46.8 ML/MIN/1.73 M^2
GLUCOSE SERPL-MCNC: 157 MG/DL (ref 70–110)
MAGNESIUM SERPL-MCNC: 2.1 MG/DL (ref 1.6–2.6)
POTASSIUM SERPL-SCNC: 5.5 MMOL/L (ref 3.5–5.1)
SODIUM SERPL-SCNC: 133 MMOL/L (ref 136–145)

## 2019-10-16 PROCEDURE — 80048 BASIC METABOLIC PNL TOTAL CA: CPT

## 2019-10-16 PROCEDURE — 83735 ASSAY OF MAGNESIUM: CPT

## 2019-10-16 NOTE — TELEPHONE ENCOUNTER
3:50 pm  F/U on todays nurse lab phone review:  Just received BMP results from [facility] collection.  K+:  5.5 ( no visible hemolysis)  Cr:  1.5   M.1  Called pt to review labs, K+ and diet: FERNANDO, LVM with my name, office with, day,date , time and K+ level high on blood work and need to discuss this with him.  Per chart he takes 10 meq bid-left instructions for pt not to take tonight and tomorrow am dose and to please return my call to discuss further.  Left my contact phone number.   3:55 pm:  Placed call to Dr. Grove to review. NA on spectralink or lab. LM for Dr. Gibson to call me.    4:45 pm  Dr. Gibson returned call.  Informed him I have not yet spoken with pt to review diet, how he is taking K+ and bumex, etc.Reviewed K+ and BUmex med as per epic. And labs today as well as bun/cr and K+ from  10/11/19 collections.  TORB: Dr. Andres/Jorje, RN:  Pt to stop taking oral  Potassium for now and until labs repeated and reviewed. Repeat BMP as scheduled for Wednesday 10/23/19-no need to repeat labs prior to that.   5:45 pm: Attempted to reach pt again. FERNANDO, LVM on pts phone with instructions to stop taking Potassium for now and until lab work repeated by  next Wednesday.  Also asked that pt return my call tomorrow in that I have questions necessary to discuss with him.  Again left my name and office contact phone number.      10/17/19   1100 am:  Have not heard back form pt.  Called again at this time. NA I LVM with same content as yesterday.    Called phone number for pts son: Dr. Brown.  Informed him of whom I am, MD office with and have not been able to reach pt ( his father) to review lab results, potassium, diet, meds,  etc.  He said his father and mother often do not answer their phone.  Told him of K+ yesterday: 5.5,  Cr stable @ 1.5   And that Dr. Gibson would like for him to stop taking potassium for now and repeat labs as already scheduled weekly by  -next Wednesday 10/23/19.  He assured me  he will take care of this with his father and nothing futther this office needs to do  I will add to next Wed 10/23 nurse review that Potassium is on hold at this time.

## 2019-10-17 ENCOUNTER — DOCUMENTATION ONLY (OUTPATIENT)
Dept: HEMATOLOGY/ONCOLOGY | Facility: CLINIC | Age: 69
End: 2019-10-17

## 2019-10-18 ENCOUNTER — TELEPHONE (OUTPATIENT)
Dept: SPEECH THERAPY | Facility: HOSPITAL | Age: 69
End: 2019-10-18

## 2019-10-21 ENCOUNTER — TELEPHONE (OUTPATIENT)
Dept: SPEECH THERAPY | Facility: HOSPITAL | Age: 69
End: 2019-10-21

## 2019-10-21 RX ORDER — ALLOPURINOL 100 MG/1
100 TABLET ORAL DAILY
Qty: 90 TABLET | Refills: 3 | Status: SHIPPED | OUTPATIENT
Start: 2019-10-21

## 2019-10-21 NOTE — PROGRESS NOTES
"Phone conference with patient 7 pm.  He stated his pain is "about 1-2 using 25 mg methadone twice a day seems to control well. I have not taken short lasting pain meds for last two days now."  That is a significant improvement. He did not need any refills until later in the month. I would like him to let me know if his pain is not controlled. His disease is progressing and I would rather he spend time with family than come in to office-unless he needs something. He was agreeable.  "

## 2019-10-24 ENCOUNTER — TELEPHONE (OUTPATIENT)
Dept: TRANSPLANT | Facility: CLINIC | Age: 69
End: 2019-10-24

## 2019-10-24 ENCOUNTER — TELEPHONE (OUTPATIENT)
Dept: SPEECH THERAPY | Facility: HOSPITAL | Age: 69
End: 2019-10-24

## 2019-10-24 NOTE — NURSING
Labs from 10/23/19 reviewed with Dr Gibson K 4.9, bun/cr 29/1.3. Per Dr Javier Gibson I instructed pt to disconnot

## 2019-10-27 ENCOUNTER — DOCUMENTATION ONLY (OUTPATIENT)
Dept: HEMATOLOGY/ONCOLOGY | Facility: CLINIC | Age: 69
End: 2019-10-27

## 2019-10-27 DIAGNOSIS — Z51.5 PALLIATIVE CARE BY SPECIALIST: ICD-10-CM

## 2019-10-27 DIAGNOSIS — R53.0 NEOPLASTIC (MALIGNANT) RELATED FATIGUE: ICD-10-CM

## 2019-10-27 DIAGNOSIS — C22.1 INTRAHEPATIC CHOLANGIOCARCINOMA: ICD-10-CM

## 2019-10-27 DIAGNOSIS — C22.1 CHOLANGIOCARCINOMA: ICD-10-CM

## 2019-10-27 RX ORDER — METHYLPHENIDATE HYDROCHLORIDE 5 MG/1
5 TABLET ORAL 2 TIMES DAILY WITH MEALS
Qty: 60 TABLET | Refills: 0 | Status: SHIPPED | OUTPATIENT
Start: 2019-10-27 | End: 2019-11-30

## 2019-10-27 RX ORDER — METHADONE HYDROCHLORIDE 5 MG/1
5 TABLET ORAL 2 TIMES DAILY
Qty: 60 TABLET | Refills: 0 | Status: SHIPPED | OUTPATIENT
Start: 2019-10-27 | End: 2019-11-28

## 2019-10-27 RX ORDER — METHADONE HYDROCHLORIDE 10 MG/1
20 TABLET ORAL EVERY 12 HOURS
Qty: 120 TABLET | Refills: 0 | Status: SHIPPED | OUTPATIENT
Start: 2019-10-27 | End: 2019-11-30

## 2019-10-27 NOTE — PROGRESS NOTES
Spoke with patient's son Wayne on Friday afternoon (10/25/2019) He said his dad was doing really well. His fatigue is significantly improved with the ritalin and he rarely takes any breakthrough pain medications now that his dose of methadone is 25 mg twice daily.   Will continue to follow. They can call me with any questions or concerns. Goal is to spend his time with family and friends.

## 2019-10-28 ENCOUNTER — TELEPHONE (OUTPATIENT)
Dept: TRANSPLANT | Facility: CLINIC | Age: 69
End: 2019-10-28

## 2019-10-28 NOTE — TELEPHONE ENCOUNTER
----- Message from [employer] sent at 10/28/2019 12:23 PM CDT -----  Good afternoon,            Pt was rescheduled due to book out with Dr. Gibson. The wife is wondering if he should continue taking a medication until 11/25. Please contact pt's wife. Thank you

## 2019-10-28 NOTE — TELEPHONE ENCOUNTER
Addendum to note on 10/24/19 7405:   Per Dr Javier Gibson I instructed pt to discontinue potassium. Med card update. Pt verbalized understanding.

## 2019-10-30 ENCOUNTER — LAB VISIT (OUTPATIENT)
Dept: LAB | Facility: HOSPITAL | Age: 69
End: 2019-10-30
Attending: INTERNAL MEDICINE
Payer: MEDICARE

## 2019-10-30 DIAGNOSIS — I13.0 HYPERTENSIVE HEART AND RENAL DISEASE WITH CONGESTIVE HEART FAILURE: Primary | ICD-10-CM

## 2019-10-30 LAB
ANION GAP SERPL CALC-SCNC: 11 MMOL/L (ref 8–16)
BUN SERPL-MCNC: 33 MG/DL (ref 8–23)
CALCIUM SERPL-MCNC: 9.7 MG/DL (ref 8.7–10.5)
CHLORIDE SERPL-SCNC: 97 MMOL/L (ref 95–110)
CO2 SERPL-SCNC: 29 MMOL/L (ref 23–29)
CREAT SERPL-MCNC: 1.4 MG/DL (ref 0.5–1.4)
EST. GFR  (AFRICAN AMERICAN): 58.4 ML/MIN/1.73 M^2
EST. GFR  (NON AFRICAN AMERICAN): 50.5 ML/MIN/1.73 M^2
GLUCOSE SERPL-MCNC: 148 MG/DL (ref 70–110)
MAGNESIUM SERPL-MCNC: 2.2 MG/DL (ref 1.6–2.6)
POTASSIUM SERPL-SCNC: 4.5 MMOL/L (ref 3.5–5.1)
SODIUM SERPL-SCNC: 137 MMOL/L (ref 136–145)

## 2019-10-30 PROCEDURE — 80048 BASIC METABOLIC PNL TOTAL CA: CPT

## 2019-10-30 PROCEDURE — 83735 ASSAY OF MAGNESIUM: CPT

## 2019-11-01 ENCOUNTER — TELEPHONE (OUTPATIENT)
Dept: HOME HEALTH SERVICES | Facility: HOSPITAL | Age: 69
End: 2019-11-01

## 2019-11-07 ENCOUNTER — TELEPHONE (OUTPATIENT)
Dept: TRANSPLANT | Facility: CLINIC | Age: 69
End: 2019-11-07

## 2019-11-07 NOTE — TELEPHONE ENCOUNTER
Spoke to Mary RN with [facility]  reporting that pt refused labs yesterday, because he is transitioning to hospice. Mary says HH last visit was yesterday, and pt will be on hospice as of next week. LVM for pt to please call me to confirm.

## 2019-11-08 ENCOUNTER — DOCUMENTATION ONLY (OUTPATIENT)
Dept: HEMATOLOGY/ONCOLOGY | Facility: CLINIC | Age: 69
End: 2019-11-08

## 2019-11-08 DIAGNOSIS — I50.32 CHRONIC DIASTOLIC CONGESTIVE HEART FAILURE: ICD-10-CM

## 2019-11-08 DIAGNOSIS — Z51.5 PALLIATIVE CARE BY SPECIALIST: ICD-10-CM

## 2019-11-08 DIAGNOSIS — C22.1 INTRAHEPATIC CHOLANGIOCARCINOMA: Primary | ICD-10-CM

## 2019-11-08 NOTE — PROGRESS NOTES
Patient would like to be enrolled with [facility].  Family has interviewed several hospice agencies.  Will place order and notify [facility]

## 2019-11-08 NOTE — PROGRESS NOTES
Spoke to the patient's wife, Elicia after receiving a consult for hospice services from Dr Fuentes: She confirmed that they would like to have a referral to [facility] - referral made to Pat at [facility] [phone] and order and documentation faxed to her at . The patient's wife has contact information for me.

## 2020-10-05 ENCOUNTER — PATIENT MESSAGE (OUTPATIENT)
Dept: ADMINISTRATIVE | Facility: HOSPITAL | Age: 70
End: 2020-10-05

## 2021-01-01 NOTE — TELEPHONE ENCOUNTER
LVM for pt requesting clarification about pts medication question. Left contact number requesting call back tomorrow.    40.2

## 2021-01-04 ENCOUNTER — PATIENT MESSAGE (OUTPATIENT)
Dept: ADMINISTRATIVE | Facility: HOSPITAL | Age: 71
End: 2021-01-04

## 2021-04-05 ENCOUNTER — PATIENT MESSAGE (OUTPATIENT)
Dept: ADMINISTRATIVE | Facility: HOSPITAL | Age: 71
End: 2021-04-05

## 2021-04-26 ENCOUNTER — PATIENT MESSAGE (OUTPATIENT)
Dept: RESEARCH | Facility: HOSPITAL | Age: 71
End: 2021-04-26

## 2021-07-06 ENCOUNTER — PATIENT MESSAGE (OUTPATIENT)
Dept: ADMINISTRATIVE | Facility: HOSPITAL | Age: 71
End: 2021-07-06

## 2021-10-04 ENCOUNTER — PATIENT MESSAGE (OUTPATIENT)
Dept: ADMINISTRATIVE | Facility: HOSPITAL | Age: 71
End: 2021-10-04

## 2022-07-09 NOTE — PLAN OF CARE
[facility]  PATIENT EVALUATION    Date: 01/31/2018  Evaluation Date: 1/31/2018    Visit #: 1    Start Time:  1105  Stop Time:  1200      History     HPI: Patient reports left hip pain starting 3 weeks prior. He states he normally walks in the morning and states he recently started doing some squats. He continued to do this exercise and then started noticing pain in hip. He has taken some medication with no relief, but states ibuprofen which is helping. He states he gets some relief with sleeping on his left side. Patient was recommended to add pillow between knees.   Onset Date: 3 weeks prior  Date of Surgery:H/O open heart sx and tumor resection  Primary Diagnosis:   1. 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    Pain:  Location: L hip   Description: Throbbing and Sharp  Activities Which Increase Pain: Sitting and squatting  Activities Which Decrease Pain: ibprofen  Pain Scale: 2/10 at best 3/10 now  7/10 at worst    Objective   Observation: patient appears stated age  Palpation: TTP L GT  Sensation: WNL    Posture:  XRR/1713   Posture  ASIS height    Iliac crest height     PSIS height    Cervical Spine    Thoracic Spine    Lumbar Spine    Greater Trochanter    Ischial Tuberosity    Knee    Foot position      Range of Motion:      Hip: Left Right   Flexion 115 110   Extension NT NT   Abduction 30 20   Adduction 30 25   Internal Rotation 29 27   External Rotation 30 30                         Strength:      Hip: Left Right   Abduction 3+/5 3/5   Adduction NT 5/5   Flexion 5/5 5/5   Extension 4/5 4/5   IR 4+/5 5/5   ER 5/5 5/5                       Knee:        Left Right   Extension  5/5 5/5   Flexion 5/5 5/5                       Functional Tests   Left Right   Bridge Test + +         Flexibility:        Left Right   SLR + +   Hamstring 90°/90°     Prone Rectus Femoris     Gastrocnemius     Soleus     Rubas     Piriformis + +   Psoas                           Gait: Without AD  Analysis: Patient did not show any obvious gait abnormalities    Special Tests:      - Scouring          +  Leg Length Discrepancy L ______  R __>___      SI Tests:  + SACHIN    + R anterior/L posterior SI dysfunction            Treatment:   SLR x15  Clamshells otb x15 B  Lateral walking x1 lap  Bridges x15  Seated HS stretch x30 sec B  Piriformis stretch x30 sec B  LTR x20        History  Co-morbidities and personal factors that may impact the plan of care Moderate    Evolving Clinical Presentation   Co-morbidities:       Personal Factors:     Body regions    Body systems    Activity Limitations    Participation Restrictons     1- 2  Personal factors/ combordities:         Address  3 + elements:     ROM impaired  MMT impaired  Gait impaired  Decreased FOTO score             PT reviewed FOTO scores for Eric Brown on 1/31/2018    FOTO score: 56    Functional Limitations Reports - G Codes  Category: mobility  Tool: FOTO      Current ():  CK  Goal (): CJ  Discharge ():  NA            Assessment   This is a 67 y.o. male referred to outpatient  physical therapy and presents with a medical diagnosis of L hip pain with weakness and demonstrates limitations as described in the problem list. Pt demonstrates fair rehab potential. Pt will benefit from physcial therapy services in order to maximize pain free and/or functional use of left hip. The following goals were discussed with the patient and patient is in agreement with them as to be addressed in the treatment plan. Pt was given a HEP consisting of treatment this visit. Pt verbally understood the instructions as they were given and demonstrated proper form and technique during therapy. Pt was advised to perform these exercises free of pain, and to stop performing them if pain occurs.       Initial Assessment (Pertinent finding, problem list and factors affecting outcome):   Medical necessity is demonstrated by the following problem list:   - Pain limits function of effected part for all activities  - Unable to participate in daily activities   - Fall risk - impaired balance     Rehab Potiential: fair    Short Term Goals (2-3 Weeks):   - Pt will increase strength to 4/5 B hip abd  - Decrease Pain to 3/10 as worst  - Pt to self correct posture with minimal cues  - Pt independent with HEP with progressions.     Long Term Goals (6-8 Weeks):   - Pt will increase strength to 5/5 BLE  - Decrease Pain to 1/10 as worst  - Pt to return to 70% of PLOF    Plan   Pt will be seen 1x a week for 2-3 more visits once chemo treatment plan is established.   AROM/PROM exercise  Manual soft tissue and/or joint mobilization  Therapeutic Exercise   Individualized Home Exercise Program  Modalities: cryotherapy  Pt education:on HEP    Therapist: Carissa Abarca, PT, DPT    I CERTIFY THE NEED FOR THESE SERVICES FURNISHED UNDER THIS PLAN OF TREATMENT AND WHILE UNDER MY CARE    Physician's comments:  ________________________________________________________________________________________________________________________________________________    Physician's Name: ___________________________________    Please sign and return plan of care. Thank you for this referral.

## 2023-06-12 NOTE — PROGRESS NOTES
HPI     s/p CE with IOL OD ([employer]) (6/3/19).     PGB TID OD    Pt here for 1 week post op OD. Pt states doing well. Pt denies eye pain   OD.     Last edited by Hilary Judge MA on 6/12/2019  2:14 PM.   (History)            Assessment /Plan     For exam results, see Encounter Report.    Nuclear sclerosis, bilateral    Post-operative state    Slit lamp exam:  L/L: nl  K: clear, wound sealed  AC: trace cell  Iris/Lens: IOL centered and stable    POW1 s/p phaco: Surgery healing well with no signs of infection or abnormal inflammation.    Patient wishes to proceed with surgery in the second eye. Risks, benefits, alternatives reviewed. IOL selection reviewed.     Left eye  IOL: [id number] 12.0 at 85 (ERM, discussed limitations and retina eval)    The patient expresses a desire to reduce spectacle dependence. I reviewed various IOL and LASER refractive surgical options and we will attempt to minimize spectacle dependence by managing astigmatism and optimizing IOL selection. Femtosecond LASER assisted cataract surgery (FLACS) technology was explained to the patient with educational videos and discussion.  The patient voices understanding and wishes to implement this technology during the cataract procedure.  I explained the increased precision of the LASER versus manual techniques, especially as it relates to astigmatism reduction with arcuate incisions.  I emphasized that although our goal is to reduce the need for refractive correction after surgery, there may still be a need for spectacle correction to achieve optimal visual acuity, and that a reasonable range of functional vision should be the expectation.  No guarantees are made about post operative refraction or visual acuity, as the eye may heal in unpredictable ways, and the standard risks, benefits, and alternatives to cataract surgery were explained.  The patient understands that the refractive portions of this cataract procedure are not covered by  insurance, and that there is an out of pocket expense of $2250 per eye. I also explained that even though our pre-operative plan is to utilize advanced refractive technologies during surgery, that I may decide to eliminate part or all of this plan if surgical challenges or complications arise, or I feel that it is not in the patient's best interest. Consent forms and an ABN form were given to the patient to review.      Catalys Parameters:    Left Eye:   SERGO:  12mm   ?Need to marc patient sitting up?: Yes  Capsulotomy: Scanned Capsule  rdGrdrrdarddrderd:rd rd3rd Arcuate: Toric Marc: at  Axis: 84   Incisions:  OFF                              19-Nov-2017 18:28 No.

## (undated) DEVICE — GOWN SMARTGOWN LVL4 X-LONG XL

## (undated) DEVICE — WIRE GUIDE 0.038OLD

## (undated) DEVICE — TRAY CYSTO BASIN

## (undated) DEVICE — SOL IRR WATER STRL 3000 ML

## (undated) DEVICE — GLOVE BIOGEL 7.5

## (undated) DEVICE — GLOVE BIOGEL SKINSENSE PI 6.5

## (undated) DEVICE — CATH POLLACK OPEN-END FLEXI-TI

## (undated) DEVICE — GLOVE SURG BIOGEL LATEX SZ 7.5

## (undated) DEVICE — SET IRR URLGY 2LINE UNIV SPIKE

## (undated) DEVICE — SYR SLIP TIP 1CC

## (undated) DEVICE — SOL BETADINE 5%

## (undated) DEVICE — GLASSES EYE PROTECTIVE

## (undated) DEVICE — GLOVE BIOGEL SKINSENSE PI 7.5

## (undated) DEVICE — Device

## (undated) DEVICE — PACK CYSTO

## (undated) DEVICE — GUIDE WIRE MOTION .035 X 150CM

## (undated) DEVICE — SYR 10CC LUER LOCK

## (undated) DEVICE — CASSETTE INFINITI

## (undated) DEVICE — CATH 5FR OPEN END URETERAL

## (undated) DEVICE — LINER GLOVE POWDERFREE 8

## (undated) DEVICE — SET CYSTO IRRIGATION UNIV SPIK